# Patient Record
Sex: MALE | Race: WHITE | Employment: OTHER | URBAN - METROPOLITAN AREA
[De-identification: names, ages, dates, MRNs, and addresses within clinical notes are randomized per-mention and may not be internally consistent; named-entity substitution may affect disease eponyms.]

---

## 2017-08-06 RX ORDER — PANTOPRAZOLE SODIUM 20 MG/1
20 TABLET, DELAYED RELEASE ORAL DAILY PRN
Qty: 90 TABLET | Refills: 3 | Status: SHIPPED | OUTPATIENT
Start: 2017-08-06 | End: 2018-09-10 | Stop reason: SDUPTHER

## 2018-04-23 ENCOUNTER — OFFICE VISIT (OUTPATIENT)
Dept: FAMILY MEDICINE CLINIC | Age: 63
End: 2018-04-23
Payer: MEDICARE

## 2018-04-23 VITALS
BODY MASS INDEX: 31.81 KG/M2 | WEIGHT: 255.8 LBS | OXYGEN SATURATION: 95 % | HEART RATE: 88 BPM | TEMPERATURE: 98.5 F | HEIGHT: 75 IN | SYSTOLIC BLOOD PRESSURE: 140 MMHG | DIASTOLIC BLOOD PRESSURE: 92 MMHG

## 2018-04-23 DIAGNOSIS — Z00.00 ROUTINE GENERAL MEDICAL EXAMINATION AT A HEALTH CARE FACILITY: Primary | ICD-10-CM

## 2018-04-23 DIAGNOSIS — Z00.00 ROUTINE GENERAL MEDICAL EXAMINATION AT A HEALTH CARE FACILITY: ICD-10-CM

## 2018-04-23 DIAGNOSIS — Z12.5 SCREENING PSA (PROSTATE SPECIFIC ANTIGEN): ICD-10-CM

## 2018-04-23 DIAGNOSIS — N39.3 STRESS INCONTINENCE OF URINE: ICD-10-CM

## 2018-04-23 DIAGNOSIS — N40.1 BENIGN PROSTATIC HYPERPLASIA WITH LOWER URINARY TRACT SYMPTOMS, SYMPTOM DETAILS UNSPECIFIED: ICD-10-CM

## 2018-04-23 DIAGNOSIS — Z13.220 NEED FOR LIPID SCREENING: ICD-10-CM

## 2018-04-23 DIAGNOSIS — Z23 NEED FOR PROPHYLACTIC VACCINATION AGAINST STREPTOCOCCUS PNEUMONIAE (PNEUMOCOCCUS): ICD-10-CM

## 2018-04-23 LAB
ALBUMIN SERPL-MCNC: 4.2 G/DL (ref 3.9–4.9)
ALP BLD-CCNC: 66 U/L (ref 35–104)
ALT SERPL-CCNC: 15 U/L (ref 0–41)
ANION GAP SERPL CALCULATED.3IONS-SCNC: 13 MEQ/L (ref 7–13)
AST SERPL-CCNC: 15 U/L (ref 0–40)
BACTERIA: ABNORMAL /HPF
BASOPHILS ABSOLUTE: 0.1 K/UL (ref 0–0.2)
BASOPHILS RELATIVE PERCENT: 0.7 %
BILIRUB SERPL-MCNC: 0.7 MG/DL (ref 0–1.2)
BILIRUBIN URINE: NEGATIVE
BLOOD, URINE: NEGATIVE
BUN BLDV-MCNC: 21 MG/DL (ref 8–23)
CALCIUM SERPL-MCNC: 9 MG/DL (ref 8.6–10.2)
CHLORIDE BLD-SCNC: 107 MEQ/L (ref 98–107)
CHOLESTEROL, TOTAL: 206 MG/DL (ref 0–199)
CLARITY: CLEAR
CO2: 27 MEQ/L (ref 22–29)
COLOR: YELLOW
CREAT SERPL-MCNC: 1.06 MG/DL (ref 0.7–1.2)
EOSINOPHILS ABSOLUTE: 0.1 K/UL (ref 0–0.7)
EOSINOPHILS RELATIVE PERCENT: 1.7 %
GFR AFRICAN AMERICAN: >60
GFR NON-AFRICAN AMERICAN: >60
GLOBULIN: 2.2 G/DL (ref 2.3–3.5)
GLUCOSE BLD-MCNC: 99 MG/DL (ref 74–109)
GLUCOSE URINE: NEGATIVE MG/DL
HCT VFR BLD CALC: 49.6 % (ref 42–52)
HDLC SERPL-MCNC: 45 MG/DL (ref 40–59)
HEMOGLOBIN: 17.2 G/DL (ref 14–18)
KETONES, URINE: NEGATIVE MG/DL
LDL CHOLESTEROL CALCULATED: 128 MG/DL (ref 0–129)
LEUKOCYTE ESTERASE, URINE: ABNORMAL
LYMPHOCYTES ABSOLUTE: 1.5 K/UL (ref 1–4.8)
LYMPHOCYTES RELATIVE PERCENT: 19.7 %
MCH RBC QN AUTO: 32.4 PG (ref 27–31.3)
MCHC RBC AUTO-ENTMCNC: 34.7 % (ref 33–37)
MCV RBC AUTO: 93.5 FL (ref 80–100)
MONOCYTES ABSOLUTE: 0.6 K/UL (ref 0.2–0.8)
MONOCYTES RELATIVE PERCENT: 7.4 %
MUCUS: PRESENT
NEUTROPHILS ABSOLUTE: 5.5 K/UL (ref 1.4–6.5)
NEUTROPHILS RELATIVE PERCENT: 70.5 %
NITRITE, URINE: NEGATIVE
PDW BLD-RTO: 12.9 % (ref 11.5–14.5)
PH UA: 5 (ref 5–9)
PLATELET # BLD: 188 K/UL (ref 130–400)
POTASSIUM SERPL-SCNC: 5.3 MEQ/L (ref 3.5–5.1)
PROSTATE SPECIFIC ANTIGEN: 4.13 NG/ML (ref 0–5.4)
PROTEIN UA: NEGATIVE MG/DL
RBC # BLD: 5.3 M/UL (ref 4.7–6.1)
RBC UA: ABNORMAL /HPF (ref 0–2)
SODIUM BLD-SCNC: 147 MEQ/L (ref 132–144)
SPECIFIC GRAVITY UA: 1.03 (ref 1–1.03)
TOTAL PROTEIN: 6.4 G/DL (ref 6.4–8.1)
TRIGL SERPL-MCNC: 166 MG/DL (ref 0–200)
UROBILINOGEN, URINE: 0.2 E.U./DL
WBC # BLD: 7.8 K/UL (ref 4.8–10.8)
WBC UA: ABNORMAL /HPF (ref 0–5)

## 2018-04-23 PROCEDURE — 90670 PCV13 VACCINE IM: CPT | Performed by: FAMILY MEDICINE

## 2018-04-23 PROCEDURE — G0438 PPPS, INITIAL VISIT: HCPCS | Performed by: FAMILY MEDICINE

## 2018-04-23 PROCEDURE — G0009 ADMIN PNEUMOCOCCAL VACCINE: HCPCS | Performed by: FAMILY MEDICINE

## 2018-04-23 PROCEDURE — 4040F PNEUMOC VAC/ADMIN/RCVD: CPT | Performed by: FAMILY MEDICINE

## 2018-04-23 ASSESSMENT — LIFESTYLE VARIABLES
HOW OFTEN DURING THE LAST YEAR HAVE YOU FOUND THAT YOU WERE NOT ABLE TO STOP DRINKING ONCE YOU HAD STARTED: 0
HOW OFTEN DURING THE LAST YEAR HAVE YOU NEEDED AN ALCOHOLIC DRINK FIRST THING IN THE MORNING TO GET YOURSELF GOING AFTER A NIGHT OF HEAVY DRINKING: 0
AUDIT-C TOTAL SCORE: 1
HOW OFTEN DURING THE LAST YEAR HAVE YOU BEEN UNABLE TO REMEMBER WHAT HAPPENED THE NIGHT BEFORE BECAUSE YOU HAD BEEN DRINKING: 0
HOW MANY STANDARD DRINKS CONTAINING ALCOHOL DO YOU HAVE ON A TYPICAL DAY: 0
HAVE YOU OR SOMEONE ELSE BEEN INJURED AS A RESULT OF YOUR DRINKING: 0
HAS A RELATIVE, FRIEND, DOCTOR, OR ANOTHER HEALTH PROFESSIONAL EXPRESSED CONCERN ABOUT YOUR DRINKING OR SUGGESTED YOU CUT DOWN: 0
HOW OFTEN DURING THE LAST YEAR HAVE YOU FAILED TO DO WHAT WAS NORMALLY EXPECTED FROM YOU BECAUSE OF DRINKING: 0
HOW OFTEN DO YOU HAVE A DRINK CONTAINING ALCOHOL: 1
HOW OFTEN DURING THE LAST YEAR HAVE YOU HAD A FEELING OF GUILT OR REMORSE AFTER DRINKING: 0
HOW OFTEN DO YOU HAVE SIX OR MORE DRINKS ON ONE OCCASION: 0
AUDIT TOTAL SCORE: 1

## 2018-04-23 ASSESSMENT — ANXIETY QUESTIONNAIRES: GAD7 TOTAL SCORE: 0

## 2018-04-23 ASSESSMENT — PATIENT HEALTH QUESTIONNAIRE - PHQ9: SUM OF ALL RESPONSES TO PHQ QUESTIONS 1-9: 0

## 2018-04-25 LAB — URINE CULTURE, ROUTINE: NORMAL

## 2018-04-29 DIAGNOSIS — R82.81 PYURIA: ICD-10-CM

## 2018-04-29 DIAGNOSIS — E87.5 HYPERKALEMIA: Primary | ICD-10-CM

## 2018-04-29 RX ORDER — LEVOFLOXACIN 750 MG/1
750 TABLET ORAL DAILY
Qty: 10 TABLET | Refills: 0 | Status: SHIPPED | OUTPATIENT
Start: 2018-04-29 | End: 2018-05-09

## 2018-06-25 DIAGNOSIS — E87.5 HYPERKALEMIA: ICD-10-CM

## 2018-06-25 LAB — POTASSIUM SERPL-SCNC: 4.1 MEQ/L (ref 3.5–5.1)

## 2018-07-25 ENCOUNTER — OFFICE VISIT (OUTPATIENT)
Dept: FAMILY MEDICINE CLINIC | Age: 63
End: 2018-07-25
Payer: MEDICARE

## 2018-07-25 VITALS
BODY MASS INDEX: 31.95 KG/M2 | SYSTOLIC BLOOD PRESSURE: 150 MMHG | OXYGEN SATURATION: 99 % | HEIGHT: 75 IN | HEART RATE: 74 BPM | DIASTOLIC BLOOD PRESSURE: 90 MMHG | TEMPERATURE: 97.5 F | WEIGHT: 257 LBS

## 2018-07-25 DIAGNOSIS — L98.0 PYOGENIC GRANULOMA OF SKIN: Primary | ICD-10-CM

## 2018-07-25 PROCEDURE — 1123F ACP DISCUSS/DSCN MKR DOCD: CPT | Performed by: FAMILY MEDICINE

## 2018-07-25 PROCEDURE — G8417 CALC BMI ABV UP PARAM F/U: HCPCS | Performed by: FAMILY MEDICINE

## 2018-07-25 PROCEDURE — 4040F PNEUMOC VAC/ADMIN/RCVD: CPT | Performed by: FAMILY MEDICINE

## 2018-07-25 PROCEDURE — G8427 DOCREV CUR MEDS BY ELIG CLIN: HCPCS | Performed by: FAMILY MEDICINE

## 2018-07-25 PROCEDURE — 99213 OFFICE O/P EST LOW 20 MIN: CPT | Performed by: FAMILY MEDICINE

## 2018-07-25 PROCEDURE — 1101F PT FALLS ASSESS-DOCD LE1/YR: CPT | Performed by: FAMILY MEDICINE

## 2018-07-25 PROCEDURE — 3017F COLORECTAL CA SCREEN DOC REV: CPT | Performed by: FAMILY MEDICINE

## 2018-07-25 PROCEDURE — 1036F TOBACCO NON-USER: CPT | Performed by: FAMILY MEDICINE

## 2018-09-10 RX ORDER — PANTOPRAZOLE SODIUM 20 MG/1
20 TABLET, DELAYED RELEASE ORAL DAILY PRN
Qty: 90 TABLET | Refills: 3 | Status: SHIPPED | OUTPATIENT
Start: 2018-09-10

## 2019-02-14 ENCOUNTER — OFFICE VISIT (OUTPATIENT)
Dept: FAMILY MEDICINE CLINIC | Age: 64
End: 2019-02-14
Payer: MEDICARE

## 2019-02-14 VITALS
BODY MASS INDEX: 32.73 KG/M2 | HEART RATE: 92 BPM | SYSTOLIC BLOOD PRESSURE: 136 MMHG | RESPIRATION RATE: 16 BRPM | WEIGHT: 255 LBS | DIASTOLIC BLOOD PRESSURE: 86 MMHG | HEIGHT: 74 IN | OXYGEN SATURATION: 92 % | TEMPERATURE: 97.7 F

## 2019-02-14 DIAGNOSIS — J06.9 URTI (ACUTE UPPER RESPIRATORY INFECTION): Primary | ICD-10-CM

## 2019-02-14 PROCEDURE — G8427 DOCREV CUR MEDS BY ELIG CLIN: HCPCS | Performed by: INTERNAL MEDICINE

## 2019-02-14 PROCEDURE — 1101F PT FALLS ASSESS-DOCD LE1/YR: CPT | Performed by: INTERNAL MEDICINE

## 2019-02-14 PROCEDURE — 99213 OFFICE O/P EST LOW 20 MIN: CPT | Performed by: INTERNAL MEDICINE

## 2019-02-14 PROCEDURE — 3017F COLORECTAL CA SCREEN DOC REV: CPT | Performed by: INTERNAL MEDICINE

## 2019-02-14 PROCEDURE — 1123F ACP DISCUSS/DSCN MKR DOCD: CPT | Performed by: INTERNAL MEDICINE

## 2019-02-14 PROCEDURE — G8417 CALC BMI ABV UP PARAM F/U: HCPCS | Performed by: INTERNAL MEDICINE

## 2019-02-14 PROCEDURE — G8484 FLU IMMUNIZE NO ADMIN: HCPCS | Performed by: INTERNAL MEDICINE

## 2019-02-14 PROCEDURE — 4040F PNEUMOC VAC/ADMIN/RCVD: CPT | Performed by: INTERNAL MEDICINE

## 2019-02-14 PROCEDURE — G8510 SCR DEP NEG, NO PLAN REQD: HCPCS | Performed by: INTERNAL MEDICINE

## 2019-02-14 PROCEDURE — 1036F TOBACCO NON-USER: CPT | Performed by: INTERNAL MEDICINE

## 2019-02-14 RX ORDER — BENZONATATE 100 MG/1
100-200 CAPSULE ORAL 3 TIMES DAILY PRN
Qty: 20 CAPSULE | Refills: 0 | Status: SHIPPED | OUTPATIENT
Start: 2019-02-14 | End: 2019-02-21

## 2019-02-14 ASSESSMENT — PATIENT HEALTH QUESTIONNAIRE - PHQ9
SUM OF ALL RESPONSES TO PHQ QUESTIONS 1-9: 0
2. FEELING DOWN, DEPRESSED OR HOPELESS: 0
SUM OF ALL RESPONSES TO PHQ9 QUESTIONS 1 & 2: 0
SUM OF ALL RESPONSES TO PHQ QUESTIONS 1-9: 0
1. LITTLE INTEREST OR PLEASURE IN DOING THINGS: 0

## 2019-02-14 ASSESSMENT — ENCOUNTER SYMPTOMS
DIARRHEA: 0
COUGH: 1
SINUS PAIN: 0
WHEEZING: 0
ABDOMINAL PAIN: 0
SINUS PRESSURE: 0
VOMITING: 0
NAUSEA: 0
SORE THROAT: 0
RHINORRHEA: 0
SHORTNESS OF BREATH: 0

## 2019-02-21 ENCOUNTER — TELEPHONE (OUTPATIENT)
Dept: ADMINISTRATIVE | Age: 64
End: 2019-02-21

## 2019-02-21 ENCOUNTER — TELEPHONE (OUTPATIENT)
Dept: FAMILY MEDICINE CLINIC | Age: 64
End: 2019-02-21

## 2019-05-16 ENCOUNTER — TELEPHONE (OUTPATIENT)
Dept: FAMILY MEDICINE CLINIC | Age: 64
End: 2019-05-16

## 2023-04-12 PROBLEM — L84 PLANTAR CALLUS: Status: ACTIVE | Noted: 2023-04-12

## 2023-04-12 PROBLEM — K21.9 GASTROESOPHAGEAL REFLUX DISEASE WITHOUT ESOPHAGITIS: Status: ACTIVE | Noted: 2023-04-12

## 2023-04-12 PROBLEM — L82.1 SK (SEBORRHEIC KERATOSIS): Status: ACTIVE | Noted: 2023-04-12

## 2023-04-12 PROBLEM — I44.7 LEFT BUNDLE BRANCH BLOCK (LBBB): Status: ACTIVE | Noted: 2023-04-12

## 2023-04-12 PROBLEM — K63.5 POLYP OF COLON: Status: ACTIVE | Noted: 2023-04-12

## 2023-04-12 PROBLEM — N13.8 BPH WITH OBSTRUCTION/LOWER URINARY TRACT SYMPTOMS: Status: ACTIVE | Noted: 2023-04-12

## 2023-04-12 PROBLEM — R97.20 ELEVATED PSA: Status: ACTIVE | Noted: 2023-04-12

## 2023-04-12 PROBLEM — R33.9 URINARY RETENTION WITH INCOMPLETE BLADDER EMPTYING: Status: ACTIVE | Noted: 2023-04-12

## 2023-04-12 PROBLEM — R03.0 PREHYPERTENSION: Status: ACTIVE | Noted: 2023-04-12

## 2023-04-12 PROBLEM — R31.9 HEMATURIA: Status: ACTIVE | Noted: 2023-04-12

## 2023-04-12 PROBLEM — M79.7 FIBROMYALGIA: Status: ACTIVE | Noted: 2023-04-12

## 2023-04-12 PROBLEM — R35.1 NOCTURIA: Status: ACTIVE | Noted: 2023-04-12

## 2023-04-12 PROBLEM — N40.1 BPH WITH OBSTRUCTION/LOWER URINARY TRACT SYMPTOMS: Status: ACTIVE | Noted: 2023-04-12

## 2023-04-12 NOTE — PROGRESS NOTES
"Subjective   Patient ID: Alex Alba is a 68 y.o. male who presents for Skin Tag and Leg Pain. I last saw the patient on 01/16/2023.     HPI   Patient states the skin tag has been there or months. He does not like how it looks and he can see it in his line of sight. He denies any growth or pain.     Left leg is not hurting anymore he states he thinks it was from working in garden.     He feels that Jardiance is helping him. He will noticed a drop in his BS at the same time every day, but he can eat an orange and it immediately goes back up.     He mentions that his BP is usually in the 110/60 range.     Review of Systems  Except positives as noted in the CC & HPI      Constitutional: Denies fevers, chills, night sweats, fatigue, weight changes, change in appetite    Eyes: Denies blurry vision, double vision    ENT: Denies otalgia, trouble hearing, tinnitus, vertigo, nasal congestion, rhinorrhea, sore throat    Neck: Denies swelling, masses    Cardiovascular: Denies chest pain, palpitations, edema, orthopnea, syncope    Respiratory: Denies dyspnea, cough, wheezing, postural nocturnal dyspnea    Gastrointestinal: Denies abdominal pain, nausea, vomiting, diarrhea, constipation, melena, hematochezia    Genitourinary: Denies dysuria, hematuria, frequency, urgency    Musculoskeletal: Denies back pain, neck pain, arthralgias, myalgias    Integumentary: Denies skin rashes, masses    Neurological: Denies dizziness, headaches, confusion, limb weakness, paresthesias, syncope, convulsions    Psychiatric: Denies depression, anxiety, homicidal ideations, suicidal ideations, sleep disturbances    Endocrine: Denies polyphagia, polydipsia, polyuria, weakness, hair thinning, heat intolerance, cold intolerance, weight changes    Heme/Lymph: Denies easy bruising, easy bleeding, swollen glands     Objective   /69   Pulse 64   Temp 36.7 °C (98.1 °F) (Temporal)   Resp 16   Ht 1.905 m (6' 3\")   Wt 113 kg (248 lb 6.4 oz)  "  SpO2 96%   BMI 31.05 kg/m²     Physical Exam  General Appearance - well-developed, well-nourished, 68 y.o., White male in no acute distress.     Skin - warm, pink and dry without rash or concerning lesions. 5 mm raised, brown, somewhat scaly skin lesion of the right malar area. 3 mm raised lesion of the right nasal bridge with a warty appearance.     Mental Status - alert and oriented x 3. Normal mood and affect appropriate to mood.     Neck - supple without lymphadenopathy. Carotid pulses are normal without bruits. Thyroid is normal in midline without nodules.     Chest - lungs are clear to auscultation without rales, rhonchi or wheezes.     Heart - regular, rate and rhythm without murmurs, rubs or gallops.    Extremities - no cyanosis, clubbing or edema. Pedal pulses are 2+ normal at the dorsalis pedis and posterior pulses bilaterally.     Assessment/Plan   1. Verrucous keratosis        2. Prehypertension        3. Left bundle branch block (LBBB)        4. SK (seborrheic keratosis)        Patient to continue current medications (with any exceptions as noted) and diet. Follow-up on 6/1/2023, otherwise as needed.      Patient may schedule skin lesion cryo excisions at their convenience.       Scribe Attestation  By signing my name below, IMarianne Scribe   attest that this documentation has been prepared under the direction and in the presence of Rodney Flores MD.

## 2023-04-13 ENCOUNTER — OFFICE VISIT (OUTPATIENT)
Dept: PRIMARY CARE | Facility: CLINIC | Age: 68
End: 2023-04-13
Payer: MEDICARE

## 2023-04-13 VITALS
HEIGHT: 75 IN | DIASTOLIC BLOOD PRESSURE: 69 MMHG | WEIGHT: 248.4 LBS | BODY MASS INDEX: 30.88 KG/M2 | OXYGEN SATURATION: 96 % | HEART RATE: 64 BPM | TEMPERATURE: 98.1 F | SYSTOLIC BLOOD PRESSURE: 115 MMHG | RESPIRATION RATE: 16 BRPM

## 2023-04-13 DIAGNOSIS — I44.7 LEFT BUNDLE BRANCH BLOCK (LBBB): ICD-10-CM

## 2023-04-13 DIAGNOSIS — R03.0 PREHYPERTENSION: ICD-10-CM

## 2023-04-13 DIAGNOSIS — L82.1 VERRUCOUS KERATOSIS: Primary | ICD-10-CM

## 2023-04-13 DIAGNOSIS — L82.1 SK (SEBORRHEIC KERATOSIS): ICD-10-CM

## 2023-04-13 PROCEDURE — 99213 OFFICE O/P EST LOW 20 MIN: CPT | Performed by: FAMILY MEDICINE

## 2023-04-13 PROCEDURE — 1159F MED LIST DOCD IN RCRD: CPT | Performed by: FAMILY MEDICINE

## 2023-04-13 PROCEDURE — 1036F TOBACCO NON-USER: CPT | Performed by: FAMILY MEDICINE

## 2023-04-13 RX ORDER — PANTOPRAZOLE SODIUM 20 MG/1
TABLET, DELAYED RELEASE ORAL
COMMUNITY
End: 2023-10-04 | Stop reason: ALTCHOICE

## 2023-04-13 RX ORDER — METOPROLOL SUCCINATE 25 MG/1
TABLET, EXTENDED RELEASE ORAL
COMMUNITY
Start: 2023-01-31 | End: 2024-01-19 | Stop reason: SDUPTHER

## 2023-04-13 RX ORDER — EMPAGLIFLOZIN 10 MG/1
10 TABLET, FILM COATED ORAL DAILY
COMMUNITY
Start: 2023-03-26 | End: 2024-01-19 | Stop reason: SDUPTHER

## 2023-04-13 RX ORDER — TAMSULOSIN HYDROCHLORIDE 0.4 MG/1
CAPSULE ORAL
COMMUNITY

## 2023-04-13 RX ORDER — SACUBITRIL AND VALSARTAN 24; 26 MG/1; MG/1
TABLET, FILM COATED ORAL
COMMUNITY
Start: 2023-02-02 | End: 2024-01-19 | Stop reason: SDUPTHER

## 2023-04-13 ASSESSMENT — ENCOUNTER SYMPTOMS
DEPRESSION: 0
LOSS OF SENSATION IN FEET: 0
OCCASIONAL FEELINGS OF UNSTEADINESS: 0

## 2023-04-13 ASSESSMENT — PATIENT HEALTH QUESTIONNAIRE - PHQ9
SUM OF ALL RESPONSES TO PHQ9 QUESTIONS 1 & 2: 0
1. LITTLE INTEREST OR PLEASURE IN DOING THINGS: NOT AT ALL
2. FEELING DOWN, DEPRESSED OR HOPELESS: NOT AT ALL

## 2023-04-13 ASSESSMENT — LIFESTYLE VARIABLES
HOW MANY STANDARD DRINKS CONTAINING ALCOHOL DO YOU HAVE ON A TYPICAL DAY: PATIENT DOES NOT DRINK
HOW OFTEN DO YOU HAVE A DRINK CONTAINING ALCOHOL: NEVER
SKIP TO QUESTIONS 9-10: 1
HOW OFTEN DO YOU HAVE SIX OR MORE DRINKS ON ONE OCCASION: NEVER
AUDIT-C TOTAL SCORE: 0

## 2023-04-13 ASSESSMENT — PAIN SCALES - GENERAL: PAINLEVEL: 0-NO PAIN

## 2023-04-13 NOTE — PATIENT INSTRUCTIONS
Patient to continue current medications (with any exceptions as noted) and diet. Follow-up on 6/1/2023, otherwise as needed.      Patient may schedule skin lesion cryo excisions at their convenience.

## 2023-05-09 RX ORDER — COVID-19 MOLECULAR TEST ASSAY
KIT MISCELLANEOUS
COMMUNITY
Start: 2023-01-10 | End: 2023-10-04 | Stop reason: ALTCHOICE

## 2023-05-09 RX ORDER — OXYBUTYNIN CHLORIDE 5 MG/1
TABLET ORAL 2 TIMES DAILY
COMMUNITY
Start: 2022-06-06 | End: 2023-10-04 | Stop reason: ALTCHOICE

## 2023-05-09 NOTE — PROGRESS NOTES
"Subjective   Patient ID: Alex Alba is a 68 y.o. male who presents for Procedure. I last saw the patient on 4/13/23.     HPI     Review of Systems  Except positives as noted in the CC & HPI      Constitutional: Denies fevers, chills, night sweats, fatigue, weight changes, change in appetite    Eyes: Denies blurry vision, double vision    ENT: Denies otalgia, trouble hearing, tinnitus, vertigo, nasal congestion, rhinorrhea, sore throat    Neck: Denies swelling, masses    Cardiovascular: Denies chest pain, palpitations, edema, orthopnea, syncope    Respiratory: Denies dyspnea, cough, wheezing, postural nocturnal dyspnea    Gastrointestinal: Denies abdominal pain, nausea, vomiting, diarrhea, constipation, melena, hematochezia    Genitourinary: Denies dysuria, hematuria, frequency, urgency    Musculoskeletal: Denies back pain, neck pain, arthralgias, myalgias    Integumentary: Denies skin lesions, rashes  Neurological: Denies dizziness, headaches, confusion, limb weakness, paresthesias, syncope, convulsions    Psychiatric: Denies depression, anxiety, homicidal ideations, suicidal ideations, sleep disturbances    Endocrine: Denies polyphagia, polydipsia, polyuria, weakness, hair thinning, heat intolerance, cold intolerance, weight changes    Heme/Lymph: Denies easy bruising, easy bleeding, swollen glands     Objective   /58 (BP Location: Right arm, Patient Position: Sitting)   Pulse 60   Temp 36.4 °C (97.5 °F) (Temporal)   Resp 16   Ht 1.905 m (6' 3\")   Wt 113 kg (249 lb)   SpO2 95%   BMI 31.12 kg/m²     Physical Exam  118/58 on recheck of BP in the right arm.     General Appearance - well-developed, well-nourished, 68 y.o., White male in no acute distress.     Skin - warm, pink and dry without rash or concerning lesions. 5 mm raised, brown, somewhat scaly skin lesion of the right malar area. 3 mm raised lesion of the right nasal bridge with a warty appearance.     Mental Status - alert and oriented " x 3. Normal mood and affect appropriate to mood.     Procedure:  Discussed risks and complications of the procedure with the patient and the consent form was signed.   Skin was prepped with alcohol swab. 0.25 cc  2% lidocaine with epinephrine injected intradermally at site of both lesions. #15 blade scalpel was used to shave 3 mm nasal bridge lesion tangentially. Hemostasis achieved with silver nitrate stick. A small skin tag was removed from the malar lesion with straight scissors. The biopsy specimens were sent to the laboratory for pathological evaluation. Patient tolerated procedure well and was instructed to cleanse the wound twice daily with soap and water, apply Bacitracin ointment for 3-7 days. Patient was instructed to notify the office if the wound site drains purulent material, becomes painful, red, hard, or swollen.    Location 1: Right nasal bridge    Location 2: Right malar area    Procedure:   Discussed risks and complications of the procedure with the patient and the consent form was signed. The skin was prepped with K-Y jelly and then cryosurgery was used to treat the lesion located on the right malar area, using the cryo-gun with the medium tip. The lesion was treated with 3 cycles of freezing and thawing. Patient tolerated the procedure well without complications.     Assessment/Plan   1. Verrucous keratosis  Surgical Pathology Exam      2. SK (seborrheic keratosis)  Surgical Pathology Exam      Patient to continue current medications (with any exceptions as noted) and diet. Follow-up on 6/1/2023 for AWV and to recheck wounds, otherwise as needed.      Patient was instructed to wash the affected area with antibacterial soap and water twice daily and apply bacitracin ointment afterwards.     Discussed wound care instructions with patient as well as signs and symptoms of infection. Patient is to call with any problems.     Patient was advised that the lesion will turn black and scab over and then  fall off in 10-14 days. Discussed wound care with patient as well as signs and symptoms of infection. The patient will call with any problems. Follow-up as needed.         Scribe Attestation  By signing my name below, IMarianne Scribe   attest that this documentation has been prepared under the direction and in the presence of Rodney Flores MD.

## 2023-05-10 ENCOUNTER — PROCEDURE VISIT (OUTPATIENT)
Dept: PRIMARY CARE | Facility: CLINIC | Age: 68
End: 2023-05-10
Payer: MEDICARE

## 2023-05-10 VITALS
DIASTOLIC BLOOD PRESSURE: 58 MMHG | SYSTOLIC BLOOD PRESSURE: 118 MMHG | WEIGHT: 249 LBS | RESPIRATION RATE: 16 BRPM | HEIGHT: 75 IN | OXYGEN SATURATION: 95 % | TEMPERATURE: 97.5 F | BODY MASS INDEX: 30.96 KG/M2 | HEART RATE: 60 BPM

## 2023-05-10 DIAGNOSIS — L82.1 VERRUCOUS KERATOSIS: Primary | ICD-10-CM

## 2023-05-10 DIAGNOSIS — L82.1 SK (SEBORRHEIC KERATOSIS): ICD-10-CM

## 2023-05-10 PROCEDURE — 11310 SHAVE SKIN LESION 0.5 CM/<: CPT | Performed by: FAMILY MEDICINE

## 2023-05-10 PROCEDURE — 17000 DESTRUCT PREMALG LESION: CPT | Performed by: FAMILY MEDICINE

## 2023-05-10 ASSESSMENT — LIFESTYLE VARIABLES
HOW MANY STANDARD DRINKS CONTAINING ALCOHOL DO YOU HAVE ON A TYPICAL DAY: PATIENT DOES NOT DRINK
AUDIT-C TOTAL SCORE: 0
HOW OFTEN DO YOU HAVE A DRINK CONTAINING ALCOHOL: NEVER
HOW OFTEN DO YOU HAVE SIX OR MORE DRINKS ON ONE OCCASION: NEVER
SKIP TO QUESTIONS 9-10: 1

## 2023-05-10 ASSESSMENT — PATIENT HEALTH QUESTIONNAIRE - PHQ9
1. LITTLE INTEREST OR PLEASURE IN DOING THINGS: NOT AT ALL
2. FEELING DOWN, DEPRESSED OR HOPELESS: NOT AT ALL
SUM OF ALL RESPONSES TO PHQ9 QUESTIONS 1 & 2: 0

## 2023-05-10 ASSESSMENT — ENCOUNTER SYMPTOMS
LOSS OF SENSATION IN FEET: 0
OCCASIONAL FEELINGS OF UNSTEADINESS: 0
DEPRESSION: 0

## 2023-05-10 ASSESSMENT — PAIN SCALES - GENERAL: PAINLEVEL: 0-NO PAIN

## 2023-05-10 NOTE — PATIENT INSTRUCTIONS
Patient was instructed to wash the affected area with antibacterial soap and water twice daily and apply bacitracin ointment afterwards.     Discussed wound care instructions with patient as well as signs and symptoms of infection. Patient is to call with any problems.     Patient was advised that the lesion will turn black and scab over and then fall off in 10-14 days. Discussed wound care with patient as well as signs and symptoms of infection. The patient will call with any problems. Follow-up as needed.

## 2023-05-11 PROCEDURE — 0753T DGTZ GLS MCRSCP SLD LEVEL IV: CPT

## 2023-05-11 PROCEDURE — 88305 TISSUE EXAM BY PATHOLOGIST: CPT | Performed by: PATHOLOGY

## 2023-05-11 PROCEDURE — 88305 TISSUE EXAM BY PATHOLOGIST: CPT

## 2023-05-24 LAB
COMPLETE PATHOLOGY REPORT: NORMAL
CONVERTED CLINICAL DIAGNOSIS-HISTORY: NORMAL
CONVERTED FINAL DIAGNOSIS: NORMAL
CONVERTED FINAL REPORT PDF LINK TO COPY AND PASTE: NORMAL
CONVERTED GROSS DESCRIPTION: NORMAL

## 2023-05-31 NOTE — PROGRESS NOTES
"Subjective   Patient ID: Alex Alba is a 68 y.o. male who presents for Benign Prostatic Hypertrophy, Fibromyalgia, and Follow-up. I last saw the patient on 5/10/2023.     HPI   Patient has no medical complaints today.     He notes that he will walk on the treadmill to get some exercise into his routine.     He mentions that his BP at home was 116/68. He adds that it does seem to slowly be moving back up when he takes it at home.     Review of Systems  Except positives as noted in the CC & HPI      Constitutional: Denies fevers, chills, night sweats, fatigue, weight changes, change in appetite    Eyes: Denies blurry vision, double vision    ENT: Denies otalgia, trouble hearing, tinnitus, vertigo, nasal congestion, rhinorrhea, sore throat    Neck: Denies swelling, masses    Cardiovascular: Denies chest pain, palpitations, edema, orthopnea, syncope    Respiratory: Denies dyspnea, cough, wheezing, postural nocturnal dyspnea    Gastrointestinal: Denies abdominal pain, nausea, vomiting, diarrhea, constipation, melena, hematochezia    Genitourinary: Denies dysuria, hematuria, frequency, urgency    Musculoskeletal: Denies back pain, neck pain, arthralgias, myalgias    Integumentary: Denies skin lesions, rashes, masses    Neurological: Denies dizziness, headaches, confusion, limb weakness, paresthesias, syncope, convulsions    Psychiatric: Denies depression, anxiety, homicidal ideations, suicidal ideations, sleep disturbances    Endocrine: Denies polyphagia, polydipsia, polyuria, weakness, hair thinning, heat intolerance, cold intolerance, weight changes    Heme/Lymph: Denies easy bruising, easy bleeding, swollen glands     Objective   /76 (BP Location: Right arm, Patient Position: Sitting)   Pulse 78   Temp 36.1 °C (97 °F)   Resp 16   Ht 1.905 m (6' 3\")   Wt 111 kg (244 lb)   SpO2 96%   BMI 30.50 kg/m²     Physical Exam  130/76 on recheck of BP in the right arm.     General Appearance - well-developed, " well-nourished, 68 y.o., White male in no acute distress.     Skin - warm, pink and dry without rash or concerning lesions. Resolution of the skin lesions involving the right nasal bridge and malar area.     Mental Status - alert and oriented x 3. Normal mood and affect appropriate to mood.     Neck - supple without lymphadenopathy. Carotid pulses are normal without bruits. Thyroid is normal in midline without nodules.     Chest - lungs are clear to auscultation without rales, rhonchi or wheezes.     Heart - regular, rate and rhythm without murmurs, rubs or gallops.    Abdomen - soft, obese, protuberant, nontender, nondistended. No masses, hepatomegaly or splenomegaly is noted. No rebound, rigidity or guarding is noted. Bowel sounds are normoactive.     Extremities - no cyanosis, clubbing or edema. Pedal pulses are 2+ normal at the dorsalis pedis and posterior pulses bilaterally.    Neurological - cranial nerves II through XII are grossly intact. Motor strength 5/5 at all fours.     Assessment/Plan   1. Prehypertension  CBC and Auto Differential    Comprehensive Metabolic Panel    Follow Up In [facility] - PCP      2. Gastroesophageal reflux disease without esophagitis  Follow Up [facility] - PCP      3. Cardiomyopathy, unspecified type (CMS/HCC)        4. Left bundle branch block (LBBB)        5. BPH with obstruction/lower urinary tract symptoms  Follow Up In [facility] - PCP      6. Fibromyalgia  Follow Up In [facility] - PCP      7. Class 1 obesity due to excess calories without serious comorbidity with body mass index (BMI) of 30.0 to 30.9 in adult        8. Lipid screening  Lipid Panel      9. Family history of diabetes mellitus (DM)  Hemoglobin A1C      Patient to continue current medications (with any exceptions as noted) and diet. Follow-up in 6 month(s) otherwise as needed.      Patient is to complete fasting CBC, CMP, lipid panel, A1c labs today. Will call  patient with results when available.     Patient is to follow up with Dr. Gonzalez (cardio), Dr. Hagan (urology) as scheduled.         Scribe Attestation  By signing my name below, I, López Ward   attest that this documentation has been prepared under the direction and in the presence of Rodney Flores MD.

## 2023-06-01 ENCOUNTER — LAB (OUTPATIENT)
Dept: LAB | Facility: LAB | Age: 68
End: 2023-06-01
Payer: MEDICARE

## 2023-06-01 ENCOUNTER — OFFICE VISIT (OUTPATIENT)
Dept: PRIMARY CARE | Facility: CLINIC | Age: 68
End: 2023-06-01
Payer: MEDICARE

## 2023-06-01 VITALS
HEIGHT: 75 IN | RESPIRATION RATE: 16 BRPM | TEMPERATURE: 97 F | OXYGEN SATURATION: 96 % | DIASTOLIC BLOOD PRESSURE: 76 MMHG | SYSTOLIC BLOOD PRESSURE: 130 MMHG | HEART RATE: 78 BPM | WEIGHT: 244 LBS | BODY MASS INDEX: 30.34 KG/M2

## 2023-06-01 DIAGNOSIS — Z13.220 LIPID SCREENING: ICD-10-CM

## 2023-06-01 DIAGNOSIS — Z83.3 FAMILY HISTORY OF DIABETES MELLITUS (DM): ICD-10-CM

## 2023-06-01 DIAGNOSIS — R03.0 PREHYPERTENSION: Primary | ICD-10-CM

## 2023-06-01 DIAGNOSIS — I44.7 LEFT BUNDLE BRANCH BLOCK (LBBB): ICD-10-CM

## 2023-06-01 DIAGNOSIS — I42.9 CARDIOMYOPATHY, UNSPECIFIED TYPE (MULTI): ICD-10-CM

## 2023-06-01 DIAGNOSIS — N40.1 BPH WITH OBSTRUCTION/LOWER URINARY TRACT SYMPTOMS: ICD-10-CM

## 2023-06-01 DIAGNOSIS — N13.8 BPH WITH OBSTRUCTION/LOWER URINARY TRACT SYMPTOMS: ICD-10-CM

## 2023-06-01 DIAGNOSIS — E66.09 CLASS 1 OBESITY DUE TO EXCESS CALORIES WITHOUT SERIOUS COMORBIDITY WITH BODY MASS INDEX (BMI) OF 30.0 TO 30.9 IN ADULT: ICD-10-CM

## 2023-06-01 DIAGNOSIS — R03.0 PREHYPERTENSION: ICD-10-CM

## 2023-06-01 DIAGNOSIS — M79.7 FIBROMYALGIA: ICD-10-CM

## 2023-06-01 DIAGNOSIS — K21.9 GASTROESOPHAGEAL REFLUX DISEASE WITHOUT ESOPHAGITIS: ICD-10-CM

## 2023-06-01 PROBLEM — E66.811 CLASS 1 OBESITY DUE TO EXCESS CALORIES WITHOUT SERIOUS COMORBIDITY WITH BODY MASS INDEX (BMI) OF 30.0 TO 30.9 IN ADULT: Status: ACTIVE | Noted: 2023-06-01

## 2023-06-01 LAB
ALANINE AMINOTRANSFERASE (SGPT) (U/L) IN SER/PLAS: 10 U/L (ref 10–52)
ALBUMIN (G/DL) IN SER/PLAS: 4 G/DL (ref 3.4–5)
ALKALINE PHOSPHATASE (U/L) IN SER/PLAS: 63 U/L (ref 33–136)
ANION GAP IN SER/PLAS: 13 MMOL/L (ref 10–20)
ASPARTATE AMINOTRANSFERASE (SGOT) (U/L) IN SER/PLAS: 12 U/L (ref 9–39)
BASOPHILS (10*3/UL) IN BLOOD BY AUTOMATED COUNT: 0.03 X10E9/L (ref 0–0.1)
BASOPHILS/100 LEUKOCYTES IN BLOOD BY AUTOMATED COUNT: 0.4 % (ref 0–2)
BILIRUBIN TOTAL (MG/DL) IN SER/PLAS: 0.9 MG/DL (ref 0–1.2)
CALCIUM (MG/DL) IN SER/PLAS: 9.2 MG/DL (ref 8.6–10.3)
CARBON DIOXIDE, TOTAL (MMOL/L) IN SER/PLAS: 29 MMOL/L (ref 21–32)
CHLORIDE (MMOL/L) IN SER/PLAS: 109 MMOL/L (ref 98–107)
CHOLESTEROL (MG/DL) IN SER/PLAS: 175 MG/DL (ref 0–199)
CHOLESTEROL IN HDL (MG/DL) IN SER/PLAS: 45.3 MG/DL
CHOLESTEROL/HDL RATIO: 3.9
CREATININE (MG/DL) IN SER/PLAS: 1.17 MG/DL (ref 0.5–1.3)
EOSINOPHILS (10*3/UL) IN BLOOD BY AUTOMATED COUNT: 0.07 X10E9/L (ref 0–0.7)
EOSINOPHILS/100 LEUKOCYTES IN BLOOD BY AUTOMATED COUNT: 1 % (ref 0–6)
ERYTHROCYTE DISTRIBUTION WIDTH (RATIO) BY AUTOMATED COUNT: 13.1 % (ref 11.5–14.5)
ERYTHROCYTE MEAN CORPUSCULAR HEMOGLOBIN CONCENTRATION (G/DL) BY AUTOMATED: 32.1 G/DL (ref 32–36)
ERYTHROCYTE MEAN CORPUSCULAR VOLUME (FL) BY AUTOMATED COUNT: 98 FL (ref 80–100)
ERYTHROCYTES (10*6/UL) IN BLOOD BY AUTOMATED COUNT: 5.33 X10E12/L (ref 4.5–5.9)
ESTIMATED AVERAGE GLUCOSE FOR HBA1C: 114 MG/DL
GFR MALE: 67 ML/MIN/1.73M2
GLUCOSE (MG/DL) IN SER/PLAS: 91 MG/DL (ref 74–99)
HEMATOCRIT (%) IN BLOOD BY AUTOMATED COUNT: 52 % (ref 41–52)
HEMOGLOBIN (G/DL) IN BLOOD: 16.7 G/DL (ref 13.5–17.5)
HEMOGLOBIN A1C/HEMOGLOBIN TOTAL IN BLOOD: 5.6 %
IMMATURE GRANULOCYTES/100 LEUKOCYTES IN BLOOD BY AUTOMATED COUNT: 0.1 % (ref 0–0.9)
LDL: 108 MG/DL (ref 0–99)
LEUKOCYTES (10*3/UL) IN BLOOD BY AUTOMATED COUNT: 7.2 X10E9/L (ref 4.4–11.3)
LYMPHOCYTES (10*3/UL) IN BLOOD BY AUTOMATED COUNT: 1.2 X10E9/L (ref 1.2–4.8)
LYMPHOCYTES/100 LEUKOCYTES IN BLOOD BY AUTOMATED COUNT: 16.6 % (ref 13–44)
MONOCYTES (10*3/UL) IN BLOOD BY AUTOMATED COUNT: 0.57 X10E9/L (ref 0.1–1)
MONOCYTES/100 LEUKOCYTES IN BLOOD BY AUTOMATED COUNT: 7.9 % (ref 2–10)
NEUTROPHILS (10*3/UL) IN BLOOD BY AUTOMATED COUNT: 5.35 X10E9/L (ref 1.2–7.7)
NEUTROPHILS/100 LEUKOCYTES IN BLOOD BY AUTOMATED COUNT: 74 % (ref 40–80)
PLATELETS (10*3/UL) IN BLOOD AUTOMATED COUNT: 182 X10E9/L (ref 150–450)
POTASSIUM (MMOL/L) IN SER/PLAS: 5.1 MMOL/L (ref 3.5–5.3)
PROTEIN TOTAL: 6.5 G/DL (ref 6.4–8.2)
SODIUM (MMOL/L) IN SER/PLAS: 146 MMOL/L (ref 136–145)
TRIGLYCERIDE (MG/DL) IN SER/PLAS: 107 MG/DL (ref 0–149)
UREA NITROGEN (MG/DL) IN SER/PLAS: 21 MG/DL (ref 6–23)
VLDL: 21 MG/DL (ref 0–40)

## 2023-06-01 PROCEDURE — 83036 HEMOGLOBIN GLYCOSYLATED A1C: CPT

## 2023-06-01 PROCEDURE — 36415 COLL VENOUS BLD VENIPUNCTURE: CPT

## 2023-06-01 PROCEDURE — 85025 COMPLETE CBC W/AUTO DIFF WBC: CPT

## 2023-06-01 PROCEDURE — 80061 LIPID PANEL: CPT

## 2023-06-01 PROCEDURE — 1159F MED LIST DOCD IN RCRD: CPT | Performed by: FAMILY MEDICINE

## 2023-06-01 PROCEDURE — 3008F BODY MASS INDEX DOCD: CPT | Performed by: FAMILY MEDICINE

## 2023-06-01 PROCEDURE — 99214 OFFICE O/P EST MOD 30 MIN: CPT | Performed by: FAMILY MEDICINE

## 2023-06-01 PROCEDURE — 80053 COMPREHEN METABOLIC PANEL: CPT

## 2023-06-01 PROCEDURE — 1036F TOBACCO NON-USER: CPT | Performed by: FAMILY MEDICINE

## 2023-06-01 ASSESSMENT — LIFESTYLE VARIABLES
HOW MANY STANDARD DRINKS CONTAINING ALCOHOL DO YOU HAVE ON A TYPICAL DAY: PATIENT DOES NOT DRINK
HOW OFTEN DO YOU HAVE A DRINK CONTAINING ALCOHOL: NEVER
AUDIT-C TOTAL SCORE: 0
HOW OFTEN DO YOU HAVE SIX OR MORE DRINKS ON ONE OCCASION: NEVER
SKIP TO QUESTIONS 9-10: 1

## 2023-06-01 ASSESSMENT — ENCOUNTER SYMPTOMS
OCCASIONAL FEELINGS OF UNSTEADINESS: 0
DEPRESSION: 0
LOSS OF SENSATION IN FEET: 0

## 2023-06-01 NOTE — PATIENT INSTRUCTIONS
Patient to continue current medications (with any exceptions as noted) and diet. Follow-up in 6 month(s) otherwise as needed.      Patient is to complete fasting CBC, CMP, lipid panel, A1c labs today. Will call patient with results when available.     Patient is to follow up with Dr. Gonzalez (cardio), Dr. Hagan (urology) as scheduled.

## 2023-06-29 LAB
ANION GAP IN SER/PLAS: 10 MMOL/L (ref 10–20)
CALCIUM (MG/DL) IN SER/PLAS: 9.2 MG/DL (ref 8.6–10.3)
CARBON DIOXIDE, TOTAL (MMOL/L) IN SER/PLAS: 29 MMOL/L (ref 21–32)
CHLORIDE (MMOL/L) IN SER/PLAS: 112 MMOL/L (ref 98–107)
CREATININE (MG/DL) IN SER/PLAS: 1.29 MG/DL (ref 0.5–1.3)
GFR MALE: 60 ML/MIN/1.73M2
GLUCOSE (MG/DL) IN SER/PLAS: 77 MG/DL (ref 74–99)
NATRIURETIC PEPTIDE B (PG/ML) IN SER/PLAS: 130 PG/ML (ref 0–99)
POTASSIUM (MMOL/L) IN SER/PLAS: 5.5 MMOL/L (ref 3.5–5.3)
SODIUM (MMOL/L) IN SER/PLAS: 145 MMOL/L (ref 136–145)
THYROTROPIN (MIU/L) IN SER/PLAS BY DETECTION LIMIT <= 0.05 MIU/L: 2.95 MIU/L (ref 0.44–3.98)
UREA NITROGEN (MG/DL) IN SER/PLAS: 21 MG/DL (ref 6–23)

## 2023-07-31 LAB
ALBUMIN (G/DL) IN SER/PLAS: 3.7 G/DL (ref 3.4–5)
ANION GAP IN SER/PLAS: 10 MMOL/L (ref 10–20)
CALCIUM (MG/DL) IN SER/PLAS: 8.9 MG/DL (ref 8.6–10.3)
CARBON DIOXIDE, TOTAL (MMOL/L) IN SER/PLAS: 28 MMOL/L (ref 21–32)
CHLORIDE (MMOL/L) IN SER/PLAS: 110 MMOL/L (ref 98–107)
CREATININE (MG/DL) IN SER/PLAS: 1.23 MG/DL (ref 0.5–1.3)
GFR MALE: 63 ML/MIN/1.73M2
GLUCOSE (MG/DL) IN SER/PLAS: 95 MG/DL (ref 74–99)
PHOSPHATE (MG/DL) IN SER/PLAS: 2.6 MG/DL (ref 2.5–4.9)
POTASSIUM (MMOL/L) IN SER/PLAS: 4.5 MMOL/L (ref 3.5–5.3)
SODIUM (MMOL/L) IN SER/PLAS: 143 MMOL/L (ref 136–145)
UREA NITROGEN (MG/DL) IN SER/PLAS: 22 MG/DL (ref 6–23)

## 2023-08-16 PROBLEM — I42.8: Status: ACTIVE | Noted: 2023-08-16

## 2023-08-16 PROBLEM — R94.39 ABNORMAL NUCLEAR STRESS TEST: Status: ACTIVE | Noted: 2023-08-16

## 2023-08-16 PROBLEM — I51.89 MASS OF LEFT CARDIAC VENTRICLE: Status: ACTIVE | Noted: 2023-08-16

## 2023-08-16 PROBLEM — I51.89 CARDIAC MASS: Status: ACTIVE | Noted: 2023-08-16

## 2023-08-16 PROBLEM — R94.31 ABNORMAL EKG: Status: ACTIVE | Noted: 2023-08-16

## 2023-08-16 PROBLEM — R06.02 EXERTIONAL SHORTNESS OF BREATH: Status: ACTIVE | Noted: 2023-08-16

## 2023-08-16 PROBLEM — I10 HYPERTENSION: Status: ACTIVE | Noted: 2023-08-16

## 2023-08-16 PROBLEM — Z71.89 ENCOUNTER FOR MEDICATION COUNSELING: Status: ACTIVE | Noted: 2023-08-16

## 2023-08-16 PROBLEM — I50.9 CHF (CONGESTIVE HEART FAILURE) (MULTI): Status: ACTIVE | Noted: 2023-08-16

## 2023-08-16 PROBLEM — I50.9 CHF (NYHA CLASS III, ACC/AHA STAGE C) (MULTI): Status: ACTIVE | Noted: 2023-08-16

## 2023-08-16 PROBLEM — L72.0 EPIDERMAL INCLUSION CYST: Status: ACTIVE | Noted: 2023-08-16

## 2023-08-16 RX ORDER — SPIRONOLACTONE 25 MG/1
TABLET ORAL
COMMUNITY
Start: 2023-06-07 | End: 2024-04-23 | Stop reason: SDUPTHER

## 2023-08-17 LAB — PROSTATE SPECIFIC AG (NG/ML) IN SER/PLAS: 3.77 NG/ML (ref 0–4)

## 2023-09-14 LAB
ANION GAP IN SER/PLAS: 13 MMOL/L (ref 10–20)
CALCIUM (MG/DL) IN SER/PLAS: 8.8 MG/DL (ref 8.6–10.3)
CARBON DIOXIDE, TOTAL (MMOL/L) IN SER/PLAS: 25 MMOL/L (ref 21–32)
CHLORIDE (MMOL/L) IN SER/PLAS: 111 MMOL/L (ref 98–107)
CREATININE (MG/DL) IN SER/PLAS: 1.14 MG/DL (ref 0.5–1.3)
ERYTHROCYTE DISTRIBUTION WIDTH (RATIO) BY AUTOMATED COUNT: 13.2 % (ref 11.5–14.5)
ERYTHROCYTE MEAN CORPUSCULAR HEMOGLOBIN CONCENTRATION (G/DL) BY AUTOMATED: 32.4 G/DL (ref 32–36)
ERYTHROCYTE MEAN CORPUSCULAR VOLUME (FL) BY AUTOMATED COUNT: 97 FL (ref 80–100)
ERYTHROCYTES (10*6/UL) IN BLOOD BY AUTOMATED COUNT: 4.89 X10E12/L (ref 4.5–5.9)
GFR MALE: 69 ML/MIN/1.73M2
GLUCOSE (MG/DL) IN SER/PLAS: 105 MG/DL (ref 74–99)
HEMATOCRIT (%) IN BLOOD BY AUTOMATED COUNT: 47.5 % (ref 41–52)
HEMOGLOBIN (G/DL) IN BLOOD: 15.4 G/DL (ref 13.5–17.5)
INR IN PPP BY COAGULATION ASSAY: 1 (ref 0.9–1.1)
LEUKOCYTES (10*3/UL) IN BLOOD BY AUTOMATED COUNT: 6.1 X10E9/L (ref 4.4–11.3)
PLATELETS (10*3/UL) IN BLOOD AUTOMATED COUNT: 182 X10E9/L (ref 150–450)
POTASSIUM (MMOL/L) IN SER/PLAS: 4.5 MMOL/L (ref 3.5–5.3)
PROTHROMBIN TIME (PT) IN PPP BY COAGULATION ASSAY: 11.5 SEC (ref 9.8–12.8)
SODIUM (MMOL/L) IN SER/PLAS: 144 MMOL/L (ref 136–145)
UREA NITROGEN (MG/DL) IN SER/PLAS: 22 MG/DL (ref 6–23)

## 2023-09-20 ENCOUNTER — HOSPITAL ENCOUNTER (OUTPATIENT)
Dept: DATA CONVERSION | Facility: HOSPITAL | Age: 68
End: 2023-09-20
Attending: INTERNAL MEDICINE | Admitting: INTERNAL MEDICINE
Payer: MEDICARE

## 2023-09-20 DIAGNOSIS — I42.8 OTHER CARDIOMYOPATHIES (MULTI): ICD-10-CM

## 2023-09-20 DIAGNOSIS — I50.9 HEART FAILURE, UNSPECIFIED (MULTI): ICD-10-CM

## 2023-09-20 LAB
ACTIVATED PARTIAL THROMBOPLASTIN TIME IN PPP BY COAGULATION ASSAY: NORMAL
ANION GAP IN SER/PLAS: NORMAL
ATRIAL RATE: 119 BPM
ATRIAL RATE: 80 BPM
ATRIAL RATE: 90 BPM
CALCIUM (MG/DL) IN SER/PLAS: NORMAL
CARBON DIOXIDE, TOTAL (MMOL/L) IN SER/PLAS: NORMAL
CHLORIDE (MMOL/L) IN SER/PLAS: NORMAL
CREATININE (MG/DL) IN SER/PLAS: NORMAL
ERYTHROCYTE DISTRIBUTION WIDTH (RATIO) BY AUTOMATED COUNT: NORMAL
ERYTHROCYTE MEAN CORPUSCULAR HEMOGLOBIN CONCENTRATION (G/DL) BY AUTOMATED: NORMAL
ERYTHROCYTE MEAN CORPUSCULAR VOLUME (FL) BY AUTOMATED COUNT: NORMAL
ERYTHROCYTES (10*6/UL) IN BLOOD BY AUTOMATED COUNT: NORMAL
GFR FEMALE: NORMAL
GFR MALE: NORMAL
GLUCOSE (MG/DL) IN SER/PLAS: NORMAL
HEMATOCRIT (%) IN BLOOD BY AUTOMATED COUNT: NORMAL
HEMOGLOBIN (G/DL) IN BLOOD: NORMAL
INR IN PPP BY COAGULATION ASSAY: NORMAL
LEUKOCYTES (10*3/UL) IN BLOOD BY AUTOMATED COUNT: NORMAL
NRBC (PER 100 WBCS) BY AUTOMATED COUNT: NORMAL
P AXIS: 20 DEGREES
P AXIS: 39 DEGREES
P OFFSET: 173 MS
P OFFSET: 175 MS
P ONSET: 116 MS
P ONSET: 122 MS
PLATELETS (10*3/UL) IN BLOOD AUTOMATED COUNT: NORMAL
POTASSIUM (MMOL/L) IN SER/PLAS: NORMAL
PR INTERVAL: 176 MS
PR INTERVAL: 184 MS
PROTHROMBIN TIME (PT) IN PPP BY COAGULATION ASSAY: NORMAL
Q ONSET: 206 MS
Q ONSET: 208 MS
Q ONSET: 210 MS
QRS COUNT: 13 BEATS
QRS COUNT: 15 BEATS
QRS COUNT: 19 BEATS
QRS DURATION: 152 MS
QRS DURATION: 158 MS
QRS DURATION: 164 MS
QT INTERVAL: 354 MS
QT INTERVAL: 430 MS
QT INTERVAL: 430 MS
QTC CALCULATION(BAZETT): 495 MS
QTC CALCULATION(BAZETT): 497 MS
QTC CALCULATION(BAZETT): 526 MS
QTC FREDERICIA: 444 MS
QTC FREDERICIA: 473 MS
QTC FREDERICIA: 492 MS
R AXIS: -4 DEGREES
R AXIS: 24 DEGREES
R AXIS: 3 DEGREES
SODIUM (MMOL/L) IN SER/PLAS: NORMAL
T AXIS: -12 DEGREES
T AXIS: 158 DEGREES
T AXIS: 203 DEGREES
T OFFSET: 383 MS
T OFFSET: 423 MS
T OFFSET: 425 MS
UREA NITROGEN (MG/DL) IN SER/PLAS: NORMAL
VENTRICULAR RATE: 119 BPM
VENTRICULAR RATE: 80 BPM
VENTRICULAR RATE: 90 BPM

## 2023-09-29 VITALS — BODY MASS INDEX: 30.13 KG/M2 | WEIGHT: 242.29 LBS | HEIGHT: 75 IN

## 2023-09-30 NOTE — H&P
History of Present Illness:   History Present Illness:  Reason for surgery: Cardiomyopathy, LVEF below 35%,  LBBB   HPI:    He has HICKMAN with ADL's. Meds have been optimized. BP limits medications.  LVEF is below 35%.  ECG shows LBBB.    Shared decision making performed. [place] decision tool used.  Informed consent confirmd.    He is here for biventricular ICD implant.    Past Medical History:        Surg History:   Postoperative pain:     Allergies:        Allergies:  ·  No Known Allergies :     Home Medication Review:   Home Medications Reviewed: yes  see MAR     Impression/Procedure:   ·  Impression and Planned Procedure: Cardiomyopathy, LVEF below 35%, LBBB, for biv ICD implant       ERAS (Enhanced Recovery After Surgery):  ·  ERAS Patient: no     Review of Systems:   Review of Systems:  Constitutional: NEGATIVE: Fever, Chills     Respiratory: POSITIVE: Shortness of Breath; NEGATIVE:  Dry Cough, Productive Cough     Cardiac: POSITIVE: Dyspnea on Exertion; NEGATIVE:  Chest Pain, Orthopnea, Palpitations, Syncope     All Other Systems: All other systems reviewed and  are negative       Physical Exam Narrative:  ·  Physical Exam:    VS noted    Physical Exam by System:    Constitutional: Well developed, awake/alert/oriented  x3, no distress, alert and cooperative   Eyes: EOMI, clear sclera   ENMT: mucous membranes moist, no apparent injury,  no lesions seen   Head/Neck: Neck supple, no thyromegaly, No JVD, trachea  midline   Respiratory/Thorax: Patent airways, CTA bilaterally,  no wheezing, normal breath sounds with good chest expansion, thorax symmetric   Cardiovascular: Lateral PMI, regular, rate and rhythm,  , normal S 1and S 2, no murmurs, 2+ equal pulses radial, brachial, DP pulses   Gastrointestinal: Nondistended, BS +,  no bruits,  soft, non-tender, no guarding   Genitourinary: deferred   Musculoskeletal: ROM intact, no joint swelling, normal  strength   Extremities: normal extremities; no cyanosis,  edema,  or contusions, no clubbing   Neurological: alert and oriented x3, intact senses,  motor normal strength; normal gait   Breast: deferred   Lymphatic: No cervical lymphadenopathy   Psychological: Appropriate mood and behavior   Skin: Warm and dry, no lesions, no rashes     Airway/Sedation Assessment:  ·  Emotional Status calm   ·  Neurologic alert & oriented x 3   ·  Respiratory clear to auscultation   ·  Cardiovascular rhythm & rate regular   ·  GI/ soft, nontender     · Pulses present: Pedal Left, Pedal Right, Radial Left, Radial Right     ·  Mouth Opening OK yes   ·  Neck Flexibility OK yes   ·  Loose Teeth no   ·  Oropharyngeal Classification Class II   ·  ASA PS Classification ASA III   ·  Sedation Plan moderate sedation     Consent:   COVID-19 Consent:  ·  COVID-19 Risk Consent Surgeon has reviewed key risks related to the risk of rosemary COVID-19 and if they contract COVID-19 what the risks are.       Electronic Signatures:  Deborah Lawler)  (Signed 20-Sep-2023 12:47)   Authored: History of Present Illness, Past Medical History,  Allergies, Home Medication Review, Impression/Procedure, ERAS, Review of Systems, Physical Exam, Consent, Note Completion      Last Updated: 20-Sep-2023 12:47 by Deborah Lawler)

## 2023-10-04 ENCOUNTER — OFFICE VISIT (OUTPATIENT)
Dept: CARDIOLOGY | Facility: CLINIC | Age: 68
End: 2023-10-04
Payer: MEDICARE

## 2023-10-04 VITALS
DIASTOLIC BLOOD PRESSURE: 76 MMHG | WEIGHT: 239.6 LBS | HEIGHT: 75 IN | SYSTOLIC BLOOD PRESSURE: 118 MMHG | BODY MASS INDEX: 29.79 KG/M2 | HEART RATE: 80 BPM

## 2023-10-04 DIAGNOSIS — I50.9 CHF (NYHA CLASS III, ACC/AHA STAGE C) (MULTI): ICD-10-CM

## 2023-10-04 DIAGNOSIS — R03.0 PREHYPERTENSION: ICD-10-CM

## 2023-10-04 DIAGNOSIS — I42.8 LEFT VENTRICULAR NON-COMPACTION CARDIOMYOPATHY (MULTI): ICD-10-CM

## 2023-10-04 DIAGNOSIS — I10 HYPERTENSION, UNSPECIFIED TYPE: ICD-10-CM

## 2023-10-04 DIAGNOSIS — E66.09 CLASS 1 OBESITY DUE TO EXCESS CALORIES WITHOUT SERIOUS COMORBIDITY WITH BODY MASS INDEX (BMI) OF 30.0 TO 30.9 IN ADULT: ICD-10-CM

## 2023-10-04 PROCEDURE — 1036F TOBACCO NON-USER: CPT | Performed by: INTERNAL MEDICINE

## 2023-10-04 PROCEDURE — 3078F DIAST BP <80 MM HG: CPT | Performed by: INTERNAL MEDICINE

## 2023-10-04 PROCEDURE — 3008F BODY MASS INDEX DOCD: CPT | Performed by: INTERNAL MEDICINE

## 2023-10-04 PROCEDURE — 3074F SYST BP LT 130 MM HG: CPT | Performed by: INTERNAL MEDICINE

## 2023-10-04 PROCEDURE — 1126F AMNT PAIN NOTED NONE PRSNT: CPT | Performed by: INTERNAL MEDICINE

## 2023-10-04 PROCEDURE — 99214 OFFICE O/P EST MOD 30 MIN: CPT | Performed by: INTERNAL MEDICINE

## 2023-10-04 PROCEDURE — 1159F MED LIST DOCD IN RCRD: CPT | Performed by: INTERNAL MEDICINE

## 2023-10-04 ASSESSMENT — ENCOUNTER SYMPTOMS
CONSTITUTIONAL NEGATIVE: 1
NEUROLOGICAL NEGATIVE: 1
CARDIOVASCULAR NEGATIVE: 1
RESPIRATORY NEGATIVE: 1

## 2023-10-04 NOTE — PROGRESS NOTES
CARDIOLOGY OFFICE VISIT      CHIEF COMPLAINT  Chief Complaint   Patient presents with    Follow-up     3 month       HISTORY OF PRESENT ILLNESS  76-year-old gentleman with a history of nonischemic cardiomyopathy and cardiac catheterization that showed normal coronaries in February 2023.  Initial EF was about 15 to 20% with a dilated left ventricle measuring 6.42 cm and no significant valvular regurgitation.  Cardiac MRI shows normal left ventricular cavity size with EF of 22% normal delayed hyperenhancement.  There was question of possible noncompaction and he subsequently was seen by EP s/p BiV AICD implantation.  He said he has continued to do well with no chest pain or significant shortness of breath.  Initial BNP was 279 which came down to 130.  Potassium was mildly elevated at 5.5 his spironolactone was reduced, with a repeat potassium of 4.5.  He said he has been doing much better with stable creatinine of 1.23.  Labs from June 2023 shows cholesterol is 175, HDL is 45, LDL is 108 and triglycerides 107    ASSESSMENT AND PLAN:  1.  Dilated nonischemic cardiomyopathy: With severe LV dysfunction as noted above on optimal GDMT.  S/p BiV AICD as noted above.  We will continue with current heart failure medications.  2.  Hypertension: Blood pressures well controlled current medications.  3.  Dyslipidemia: With acceptable lipid profile as noted above.    Past Medical History  Past Medical History:   Diagnosis Date    Acute maxillary sinusitis, unspecified 07/27/2020    Subacute maxillary sinusitis    Cellulitis of right finger 06/03/2019    Paronychia of finger of right hand    Encounter for immunization     Encounter for immunization    Encounter for screening for lipoid disorders 12/03/2019    Screening, lipid    Encounter for screening for malignant neoplasm of colon 05/13/2021    Encounter for screening for colorectal malignant neoplasm    Encounter for screening for malignant neoplasm of prostate 12/03/2019     "Screening PSA (prostate specific antigen)    Impingement syndrome of left shoulder     Impingement syndrome of left shoulder    Personal history of other diseases of the musculoskeletal system and connective tissue     History of arthritis    Personal history of other drug therapy 05/28/2020    History of pneumococcal vaccination    Personal history of other specified conditions 08/29/2019    History of fatigue       Social History  Social History     Tobacco Use    Smoking status: Never     Passive exposure: Never    Smokeless tobacco: Never   Substance Use Topics    Alcohol use: Never    Drug use: Never       Family History     Family History   Problem Relation Name Age of Onset    Diabetes type II Mother      Hypertension Mother      Diabetes Mother      Other (CEREBRALVASCULAR ACCIDENT) Mother      Hypertension Father      Other (URINARY BLADDER CA) Father          Allergies:  No Known Allergies     Outpatient Medications:  Current Outpatient Medications   Medication Instructions    apixaban (Eliquis) 5 mg tablet TAKE 1 TABLET BY MOUTH TWO TIMES A DAY    Entresto 24-26 mg tablet take 1 tablet by mouth twice a day    Jardiance 10 mg, oral, Daily    metoprolol succinate XL (Toprol-XL) 25 mg 24 hr tablet take 1 tablet by mouth every day    spironolactone (Aldactone) 25 mg tablet TAKE 0.5 TABLET Daily    tamsulosin (Flomax) 0.4 mg 24 hr capsule take 1 capsule by mouth twice a day      Lab Results   Component Value Date    CHOL 175 06/01/2023    CHOL 173 05/16/2022    CHOL 172 05/10/2021     Lab Results   Component Value Date    HDL 45.3 06/01/2023    HDL 42.0 05/16/2022    HDL 41.0 05/10/2021     No results found for: \"LDLCALC\"  Lab Results   Component Value Date    TRIG 107 06/01/2023    TRIG 135 05/16/2022    TRIG 114 05/10/2021     No components found for: \"CHOLHDL\"        REVIEW OF SYSTEMS  Review of Systems   Constitutional: Negative.   Cardiovascular: Negative.    Respiratory: Negative.     Neurological: " Negative.          VITALS  Vitals:    10/04/23 0918   BP: 118/76   Pulse: 80       PHYSICAL EXAM  Constitutional:       Appearance: Healthy appearance. Not in distress.   Eyes:      Pupils: Pupils are equal, round, and reactive to light.   Neck:      Vascular: No JVR. JVD normal.   Pulmonary:      Effort: Pulmonary effort is normal.      Breath sounds: Normal breath sounds. No wheezing. No rhonchi. No rales.   Chest:      Chest wall: Not tender to palpatation.   Cardiovascular:      PMI at left midclavicular line. Normal rate. Regular rhythm.      Murmurs: There is no murmur.      No gallop.  No click. No rub.   Pulses:     Intact distal pulses.   Abdominal:      General: Bowel sounds are normal.      Palpations: Abdomen is soft.      Tenderness: There is no abdominal tenderness.   Musculoskeletal: Normal range of motion.         General: No tenderness.      Cervical back: Normal range of motion. Skin:     General: Skin is warm and dry.   Neurological:      General: No focal deficit present.      Mental Status: Alert and oriented to person, place and time.           ASSESSMENT AND PLAN  Problem List Items Addressed This Visit          Cardiac and Vasculature    Prehypertension    Hypertension    Left ventricular non-compaction cardiomyopathy (CMS/HCC)    CHF (NYHA class III, ACC/AHA stage C) (CMS/HCC)       Endocrine/Metabolic    Class 1 obesity due to excess calories without serious comorbidity with body mass index (BMI) of 30.0 to 30.9 in adult       [unfilled]

## 2023-10-10 ENCOUNTER — HOSPITAL ENCOUNTER (OUTPATIENT)
Dept: CARDIOLOGY | Facility: HOSPITAL | Age: 68
Discharge: HOME | End: 2023-10-10
Payer: MEDICARE

## 2023-10-10 DIAGNOSIS — Z95.810 ICD (IMPLANTABLE CARDIOVERTER-DEFIBRILLATOR) IN PLACE: ICD-10-CM

## 2023-10-10 DIAGNOSIS — Z95.810 ICD (IMPLANTABLE CARDIOVERTER-DEFIBRILLATOR) IN PLACE: Primary | ICD-10-CM

## 2023-10-10 DIAGNOSIS — I50.9 OTHER CONGESTIVE HEART FAILURE (MULTI): ICD-10-CM

## 2023-11-02 ENCOUNTER — HOSPITAL ENCOUNTER (OUTPATIENT)
Dept: CARDIOLOGY | Facility: HOSPITAL | Age: 68
Discharge: HOME | End: 2023-11-02
Payer: MEDICARE

## 2023-11-02 DIAGNOSIS — I50.9 OTHER CONGESTIVE HEART FAILURE (MULTI): ICD-10-CM

## 2023-11-02 DIAGNOSIS — Z95.810 ICD (IMPLANTABLE CARDIOVERTER-DEFIBRILLATOR) IN PLACE: ICD-10-CM

## 2023-11-22 ENCOUNTER — HOSPITAL ENCOUNTER (EMERGENCY)
Facility: HOSPITAL | Age: 68
Discharge: HOME | End: 2023-11-22
Payer: MEDICARE

## 2023-11-22 ENCOUNTER — APPOINTMENT (OUTPATIENT)
Dept: RADIOLOGY | Facility: HOSPITAL | Age: 68
End: 2023-11-22
Payer: MEDICARE

## 2023-11-22 VITALS
WEIGHT: 238 LBS | DIASTOLIC BLOOD PRESSURE: 67 MMHG | HEIGHT: 75 IN | SYSTOLIC BLOOD PRESSURE: 127 MMHG | OXYGEN SATURATION: 95 % | TEMPERATURE: 98.4 F | HEART RATE: 97 BPM | BODY MASS INDEX: 29.59 KG/M2 | RESPIRATION RATE: 18 BRPM

## 2023-11-22 DIAGNOSIS — M25.571 ACUTE RIGHT ANKLE PAIN: ICD-10-CM

## 2023-11-22 DIAGNOSIS — M25.571 RIGHT ANKLE PAIN, UNSPECIFIED CHRONICITY: Primary | ICD-10-CM

## 2023-11-22 PROCEDURE — 99283 EMERGENCY DEPT VISIT LOW MDM: CPT | Mod: 25 | Performed by: PHYSICIAN ASSISTANT

## 2023-11-22 PROCEDURE — 2500000004 HC RX 250 GENERAL PHARMACY W/ HCPCS (ALT 636 FOR OP/ED): Performed by: PHYSICIAN ASSISTANT

## 2023-11-22 PROCEDURE — 99285 EMERGENCY DEPT VISIT HI MDM: CPT | Mod: 25

## 2023-11-22 PROCEDURE — 73610 X-RAY EXAM OF ANKLE: CPT | Mod: RT

## 2023-11-22 PROCEDURE — 2500000001 HC RX 250 WO HCPCS SELF ADMINISTERED DRUGS (ALT 637 FOR MEDICARE OP): Performed by: PHYSICIAN ASSISTANT

## 2023-11-22 PROCEDURE — 73610 X-RAY EXAM OF ANKLE: CPT | Mod: RIGHT SIDE | Performed by: RADIOLOGY

## 2023-11-22 RX ORDER — PREDNISONE 20 MG/1
20 TABLET ORAL 2 TIMES DAILY
Qty: 10 TABLET | Refills: 0 | Status: SHIPPED | OUTPATIENT
Start: 2023-11-22 | End: 2023-12-04 | Stop reason: ALTCHOICE

## 2023-11-22 RX ORDER — COLCHICINE 0.6 MG/1
0.6 TABLET ORAL 2 TIMES DAILY
Qty: 6 TABLET | Refills: 0 | Status: SHIPPED | OUTPATIENT
Start: 2023-11-22 | End: 2023-11-25

## 2023-11-22 RX ORDER — PREDNISONE 20 MG/1
40 TABLET ORAL ONCE
Status: COMPLETED | OUTPATIENT
Start: 2023-11-22 | End: 2023-11-22

## 2023-11-22 RX ORDER — COLCHICINE 0.6 MG/1
1.2 TABLET ORAL ONCE
Status: COMPLETED | OUTPATIENT
Start: 2023-11-22 | End: 2023-11-22

## 2023-11-22 RX ADMIN — COLCHICINE 1.2 MG: 0.6 TABLET, FILM COATED ORAL at 17:29

## 2023-11-22 RX ADMIN — PREDNISONE 40 MG: 20 TABLET ORAL at 17:29

## 2023-11-22 ASSESSMENT — PAIN DESCRIPTION - LOCATION: LOCATION: ANKLE

## 2023-11-22 ASSESSMENT — LIFESTYLE VARIABLES
REASON UNABLE TO ASSESS: NO
EVER HAD A DRINK FIRST THING IN THE MORNING TO STEADY YOUR NERVES TO GET RID OF A HANGOVER: NO
HAVE YOU EVER FELT YOU SHOULD CUT DOWN ON YOUR DRINKING: NO
EVER FELT BAD OR GUILTY ABOUT YOUR DRINKING: NO
HAVE PEOPLE ANNOYED YOU BY CRITICIZING YOUR DRINKING: NO

## 2023-11-22 ASSESSMENT — PAIN SCALES - GENERAL: PAINLEVEL_OUTOF10: 2

## 2023-11-22 ASSESSMENT — PAIN DESCRIPTION - PAIN TYPE: TYPE: ACUTE PAIN

## 2023-11-22 ASSESSMENT — PAIN - FUNCTIONAL ASSESSMENT: PAIN_FUNCTIONAL_ASSESSMENT: 0-10

## 2023-11-22 ASSESSMENT — COLUMBIA-SUICIDE SEVERITY RATING SCALE - C-SSRS
6. HAVE YOU EVER DONE ANYTHING, STARTED TO DO ANYTHING, OR PREPARED TO DO ANYTHING TO END YOUR LIFE?: NO
2. HAVE YOU ACTUALLY HAD ANY THOUGHTS OF KILLING YOURSELF?: NO
1. IN THE PAST MONTH, HAVE YOU WISHED YOU WERE DEAD OR WISHED YOU COULD GO TO SLEEP AND NOT WAKE UP?: NO

## 2023-11-22 ASSESSMENT — PAIN DESCRIPTION - ORIENTATION: ORIENTATION: RIGHT

## 2023-11-22 NOTE — ED TRIAGE NOTES
Pt went to bathroom around 3am this morning, then woke up at 6am and unable to put weight on right ankle. Pt states 7/10 pain when standing on it. Pt denies injury to ankle.

## 2023-11-22 NOTE — ED PROVIDER NOTES
"HPI   Chief Complaint   Patient presents with    Ankle Pain       68-year-old male presents to the emergency department for complaints of right ankle pain.  States \"I think it is gout.  \"States he ate scallops last night and this morning he woke up with severe pain in the right ankle.  States it is extremely painful to move and bear weight.  He states that he has had pain like this in the past but he always took Advil and it would go away in a few hours.  He states that he was started on Eliquis a few months ago after a valve replacement so he has been unable to take any anti-inflammatories so he only took Tylenol today which did not really help.  He denies fevers, flulike symptoms, body aches and chills, vomiting, recent surgeries, inability to move the ankle.                           No data recorded                Patient History   Past Medical History:   Diagnosis Date    Acute maxillary sinusitis, unspecified 07/27/2020    Subacute maxillary sinusitis    Cellulitis of right finger 06/03/2019    Paronychia of finger of right hand    Encounter for immunization     Encounter for immunization    Encounter for screening for lipoid disorders 12/03/2019    Screening, lipid    Encounter for screening for malignant neoplasm of colon 05/13/2021    Encounter for screening for colorectal malignant neoplasm    Encounter for screening for malignant neoplasm of prostate 12/03/2019    Screening PSA (prostate specific antigen)    Impingement syndrome of left shoulder     Impingement syndrome of left shoulder    Personal history of other diseases of the musculoskeletal system and connective tissue     History of arthritis    Personal history of other drug therapy 05/28/2020    History of pneumococcal vaccination    Personal history of other specified conditions 08/29/2019    History of fatigue     Past Surgical History:   Procedure Laterality Date    CARDIAC DEFIBRILLATOR PLACEMENT      OTHER SURGICAL HISTORY  10/19/2021    " Prostate biopsy    OTHER SURGICAL HISTORY  06/15/2021    Hernia repair    OTHER SURGICAL HISTORY  06/15/2021    Colonoscopy     Family History   Problem Relation Name Age of Onset    Diabetes type II Mother      Hypertension Mother      Diabetes Mother      Other (CEREBRALVASCULAR ACCIDENT) Mother      Hypertension Father      Other (URINARY BLADDER CA) Father       Social History     Tobacco Use    Smoking status: Never     Passive exposure: Never    Smokeless tobacco: Never   Substance Use Topics    Alcohol use: Never    Drug use: Never       Physical Exam   ED Triage Vitals [11/22/23 1704]   Temp Heart Rate Resp BP   36.9 °C (98.4 °F) 97 18 127/67      SpO2 Temp Source Heart Rate Source Patient Position   95 % Temporal Monitor --      BP Location FiO2 (%)     -- --       Physical Exam  Vitals and nursing note reviewed.   Constitutional:       General: He is not in acute distress.  HENT:      Head: Atraumatic.      Mouth/Throat:      Mouth: Mucous membranes are moist.      Pharynx: Oropharynx is clear.   Eyes:      Extraocular Movements: Extraocular movements intact.      Conjunctiva/sclera: Conjunctivae normal.      Pupils: Pupils are equal, round, and reactive to light.   Cardiovascular:      Rate and Rhythm: Normal rate and regular rhythm.      Pulses: Normal pulses.   Pulmonary:      Effort: Pulmonary effort is normal. No respiratory distress.      Breath sounds: Normal breath sounds.   Abdominal:      General: There is no distension.      Palpations: Abdomen is soft.      Tenderness: There is no abdominal tenderness. There is no guarding or rebound.   Musculoskeletal:         General: No deformity.      Cervical back: Neck supple.      Comments: Full range of motion of the right ankle is intact, although patient complains of pain with movement.  2+ pedal pulses intact.  No skin changes, edema, evidence of trauma, deformity.  Tenderness to palpation over the right medial malleolus.  No calf tenderness to  palpation.  Negative Homans' sign.   Skin:     General: Skin is warm and dry.   Neurological:      Mental Status: He is alert and oriented to person, place, and time. Mental status is at baseline.      Cranial Nerves: No cranial nerve deficit.      Sensory: No sensory deficit.      Motor: No weakness.   Psychiatric:         Mood and Affect: Mood normal.         Behavior: Behavior normal.         ED Course & MDM   Diagnoses as of 11/22/23 1841   Right ankle pain, unspecified chronicity   Acute right ankle pain       Medical Decision Making  68-year-old male presents emergency department for complaints of pain with no injury of the right ankle.  On my exam, patient does not any skin changes, deformity, edema.  He demonstrates full range of motion of the ankle, though complains of pain with movement.  He is neurovascularly intact.  No clinical evidence of a DVT like calf tenderness to palpation, calf swelling or Homans' sign.  X-ray of the right ankle does not show any acute findings.  Patient treated here with colchicine and prednisone, as I suspect gout.  I prescribed the same for home.  I recommended RICE.  Recommended close follow-up with orthopedics.  Discussed results with patient and/or family/friend and recommended close follow up with primary care or specialist.  Reviewed return precautions at length.  I answered all questions.           Procedure  Procedures     Verna Jenkins PA-C  11/22/23 9399

## 2023-12-04 ENCOUNTER — OFFICE VISIT (OUTPATIENT)
Dept: PRIMARY CARE | Facility: CLINIC | Age: 68
End: 2023-12-04
Payer: MEDICARE

## 2023-12-04 VITALS
BODY MASS INDEX: 29.84 KG/M2 | HEIGHT: 75 IN | SYSTOLIC BLOOD PRESSURE: 105 MMHG | RESPIRATION RATE: 16 BRPM | TEMPERATURE: 97.6 F | WEIGHT: 240 LBS | OXYGEN SATURATION: 96 % | HEART RATE: 80 BPM | DIASTOLIC BLOOD PRESSURE: 71 MMHG

## 2023-12-04 DIAGNOSIS — I25.5 ISCHEMIC CARDIOMYOPATHY: ICD-10-CM

## 2023-12-04 DIAGNOSIS — K21.9 GASTROESOPHAGEAL REFLUX DISEASE WITHOUT ESOPHAGITIS: ICD-10-CM

## 2023-12-04 DIAGNOSIS — I50.9 CHF (NYHA CLASS III, ACC/AHA STAGE C) (MULTI): ICD-10-CM

## 2023-12-04 DIAGNOSIS — Z13.220 LIPID SCREENING: ICD-10-CM

## 2023-12-04 DIAGNOSIS — Z00.00 ROUTINE GENERAL MEDICAL EXAMINATION AT HEALTH CARE FACILITY: Primary | ICD-10-CM

## 2023-12-04 DIAGNOSIS — N40.1 BPH WITH OBSTRUCTION/LOWER URINARY TRACT SYMPTOMS: ICD-10-CM

## 2023-12-04 DIAGNOSIS — E66.09 CLASS 1 OBESITY DUE TO EXCESS CALORIES WITHOUT SERIOUS COMORBIDITY WITH BODY MASS INDEX (BMI) OF 30.0 TO 30.9 IN ADULT: ICD-10-CM

## 2023-12-04 DIAGNOSIS — Z11.59 NEED FOR HEPATITIS C SCREENING TEST: ICD-10-CM

## 2023-12-04 DIAGNOSIS — N13.8 BPH WITH OBSTRUCTION/LOWER URINARY TRACT SYMPTOMS: ICD-10-CM

## 2023-12-04 DIAGNOSIS — M79.7 FIBROMYALGIA: ICD-10-CM

## 2023-12-04 DIAGNOSIS — R73.9 HYPERGLYCEMIA: ICD-10-CM

## 2023-12-04 DIAGNOSIS — J06.9 ACUTE URI: ICD-10-CM

## 2023-12-04 DIAGNOSIS — R03.0 PREHYPERTENSION: ICD-10-CM

## 2023-12-04 PROCEDURE — 1170F FXNL STATUS ASSESSED: CPT | Performed by: FAMILY MEDICINE

## 2023-12-04 PROCEDURE — 3008F BODY MASS INDEX DOCD: CPT | Performed by: FAMILY MEDICINE

## 2023-12-04 PROCEDURE — 1160F RVW MEDS BY RX/DR IN RCRD: CPT | Performed by: FAMILY MEDICINE

## 2023-12-04 PROCEDURE — 87636 SARSCOV2 & INF A&B AMP PRB: CPT

## 2023-12-04 PROCEDURE — 1126F AMNT PAIN NOTED NONE PRSNT: CPT | Performed by: FAMILY MEDICINE

## 2023-12-04 PROCEDURE — G0439 PPPS, SUBSEQ VISIT: HCPCS | Performed by: FAMILY MEDICINE

## 2023-12-04 PROCEDURE — 1036F TOBACCO NON-USER: CPT | Performed by: FAMILY MEDICINE

## 2023-12-04 PROCEDURE — 1159F MED LIST DOCD IN RCRD: CPT | Performed by: FAMILY MEDICINE

## 2023-12-04 PROCEDURE — 99214 OFFICE O/P EST MOD 30 MIN: CPT | Performed by: FAMILY MEDICINE

## 2023-12-04 ASSESSMENT — ENCOUNTER SYMPTOMS
OCCASIONAL FEELINGS OF UNSTEADINESS: 0
LOSS OF SENSATION IN FEET: 0
DEPRESSION: 0

## 2023-12-04 ASSESSMENT — ACTIVITIES OF DAILY LIVING (ADL)
DOING_HOUSEWORK: INDEPENDENT
GROCERY_SHOPPING: INDEPENDENT
BATHING: INDEPENDENT
MANAGING_FINANCES: INDEPENDENT
TAKING_MEDICATION: INDEPENDENT
DRESSING: INDEPENDENT

## 2023-12-04 NOTE — PROGRESS NOTES
"Subjective   Patient ID: Alex Alba is a 68 y.o. male who presents for Medicare Annual Wellness Visit Subsequent and URI. I last saw the patient on 6/1/2023.     HPI   Patient states that he has been dealing with a cold for the last week. He states that it is just now getting better.     Onset a week ago. Patient admits to cough, congestion w/ phlegm, sinus pressure, post-nasal drip. Patient denies fever, chills, nausea or vomiting. No OTC, did not know what to take with his current medications.     Patient had a negative covid test.    ICD by [facility] 9/20/23    He was in the ER on 11/22 for gout.     Review of Systems  Except positives as noted in the CC & HPI      Constitutional: Denies fevers, chills, night sweats, fatigue, weight changes, change in appetite    Eyes: Denies blurry vision, double vision    ENT: Denies otalgia, trouble hearing, tinnitus, vertigo, rhinorrhea, sore throat    Neck: Denies swelling, masses    Cardiovascular: Denies chest pain, palpitations, edema, orthopnea, syncope    Respiratory: Denies dyspnea, wheezing, postural nocturnal dyspnea    Gastrointestinal: Denies abdominal pain, nausea, vomiting, diarrhea, constipation, melena, hematochezia    Genitourinary: Denies dysuria, hematuria, frequency, urgency    Musculoskeletal: Denies back pain, neck pain, arthralgias, myalgias    Integumentary: Denies skin lesions, rashes, masses    Neurological: Denies dizziness, headaches, confusion, limb weakness, paresthesias, syncope, convulsions    Psychiatric: Denies depression, anxiety, homicidal ideations, suicidal ideations, sleep disturbances    Endocrine: Denies polyphagia, polydipsia, polyuria, weakness, hair thinning, heat intolerance, cold intolerance, weight changes    Heme/Lymph: Denies easy bruising, easy bleeding, swollen glands    Objective   /71   Pulse 80   Temp 36.4 °C (97.6 °F)   Resp 16   Ht 1.905 m (6' 3\")   Wt 109 kg (240 lb)   SpO2 96%   BMI 30.00 kg/m² "     Physical Exam  General Appearance - well-developed, well-nourished, 68 y.o., White male in no acute distress. Patient has a cough in the office.     Skin - warm, pink and dry without rash or concerning lesions.     Mental Status - alert and oriented x 3. Normal mood and affect appropriate to mood.     Ears - TMs shiny and move well with insufflation. Ear canals are clear bilaterally.     Nose - nasal passages are clear bilaterally without bleeding or nasal discharge. Crusty mucus in the left nasal passage.     Mouth - pharynx is pink without exudates. Dentition is normal appearing. Tongue and uvula move in the midline.     Neck - supple without lymphadenopathy. Carotid pulses are normal without bruits. Thyroid is normal in midline without nodules.     Chest - lungs are clear to auscultation without rales, rhonchi or wheezes. An ICD in the left upper chest with a well healed scar.     Heart - regular, rate and rhythm without murmurs, rubs or gallops.    Abdomen - soft, obese, protuberant, nontender, nondistended. No masses, hepatomegaly or splenomegaly is noted. No rebound, rigidity or guarding is noted. Bowel sounds are normoactive.     Extremities - no cyanosis, clubbing or edema. Pedal pulses are 2+ normal at the dorsalis pedis and posterior pulses bilaterally.    Neurological - cranial nerves II through XII are grossly intact. Motor strength 5/5 at all fours.     Assessment/Plan   1. Routine general medical examination at health care facility  1 Year Follow Up [facility] - PCP - Wellness Exam      2. Acute URI  Influenza A, and B PCR    Sars-CoV-2 PCR, Symptomatic    Influenza A, and B PCR    Sars-CoV-2 PCR, Symptomatic      3. Prehypertension  Follow Up [facility] - PCP    CBC and Auto Differential    Comprehensive Metabolic Panel      4. Gastroesophageal reflux disease without esophagitis  Follow [facility] - PCP      5. BPH with obstruction/lower urinary tract  symptoms  Follow [facility] - PCP      6. Fibromyalgia  Follow Up In [facility] - PCP      7. Ischemic cardiomyopathy        8. Hyperglycemia  Hemoglobin A1C      9. CHF (NYHA class III, ACC/AHA stage C) (CMS/HCC)        10. Class 1 obesity due to excess calories without serious comorbidity with body mass index (BMI) of 30.0 to 30.9 in adult        11. Need for hepatitis C screening test  Hepatitis C antibody      12. Lipid screening  Lipid Panel      Patient to continue current medications (with any exceptions as noted) and diet. Follow-up in 6 month(s) otherwise as needed.      Will obtain COVID/flu nasal swab today. Will call patient with results when available.     Patient is to return for fasting CBC, CMP, lipid panel, A1c, Hep C labs at their convenience prior to his next appointment. Fasting is nothing to eat or drink except water or black coffee for 8-12 hours. Will call patient with results when available.     Advised patient that he may try OTC Robitussin DM, Mucinex DM, or Coricidin HBP for cold symptoms.     Patient is to follow up with Dr. Ellington, Dr. Hagan as scheduled.       Scribe Attestation  By signing my name below, IMarianne, López   attest that this documentation has been prepared under the direction and in the presence of Rodney Flores MD.

## 2023-12-04 NOTE — PATIENT INSTRUCTIONS
Patient to continue current medications (with any exceptions as noted) and diet. Follow-up in 6 month(s) otherwise as needed.      Will obtain COVID/flu nasal swab today. Will call patient with results when available.     Patient is to return for fasting CBC, CMP, lipid panel, A1c, Hep C labs at their convenience prior to his next appointment. Fasting is nothing to eat or drink except water or black coffee for 8-12 hours. Will call patient with results when available.     Advised patient that he may try OTC Robitussin DM, Mucinex DM, or Coricidin HBP for cold symptoms.     Patient is to follow up with Dr. Ellington, Dr. Hagan as scheduled.

## 2023-12-04 NOTE — PROGRESS NOTES
"Subjective   Reason for Visit: Alex Alba is an 68 y.o. male here for a Medicare Wellness visit.     Past Medical, Surgical, and Family History reviewed and updated in chart.    Reviewed all medications by prescribing practitioner or clinical pharmacist (such as prescriptions, OTCs, herbal therapies and supplements) and documented in the medical record.    HPI    Patient Care Team:  Rodney Flores MD as PCP - General  Rodney Flores MD as PCP - [facility]     Review of Systems    Objective   Vitals:  /71   Pulse 80   Temp 36.4 °C (97.6 °F)   Resp 16   Ht 1.905 m (6' 3\")   Wt 109 kg (240 lb)   SpO2 96%   BMI 30.00 kg/m²       Physical Exam    Assessment/Plan   Problem List Items Addressed This Visit     BPH with obstruction/lower urinary tract symptoms    Fibromyalgia    Gastroesophageal reflux disease without esophagitis    Prehypertension    Class 1 obesity due to excess calories without serious comorbidity with body mass index (BMI) of 30.0 to 30.9 in adult    Cardiomyopathy (CMS/[facility])    Current Assessment & Plan     Condition is stable. Patient is to continue current medications and regimen. Patient is to follow up with cardiology to monitor his/her condition at least once annually.           CHF (NYHA class III, ACC/AHA stage C) (CMS/[facility])    Current Assessment & Plan     Condition is stable. Patient is to continue current medications and regimen. Patient is to follow up with cardiology to monitor his/her condition at least once annually.          Other Visit Diagnoses     Cough, unspecified type    -  Primary    Need for hepatitis C screening test        Relevant Orders    Hepatitis C antibody    Routine general medical examination at health care facility        Relevant Orders    1 Year Follow Up In [facility] - PCP - Wellness Exam             "

## 2023-12-05 LAB
FLUAV RNA RESP QL NAA+PROBE: NOT DETECTED
FLUBV RNA RESP QL NAA+PROBE: NOT DETECTED
SARS-COV-2 RNA RESP QL NAA+PROBE: NOT DETECTED

## 2023-12-12 ENCOUNTER — HOSPITAL ENCOUNTER (OUTPATIENT)
Dept: CARDIOLOGY | Facility: HOSPITAL | Age: 68
Discharge: HOME | End: 2023-12-12
Payer: MEDICARE

## 2023-12-12 DIAGNOSIS — I50.9 OTHER CONGESTIVE HEART FAILURE (MULTI): ICD-10-CM

## 2023-12-12 DIAGNOSIS — Z95.810 ICD (IMPLANTABLE CARDIOVERTER-DEFIBRILLATOR) IN PLACE: ICD-10-CM

## 2024-01-02 ENCOUNTER — APPOINTMENT (OUTPATIENT)
Dept: CARDIOLOGY | Facility: CLINIC | Age: 69
End: 2024-01-02

## 2024-01-11 ENCOUNTER — OFFICE VISIT (OUTPATIENT)
Dept: CARDIOLOGY | Facility: CLINIC | Age: 69
End: 2024-01-11
Payer: COMMERCIAL

## 2024-01-11 ENCOUNTER — HOSPITAL ENCOUNTER (OUTPATIENT)
Dept: CARDIOLOGY | Facility: HOSPITAL | Age: 69
Discharge: HOME | End: 2024-01-11
Payer: COMMERCIAL

## 2024-01-11 ENCOUNTER — HOSPITAL ENCOUNTER (OUTPATIENT)
Dept: RADIOLOGY | Facility: HOSPITAL | Age: 69
Discharge: HOME | End: 2024-01-11
Payer: COMMERCIAL

## 2024-01-11 VITALS
SYSTOLIC BLOOD PRESSURE: 124 MMHG | WEIGHT: 245 LBS | BODY MASS INDEX: 30.46 KG/M2 | HEIGHT: 75 IN | HEART RATE: 78 BPM | DIASTOLIC BLOOD PRESSURE: 62 MMHG

## 2024-01-11 DIAGNOSIS — Z95.810 PRESENCE OF AUTOMATIC (IMPLANTABLE) CARDIAC DEFIBRILLATOR: ICD-10-CM

## 2024-01-11 DIAGNOSIS — I42.8 LEFT VENTRICULAR NON-COMPACTION CARDIOMYOPATHY (MULTI): ICD-10-CM

## 2024-01-11 DIAGNOSIS — I42.9 CARDIOMYOPATHY, UNSPECIFIED TYPE (MULTI): ICD-10-CM

## 2024-01-11 DIAGNOSIS — I51.89 CARDIAC MASS: ICD-10-CM

## 2024-01-11 DIAGNOSIS — Z95.810 PRESENCE OF AUTOMATIC CARDIOVERTER/DEFIBRILLATOR (AICD): ICD-10-CM

## 2024-01-11 DIAGNOSIS — I44.7 LEFT BUNDLE BRANCH BLOCK (LBBB): ICD-10-CM

## 2024-01-11 DIAGNOSIS — I50.9 CHF (NYHA CLASS III, ACC/AHA STAGE C) (MULTI): ICD-10-CM

## 2024-01-11 DIAGNOSIS — E66.09 CLASS 1 OBESITY DUE TO EXCESS CALORIES WITHOUT SERIOUS COMORBIDITY WITH BODY MASS INDEX (BMI) OF 30.0 TO 30.9 IN ADULT: ICD-10-CM

## 2024-01-11 DIAGNOSIS — I50.9 HEART FAILURE, UNSPECIFIED HF CHRONICITY, UNSPECIFIED HEART FAILURE TYPE (MULTI): ICD-10-CM

## 2024-01-11 DIAGNOSIS — R06.02 EXERTIONAL SHORTNESS OF BREATH: ICD-10-CM

## 2024-01-11 DIAGNOSIS — I42.8 OTHER CARDIOMYOPATHIES (MULTI): ICD-10-CM

## 2024-01-11 DIAGNOSIS — Z95.810 BIVENTRICULAR ICD (IMPLANTABLE CARDIOVERTER-DEFIBRILLATOR) IN PLACE: Primary | ICD-10-CM

## 2024-01-11 PROCEDURE — 1126F AMNT PAIN NOTED NONE PRSNT: CPT | Performed by: NURSE PRACTITIONER

## 2024-01-11 PROCEDURE — 93284 PRGRMG EVAL IMPLANTABLE DFB: CPT | Performed by: INTERNAL MEDICINE

## 2024-01-11 PROCEDURE — 71046 X-RAY EXAM CHEST 2 VIEWS: CPT | Performed by: RADIOLOGY

## 2024-01-11 PROCEDURE — 1159F MED LIST DOCD IN RCRD: CPT | Performed by: NURSE PRACTITIONER

## 2024-01-11 PROCEDURE — 93284 PRGRMG EVAL IMPLANTABLE DFB: CPT

## 2024-01-11 PROCEDURE — 93290 INTERROG DEV EVAL ICPMS IP: CPT | Performed by: INTERNAL MEDICINE

## 2024-01-11 PROCEDURE — 99214 OFFICE O/P EST MOD 30 MIN: CPT | Performed by: NURSE PRACTITIONER

## 2024-01-11 PROCEDURE — 1160F RVW MEDS BY RX/DR IN RCRD: CPT | Performed by: NURSE PRACTITIONER

## 2024-01-11 PROCEDURE — 3008F BODY MASS INDEX DOCD: CPT | Performed by: NURSE PRACTITIONER

## 2024-01-11 PROCEDURE — 1036F TOBACCO NON-USER: CPT | Performed by: NURSE PRACTITIONER

## 2024-01-11 PROCEDURE — 71046 X-RAY EXAM CHEST 2 VIEWS: CPT

## 2024-01-11 NOTE — PROGRESS NOTES
"CARDIOLOGY OFFICE VISIT      CHIEF COMPLAINT  Chief Complaint   Patient presents with    Device Check     Complaint: \"I am doing fine.\"  HISTORY OF PRESENT ILLNESS  HPI  History: The patient is a 68-year-old  male who is followed for nonischemic cardiomyopathy with a left ventricular ejection fraction of 18% per Lexiscan stress test dated February 6, 2023 and 15 to 20% per 2D echocardiogram dated May 30, 2023, [place] Heart Association class II-III, stage B heart failure.  Left heart catheterization dated February 6, 2023 revealing less than 10% left main with luminal irregularities of the LAD, circumflex, and RCA with recommendation for medical management.  He underwent placement of an AV biventricular ICD on September 20, 2023 and presents to the office today for follow-up evaluation.  He states that he has been doing well since the implant.  He denies chest pain, palpitations, dizziness, lightheadedness, shortness of breath, abdominal distention, or lower extremity edema.  Past Medical History  Past Medical History:   Diagnosis Date    Acute maxillary sinusitis, unspecified 07/27/2020    Subacute maxillary sinusitis    Cellulitis of right finger 06/03/2019    Paronychia of finger of right hand    Encounter for immunization     Encounter for immunization    Encounter for screening for lipoid disorders 12/03/2019    Screening, lipid    Encounter for screening for malignant neoplasm of colon 05/13/2021    Encounter for screening for colorectal malignant neoplasm    Encounter for screening for malignant neoplasm of prostate 12/03/2019    Screening PSA (prostate specific antigen)    Impingement syndrome of left shoulder     Impingement syndrome of left shoulder    Personal history of other diseases of the musculoskeletal system and connective tissue     History of arthritis    Personal history of other drug therapy 05/28/2020    History of pneumococcal vaccination    Personal history of other specified " conditions 08/29/2019    History of fatigue       Social History  Social History     Tobacco Use    Smoking status: Never     Passive exposure: Never    Smokeless tobacco: Never   Substance Use Topics    Alcohol use: Never    Drug use: Never       Family History     Family History   Problem Relation Name Age of Onset    Diabetes type II Mother      Hypertension Mother      Diabetes Mother      Other (CEREBRALVASCULAR ACCIDENT) Mother      Hypertension Father      Other (URINARY BLADDER CA) Father          Allergies:  No Known Allergies     Outpatient Medications:  Current Outpatient Medications   Medication Instructions    apixaban (Eliquis) 5 mg tablet TAKE 1 TABLET BY MOUTH TWO TIMES A DAY    Entresto 24-26 mg tablet take 1 tablet by mouth twice a day    Jardiance 10 mg, oral, Daily    metoprolol succinate XL (Toprol-XL) 25 mg 24 hr tablet take 1 tablet by mouth every day    spironolactone (Aldactone) 25 mg tablet TAKE 0.5 TABLET Daily    tamsulosin (Flomax) 0.4 mg 24 hr capsule take 1 capsule by mouth twice a day          REVIEW OF SYSTEMS  Review of Systems   All other systems reviewed and are negative.        VITALS  Vitals:    01/11/24 0937   BP: 124/62   Pulse: 78       PHYSICAL EXAM  Vitals and nursing note reviewed.   Constitutional:       Appearance: Normal appearance.   HENT:      Head: Normocephalic.   Neck:      Vascular: No JVD.   Cardiovascular:      Rate and Rhythm: Normal rate and regular rhythm.      Pulses: Normal pulses.      Heart sounds: Normal heart sounds.   Pulmonary:      Effort: Pulmonary effort is normal.      Breath sounds: Normal breath sounds.   Abdominal:      General: Bowel sounds are normal.      Palpations: Abdomen is soft.   Musculoskeletal:         General: Normal range of motion.      Cervical back: Normal range of motion.   Skin:     General: Skin is warm and dry.  Left subclavian ICD pocket is well-healed without redness swelling or drainage.  Neurological:      General: No  focal deficit present.      Mental Status: She is alert and oriented to person, place, and time.      Motor: Motor function is intact.   Psychiatric:         Attention and Perception: Attention and perception normal.         Mood and Affect: Mood and affect normal.         Speech: Speech normal.         Behavior: Behavior normal. Behavior is cooperative.         Thought Content: Thought content normal.         Cognition and Memory: Cognition and memory normal.     Labs and testing: Twelve-lead EKG reveals sinus rhythm with ventricular pacing at 78 bpm.  QRS durations 134 ms,  ms, QTc 433 ms.  ICD interrogation dated January 11, 2024 reveals atrial pacing 21% and ventricular pacing 96%.  2 nonsustained VT detections were noted with ventricular rate up to 179 bpm.  The most recent event was on December 11, 2023 with a heart rate of 167 bpm lasting 8 seconds in duration.  The previous event was not on October 9, 2023 at 12:45 PM lasting 6 seconds in duration at a rate of 179 bpm.  No additional arrhythmic events were noted.  Good sensing and capture thresholds are noted.  Estimated battery longevity is 7 years.  PA and lateral chest x-ray reveals good right atrial, right ventricular, and coronary sinus lead placement.  No active lung disease is noted.  The left hemidiaphragm is elevated.      ASSESSMENT AND PLAN    Clinical impressions:  1.  Nonischemic cardiomyopathy with a left ventricular ejection fraction of 15 to 20% per 2D echocardiogram dated May 30, 2023, New [place] Heart Association class II, stage C heart failure.  2.  AV biventricular ICD implant ([employer] [facility] HF CRT-D SD) on September 20, 2023 for the treatment of refractory heart failure and primary prevention of sudden cardiac death.  3.  History of LV thrombus anticoagulated with Eliquis.  4.  Valvular heart disease consisting of mild MR and TR per 2D echocardiogram dated May 30, 2023.  5.  Mild dilation of the ascending aorta per 2D  echocardiogram dated May 30, 2023.  6.  Pulmonary hypertension with right ventricular systolic pressure 31.5 mmHg per 2D echo.  7.  Left heart catheterization dated February 6, 2023 revealing less than 10% left main and luminal irregularities of the LAD, circumflex, and right coronary arteries.  8.  Benign prostatic hypertrophy.  9.  Hypertension, controlled with a blood pressure today 124/62.  10.  Class I obesity with a BMI 30.62.    Recommendations:  1.  Continue current medications as prescribed.  2.  Discussed routine device follow up is scheduled every 3-4 months.  Inclinic checks alternate with remote (home) checks.  Inclinic checks will be scheduled prior to the office visit with Electrophysiology.  The patient will undergo a remote device interrogation on April 15, 2024 as scheduled.  He will undergo an in clinic check in 6 months prior to the office visit Dr. Lawler.  3.  Follow-up in office with Dr. Lawler in 6 months or sooner if needed.  4.  Instructed patient to always carry the device card in their wallet.  [place]  Do not carry cell phone in the shirt pocket on the side of the implant.  Utilize the ear on the opposite side of the device.  Refrain from environments with electromagnetic interference (LARS).  5.  Anatomy and physiology related to heart failure and device function was reviewed in detail with the patient.  All questions were answered.  6.  Continue lifestyle modifications.  7.  Obtain 2D echocardiogram on April 3, 2024 at 10:15 AM as scheduled.  8.  Follow-up in office with Dr. Ellington on April 10, 2024 at 9:30 AM as scheduled or sooner if needed.    Evaluation and note by Lisset Hernandez CNP  **Please excuse any errors in grammar or translation related to this dictation.  Voice recognition software was utilized to prepare this document.**

## 2024-01-19 DIAGNOSIS — I51.3 LV (LEFT VENTRICULAR) MURAL THROMBUS: ICD-10-CM

## 2024-01-19 DIAGNOSIS — I50.9 CHF (NYHA CLASS III, ACC/AHA STAGE C) (MULTI): ICD-10-CM

## 2024-01-21 RX ORDER — METOPROLOL SUCCINATE 25 MG/1
25 TABLET, EXTENDED RELEASE ORAL DAILY
Qty: 90 TABLET | Refills: 3 | Status: SHIPPED | OUTPATIENT
Start: 2024-01-21

## 2024-01-21 RX ORDER — SACUBITRIL AND VALSARTAN 24; 26 MG/1; MG/1
1 TABLET, FILM COATED ORAL 2 TIMES DAILY
Qty: 90 TABLET | Refills: 3 | Status: SHIPPED | OUTPATIENT
Start: 2024-01-21 | End: 2024-05-24

## 2024-02-07 ENCOUNTER — HOSPITAL ENCOUNTER (OUTPATIENT)
Dept: CARDIOLOGY | Facility: HOSPITAL | Age: 69
Discharge: HOME | End: 2024-02-07
Payer: COMMERCIAL

## 2024-02-07 DIAGNOSIS — Z95.810 ICD (IMPLANTABLE CARDIOVERTER-DEFIBRILLATOR) IN PLACE: ICD-10-CM

## 2024-02-07 DIAGNOSIS — I50.9 OTHER CONGESTIVE HEART FAILURE (MULTI): ICD-10-CM

## 2024-03-25 ENCOUNTER — LAB (OUTPATIENT)
Dept: LAB | Facility: LAB | Age: 69
End: 2024-03-25
Payer: COMMERCIAL

## 2024-04-03 ENCOUNTER — HOSPITAL ENCOUNTER (OUTPATIENT)
Dept: CARDIOLOGY | Facility: CLINIC | Age: 69
Discharge: HOME | End: 2024-04-03
Payer: COMMERCIAL

## 2024-04-03 VITALS — HEIGHT: 75 IN | BODY MASS INDEX: 30.46 KG/M2 | WEIGHT: 245 LBS

## 2024-04-03 DIAGNOSIS — I42.8 LEFT VENTRICULAR NON-COMPACTION CARDIOMYOPATHY (MULTI): ICD-10-CM

## 2024-04-03 DIAGNOSIS — I50.9 CHF (NYHA CLASS III, ACC/AHA STAGE C) (MULTI): ICD-10-CM

## 2024-04-03 PROCEDURE — 93306 TTE W/DOPPLER COMPLETE: CPT

## 2024-04-03 PROCEDURE — 93306 TTE W/DOPPLER COMPLETE: CPT | Performed by: INTERNAL MEDICINE

## 2024-04-03 PROCEDURE — 2500000004 HC RX 250 GENERAL PHARMACY W/ HCPCS (ALT 636 FOR OP/ED): Performed by: INTERNAL MEDICINE

## 2024-04-03 RX ADMIN — HUMAN ALBUMIN MICROSPHERES AND PERFLUTREN 0.5 ML: 10; .22 INJECTION, SOLUTION INTRAVENOUS at 11:11

## 2024-04-04 LAB
AORTIC VALVE MEAN GRADIENT: 4 MMHG
AORTIC VALVE PEAK VELOCITY: 1.31 M/S
AV PEAK GRADIENT: 6.9 MMHG
AVA (PEAK VEL): 3.52 CM2
AVA (VTI): 3.56 CM2
EJECTION FRACTION APICAL 4 CHAMBER: 40.6
LEFT VENTRICLE INTERNAL DIMENSION DIASTOLE: 3.7 CM (ref 3.5–6)
LEFT VENTRICULAR OUTFLOW TRACT DIAMETER: 2.4 CM
LV EJECTION FRACTION BIPLANE: 42 %
MITRAL VALVE E/A RATIO: 1.09
MITRAL VALVE E/E' RATIO: 7
RIGHT VENTRICLE PEAK SYSTOLIC PRESSURE: 22.9 MMHG

## 2024-04-10 ENCOUNTER — APPOINTMENT (OUTPATIENT)
Dept: CARDIOLOGY | Facility: CLINIC | Age: 69
End: 2024-04-10

## 2024-04-11 ENCOUNTER — TELEPHONE (OUTPATIENT)
Dept: CARDIOLOGY | Facility: CLINIC | Age: 69
End: 2024-04-11

## 2024-04-11 ENCOUNTER — TELEPHONE (OUTPATIENT)
Dept: CARDIOLOGY | Facility: CLINIC | Age: 69
End: 2024-04-11
Payer: COMMERCIAL

## 2024-04-11 NOTE — TELEPHONE ENCOUNTER
----- Message from Dale Ellington MD sent at 4/7/2024 11:11 PM EDT -----  Echo shows improved LV function c/t 5/23

## 2024-04-15 ENCOUNTER — HOSPITAL ENCOUNTER (OUTPATIENT)
Dept: CARDIOLOGY | Facility: HOSPITAL | Age: 69
Discharge: HOME | End: 2024-04-15
Payer: COMMERCIAL

## 2024-04-15 DIAGNOSIS — I50.9 OTHER CONGESTIVE HEART FAILURE (MULTI): ICD-10-CM

## 2024-04-15 DIAGNOSIS — Z95.810 ICD (IMPLANTABLE CARDIOVERTER-DEFIBRILLATOR) IN PLACE: ICD-10-CM

## 2024-04-15 PROCEDURE — 93295 DEV INTERROG REMOTE 1/2/MLT: CPT | Performed by: INTERNAL MEDICINE

## 2024-04-15 PROCEDURE — 93296 REM INTERROG EVL PM/IDS: CPT

## 2024-04-23 ENCOUNTER — OFFICE VISIT (OUTPATIENT)
Dept: CARDIOLOGY | Facility: CLINIC | Age: 69
End: 2024-04-23
Payer: COMMERCIAL

## 2024-04-23 VITALS
BODY MASS INDEX: 30.82 KG/M2 | SYSTOLIC BLOOD PRESSURE: 128 MMHG | HEIGHT: 75 IN | DIASTOLIC BLOOD PRESSURE: 66 MMHG | HEART RATE: 74 BPM | WEIGHT: 247.9 LBS

## 2024-04-23 DIAGNOSIS — Z78.9 NEVER SMOKED TOBACCO: ICD-10-CM

## 2024-04-23 DIAGNOSIS — E66.09 CLASS 1 OBESITY DUE TO EXCESS CALORIES WITHOUT SERIOUS COMORBIDITY WITH BODY MASS INDEX (BMI) OF 30.0 TO 30.9 IN ADULT: ICD-10-CM

## 2024-04-23 DIAGNOSIS — I50.9 CHF (NYHA CLASS III, ACC/AHA STAGE C) (MULTI): ICD-10-CM

## 2024-04-23 DIAGNOSIS — I44.7 LEFT BUNDLE BRANCH BLOCK (LBBB): ICD-10-CM

## 2024-04-23 DIAGNOSIS — I42.8 LEFT VENTRICULAR NON-COMPACTION CARDIOMYOPATHY (MULTI): ICD-10-CM

## 2024-04-23 PROCEDURE — 3008F BODY MASS INDEX DOCD: CPT | Performed by: INTERNAL MEDICINE

## 2024-04-23 PROCEDURE — 1159F MED LIST DOCD IN RCRD: CPT | Performed by: INTERNAL MEDICINE

## 2024-04-23 PROCEDURE — 99214 OFFICE O/P EST MOD 30 MIN: CPT | Performed by: INTERNAL MEDICINE

## 2024-04-23 PROCEDURE — 1160F RVW MEDS BY RX/DR IN RCRD: CPT | Performed by: INTERNAL MEDICINE

## 2024-04-23 PROCEDURE — 1036F TOBACCO NON-USER: CPT | Performed by: INTERNAL MEDICINE

## 2024-04-23 RX ORDER — SPIRONOLACTONE 25 MG/1
25 TABLET ORAL
Qty: 1 TABLET | Refills: 0 | OUTPATIENT
Start: 2024-04-23 | End: 2024-04-23 | Stop reason: SDUPTHER

## 2024-04-23 ASSESSMENT — ENCOUNTER SYMPTOMS
NEUROLOGICAL NEGATIVE: 1
RESPIRATORY NEGATIVE: 1
CARDIOVASCULAR NEGATIVE: 1
CONSTITUTIONAL NEGATIVE: 1

## 2024-04-23 NOTE — PROGRESS NOTES
CARDIOLOGY OFFICE VISIT      CHIEF COMPLAINT  No chief complaint on file.       HISTORY OF PRESENT ILLNESS  Alex Alba is a 69 y.o. year old male patient with nonischemic cardiomyopathy and cardiac catheterization that showed normal coronaries in February 2023.  Initial EF was 15 to 20% with a dilated left ventricle dimensions 6.4 cm, but cardiac MRI showed normal LV cavity size with EF of 22%.  He is s/p BiV AICD implantation and there was a question of possible noncompaction.  He has continued to do well and BNP was 130 which came down from 279.  He had a repeat echocardiogram in April 2024 that shows normal LV size with EF of 45 to 50%.  There were no gross valvular abnormalities.  He complains of excessive urination with his current dose of spironolactone but otherwise is doing well denies any chest pain or significant shortness of breath with his day-to-day activities.    ASSESSMENT AND PLAN  1.  Dilated nonischemic cardiomyopathy: With initial severe LV dysfunction which has markedly improved on repeat echocardiogram as noted above.  We will continue with the current optimal GDMT will reduce spironolactone to 12.5 mg every other day.  2.  Hypertension: Blood pressure is well-controlled on current medications which we will continue.  3 dyslipidemia: We will get lipids and LFTs prior to his next follow-up.  Problem List Items Addressed This Visit       Left bundle branch block (LBBB)    Class 1 obesity due to excess calories without serious comorbidity with body mass index (BMI) of 30.0 to 30.9 in adult    Left ventricular non-compaction cardiomyopathy (Multi)    CHF (NYHA class III, ACC/AHA stage C) (Multi)    BMI 30.0-30.9,adult    Never smoked tobacco       Recent Cardiovascular Testing:    Echo-  Stress-  Cath-  Carotid Ultrasound-    Past Medical History  Past Medical History:   Diagnosis Date    Acute maxillary sinusitis, unspecified 07/27/2020    Subacute maxillary sinusitis    Cellulitis of  right finger 06/03/2019    Paronychia of finger of right hand    Encounter for immunization     Encounter for immunization    Encounter for screening for lipoid disorders 12/03/2019    Screening, lipid    Encounter for screening for malignant neoplasm of colon 05/13/2021    Encounter for screening for colorectal malignant neoplasm    Encounter for screening for malignant neoplasm of prostate 12/03/2019    Screening PSA (prostate specific antigen)    Impingement syndrome of left shoulder     Impingement syndrome of left shoulder    Personal history of other diseases of the musculoskeletal system and connective tissue     History of arthritis    Personal history of other drug therapy 05/28/2020    History of pneumococcal vaccination    Personal history of other specified conditions 08/29/2019    History of fatigue       Social History  Social History     Tobacco Use    Smoking status: Never     Passive exposure: Never    Smokeless tobacco: Never   Substance Use Topics    Alcohol use: Never    Drug use: Never       Family History     Family History   Problem Relation Name Age of Onset    Diabetes type II Mother      Hypertension Mother      Diabetes Mother      Other (CEREBRALVASCULAR ACCIDENT) Mother      Hypertension Father      Other (URINARY BLADDER CA) Father          Allergies:  No Known Allergies     Outpatient Medications:  Current Outpatient Medications   Medication Instructions    apixaban (Eliquis) 5 mg tablet TAKE 1 TABLET BY MOUTH TWO TIMES A DAY    empagliflozin (JARDIANCE) 10 mg, oral, Daily    metoprolol succinate XL (TOPROL-XL) 25 mg, oral, Daily, Do not crush or chew.    sacubitriL-valsartan (Entresto) 24-26 mg tablet 1 tablet, oral, 2 times daily    spironolactone (Aldactone) 25 mg tablet TAKE 0.5 TABLET Daily    tamsulosin (Flomax) 0.4 mg 24 hr capsule take 1 capsule by mouth twice a day        Recent Lab Results:    CBC:   Lab Results   Component Value Date    WBC CANCELED 09/20/2023    RBC  "CANCELED 09/20/2023    HGB CANCELED 09/20/2023    HCT CANCELED 09/20/2023    PLT CANCELED 09/20/2023        CMP:    Lab Results   Component Value Date    NA CANCELED 09/20/2023    K CANCELED 09/20/2023    CL CANCELED 09/20/2023    CO2 CANCELED 09/20/2023    BUN CANCELED 09/20/2023    CREATININE CANCELED 09/20/2023    GLUCOSE CANCELED 09/20/2023    CALCIUM CANCELED 09/20/2023       Magnesium:    No results found for: \"MG\"    Lipid Profile:    Lab Results   Component Value Date    TRIG 107 06/01/2023    HDL 45.3 06/01/2023       TSH:    Lab Results   Component Value Date    TSH 2.95 06/29/2023       BNP:   Lab Results   Component Value Date     (H) 06/29/2023        PT/INR:    Lab Results   Component Value Date    PROTIME CANCELED 09/20/2023    INR CANCELED 09/20/2023       HgBA1c:    Lab Results   Component Value Date    HGBA1C 5.6 06/01/2023       BMP:  Lab Results   Component Value Date    NA CANCELED 09/20/2023     09/14/2023     07/31/2023    K CANCELED 09/20/2023    K 4.5 09/14/2023    K 4.5 07/31/2023    CL CANCELED 09/20/2023     (H) 09/14/2023     (H) 07/31/2023    CO2 CANCELED 09/20/2023    CO2 25 09/14/2023    CO2 28 07/31/2023    BUN CANCELED 09/20/2023    BUN 22 09/14/2023    BUN 22 07/31/2023    CREATININE CANCELED 09/20/2023    CREATININE 1.14 09/14/2023    CREATININE 1.23 07/31/2023       CBC:  Lab Results   Component Value Date    WBC CANCELED 09/20/2023    WBC 6.1 09/14/2023    WBC 7.2 06/01/2023    RBC CANCELED 09/20/2023    RBC 4.89 09/14/2023    RBC 5.33 06/01/2023    HGB CANCELED 09/20/2023    HGB 15.4 09/14/2023    HGB 16.7 06/01/2023    HCT CANCELED 09/20/2023    HCT 47.5 09/14/2023    HCT 52.0 06/01/2023    MCV CANCELED 09/20/2023    MCV 97 09/14/2023    MCV 98 06/01/2023    MCHC CANCELED 09/20/2023    MCHC 32.4 09/14/2023    MCHC 32.1 06/01/2023    RDW CANCELED 09/20/2023    RDW 13.2 09/14/2023    RDW 13.1 06/01/2023    PLT CANCELED 09/20/2023     " "09/14/2023     06/01/2023       Cardiac Enzymes:    No results found for: \"TROPHS\"    Hepatic Function Panel:    Lab Results   Component Value Date    ALKPHOS 63 06/01/2023    ALT 10 06/01/2023    AST 12 06/01/2023    PROT 6.5 06/01/2023    BILITOT 0.9 06/01/2023         REVIEW OF SYSTEMS  Review of Systems   Constitutional: Negative.   Cardiovascular: Negative.    Respiratory: Negative.     Neurological: Negative.    All other systems reviewed and are negative.      VITALS  There were no vitals filed for this visit.  Wt Readings from Last 4 Encounters:   04/03/24 111 kg (245 lb)   01/11/24 111 kg (245 lb)   12/04/23 109 kg (240 lb)   11/22/23 108 kg (238 lb)       PHYSICAL EXAM  Constitutional:       Appearance: Healthy appearance.   Eyes:      Pupils: Pupils are equal, round, and reactive to light.   Pulmonary:      Effort: Pulmonary effort is normal.      Breath sounds: Normal breath sounds.   Cardiovascular:      PMI at left midclavicular line. Normal rate. Regular rhythm.      Murmurs: There is no murmur.      No gallop.  No click. No rub.   Pulses:     Intact distal pulses.   Musculoskeletal: Normal range of motion.      Cervical back: Normal range of motion. Skin:     General: Skin is warm and dry.   Neurological:      General: No focal deficit present.      Mental Status: Alert and oriented to person, place and time.             "

## 2024-04-24 RX ORDER — SPIRONOLACTONE 25 MG/1
12.5 TABLET ORAL
Qty: 1 TABLET | Refills: 0 | OUTPATIENT
Start: 2024-04-24

## 2024-05-09 DIAGNOSIS — R97.20 ELEVATED PSA: ICD-10-CM

## 2024-05-20 ENCOUNTER — LAB (OUTPATIENT)
Dept: LAB | Facility: LAB | Age: 69
End: 2024-05-20
Payer: COMMERCIAL

## 2024-05-20 DIAGNOSIS — R03.0 PREHYPERTENSION: ICD-10-CM

## 2024-05-20 DIAGNOSIS — Z13.220 LIPID SCREENING: ICD-10-CM

## 2024-05-20 DIAGNOSIS — R73.9 HYPERGLYCEMIA: ICD-10-CM

## 2024-05-20 DIAGNOSIS — Z11.59 NEED FOR HEPATITIS C SCREENING TEST: ICD-10-CM

## 2024-05-20 DIAGNOSIS — I50.9 CHF (NYHA CLASS III, ACC/AHA STAGE C) (MULTI): ICD-10-CM

## 2024-05-20 LAB
ALBUMIN SERPL BCP-MCNC: 3.9 G/DL (ref 3.4–5)
ALP SERPL-CCNC: 60 U/L (ref 33–136)
ALT SERPL W P-5'-P-CCNC: 10 U/L (ref 10–52)
ANION GAP SERPL CALC-SCNC: 13 MMOL/L (ref 10–20)
AST SERPL W P-5'-P-CCNC: 11 U/L (ref 9–39)
BASOPHILS # BLD AUTO: 0.04 X10*3/UL (ref 0–0.1)
BASOPHILS NFR BLD AUTO: 0.6 %
BILIRUB SERPL-MCNC: 0.9 MG/DL (ref 0–1.2)
BNP SERPL-MCNC: 64 PG/ML (ref 0–99)
BUN SERPL-MCNC: 22 MG/DL (ref 6–23)
CALCIUM SERPL-MCNC: 9 MG/DL (ref 8.6–10.3)
CHLORIDE SERPL-SCNC: 108 MMOL/L (ref 98–107)
CHOLEST SERPL-MCNC: 192 MG/DL (ref 0–199)
CHOLESTEROL/HDL RATIO: 4.3
CO2 SERPL-SCNC: 27 MMOL/L (ref 21–32)
CREAT SERPL-MCNC: 1.2 MG/DL (ref 0.5–1.3)
EGFRCR SERPLBLD CKD-EPI 2021: 65 ML/MIN/1.73M*2
EOSINOPHIL # BLD AUTO: 0.11 X10*3/UL (ref 0–0.4)
EOSINOPHIL NFR BLD AUTO: 1.6 %
ERYTHROCYTE [DISTWIDTH] IN BLOOD BY AUTOMATED COUNT: 13.2 % (ref 11.5–14.5)
EST. AVERAGE GLUCOSE BLD GHB EST-MCNC: 120 MG/DL
GLUCOSE SERPL-MCNC: 99 MG/DL (ref 74–99)
HBA1C MFR BLD: 5.8 %
HCT VFR BLD AUTO: 48.4 % (ref 41–52)
HCV AB SER QL: NONREACTIVE
HDLC SERPL-MCNC: 44.5 MG/DL
HGB BLD-MCNC: 15.9 G/DL (ref 13.5–17.5)
IMM GRANULOCYTES # BLD AUTO: 0.03 X10*3/UL (ref 0–0.5)
IMM GRANULOCYTES NFR BLD AUTO: 0.4 % (ref 0–0.9)
LDLC SERPL CALC-MCNC: 121 MG/DL
LYMPHOCYTES # BLD AUTO: 1.32 X10*3/UL (ref 0.8–3)
LYMPHOCYTES NFR BLD AUTO: 19.4 %
MCH RBC QN AUTO: 31.8 PG (ref 26–34)
MCHC RBC AUTO-ENTMCNC: 32.9 G/DL (ref 32–36)
MCV RBC AUTO: 97 FL (ref 80–100)
MONOCYTES # BLD AUTO: 0.79 X10*3/UL (ref 0.05–0.8)
MONOCYTES NFR BLD AUTO: 11.6 %
NEUTROPHILS # BLD AUTO: 4.51 X10*3/UL (ref 1.6–5.5)
NEUTROPHILS NFR BLD AUTO: 66.4 %
NON HDL CHOLESTEROL: 148 MG/DL (ref 0–149)
NRBC BLD-RTO: 0 /100 WBCS (ref 0–0)
PLATELET # BLD AUTO: 198 X10*3/UL (ref 150–450)
POTASSIUM SERPL-SCNC: 4.2 MMOL/L (ref 3.5–5.3)
PROT SERPL-MCNC: 6.5 G/DL (ref 6.4–8.2)
RBC # BLD AUTO: 5 X10*6/UL (ref 4.5–5.9)
SODIUM SERPL-SCNC: 144 MMOL/L (ref 136–145)
TRIGL SERPL-MCNC: 133 MG/DL (ref 0–149)
VLDL: 27 MG/DL (ref 0–40)
WBC # BLD AUTO: 6.8 X10*3/UL (ref 4.4–11.3)

## 2024-05-20 PROCEDURE — 80061 LIPID PANEL: CPT

## 2024-05-20 PROCEDURE — 80053 COMPREHEN METABOLIC PANEL: CPT

## 2024-05-20 PROCEDURE — 85025 COMPLETE CBC W/AUTO DIFF WBC: CPT

## 2024-05-20 PROCEDURE — 83036 HEMOGLOBIN GLYCOSYLATED A1C: CPT

## 2024-05-20 PROCEDURE — 36415 COLL VENOUS BLD VENIPUNCTURE: CPT

## 2024-05-20 PROCEDURE — 83880 ASSAY OF NATRIURETIC PEPTIDE: CPT

## 2024-05-20 PROCEDURE — 86803 HEPATITIS C AB TEST: CPT

## 2024-05-24 DIAGNOSIS — I50.9 CHF (NYHA CLASS III, ACC/AHA STAGE C) (MULTI): ICD-10-CM

## 2024-05-24 RX ORDER — SACUBITRIL AND VALSARTAN 24; 26 MG/1; MG/1
1 TABLET, FILM COATED ORAL 2 TIMES DAILY
Qty: 180 TABLET | Refills: 3 | Status: SHIPPED | OUTPATIENT
Start: 2024-05-24

## 2024-05-24 NOTE — TELEPHONE ENCOUNTER
Received request for prescription refills for patient.   Patient follows with Dr. Chandana MD     Request is for Entresto  Is patient currently on medication, YES     Last OV 4/23/24  Next OV 10/21/24    Pended for signing and sent to provider

## 2024-06-03 ENCOUNTER — OFFICE VISIT (OUTPATIENT)
Dept: PRIMARY CARE | Facility: CLINIC | Age: 69
End: 2024-06-03
Payer: COMMERCIAL

## 2024-06-03 VITALS
TEMPERATURE: 98 F | SYSTOLIC BLOOD PRESSURE: 126 MMHG | HEIGHT: 75 IN | HEART RATE: 80 BPM | WEIGHT: 249.2 LBS | DIASTOLIC BLOOD PRESSURE: 64 MMHG | OXYGEN SATURATION: 94 % | BODY MASS INDEX: 30.99 KG/M2

## 2024-06-03 DIAGNOSIS — I42.0 DILATED CARDIOMYOPATHY (MULTI): Primary | ICD-10-CM

## 2024-06-03 DIAGNOSIS — R03.0 PREHYPERTENSION: ICD-10-CM

## 2024-06-03 DIAGNOSIS — I50.9 CHF (NYHA CLASS III, ACC/AHA STAGE C) (MULTI): ICD-10-CM

## 2024-06-03 DIAGNOSIS — R97.20 ELEVATED PSA: ICD-10-CM

## 2024-06-03 PROCEDURE — 1036F TOBACCO NON-USER: CPT | Performed by: FAMILY MEDICINE

## 2024-06-03 PROCEDURE — 3008F BODY MASS INDEX DOCD: CPT | Performed by: FAMILY MEDICINE

## 2024-06-03 PROCEDURE — 99214 OFFICE O/P EST MOD 30 MIN: CPT | Performed by: FAMILY MEDICINE

## 2024-06-03 PROCEDURE — 1159F MED LIST DOCD IN RCRD: CPT | Performed by: FAMILY MEDICINE

## 2024-06-03 NOTE — PROGRESS NOTES
Chief Complaint   Patient presents with    Follow-up     Prehypertension; Gastroesophageal reflux disease without esophagitis; BPH with obstruction/lower urinary tract symptoms; Fibromyalgia       HPI: Alex Alba is a 69 y.o. male presenting for follow-up of Follow-up (Prehypertension; Gastroesophageal reflux disease without esophagitis; BPH with obstruction/lower urinary tract symptoms; Fibromyalgia)   I last saw the patient 12/4/2023.     Pt is wondering if he pulled a muscle in his right knee. Onset 3 weeks ago, pt states he woke up and the  pain was there. Pain is slightly there, pt states it has improved greatly since onset. He has done a lot of walking and standing recently on uneven ground. No previous injury known.     ICD implant on 10/10/23 by Dr. Lawler. They are very happy with the results from a cardiology standpoint. BNP has returned to normal.     ROS    Except positives as noted in the CC & HPI   Constitutional: Denies fevers, chills, night sweats, fatigue, weight changes, change in appetite   Eyes: Denies blurry vision, double vision   ENT: Denies otalgia, trouble hearing, tinnitus, vertigo, nasal congestion, rhinorrhea, sore throat   Neck: Denies swelling, masses   Cardiovascular: Denies chest pain, palpitations, edema, orthopnea, syncope   Respiratory: Denies dyspnea, cough, wheezing, postural nocturnal dyspnea   Gastrointestinal: Denies abdominal pain, nausea, vomiting, diarrhea, constipation, melena, hematochezia   Genitourinary: Denies dysuria, hematuria, frequency, urgency   Musculoskeletal: Denies back pain, neck pain, arthralgias, myalgias   Integumentary: Denies skin lesions, rashes, masses   Neurological: Denies dizziness, headaches, confusion, limb weakness, paresthesias, syncope, convulsions   Psychiatric: Denies depression, anxiety, homicidal ideations, suicidal ideations, sleep disturbances   Endocrine: Denies polyphagia, polydipsia, polyuria, weakness, hair thinning, heat  "intolerance, cold intolerance, weight changes   Heme/Lymph: Denies easy bruising, easy bleeding, swollen glands     Vitals:    06/03/24 0955 06/03/24 1028   BP: 110/56 126/64   BP Location: Right arm Right arm   Patient Position: Sitting Sitting   BP Cuff Size:  Adult   Pulse: 80    Temp: 36.7 °C (98 °F)    TempSrc: Temporal    SpO2: 94%    Weight: 113 kg (249 lb 3.2 oz)    Height: 1.905 m (6' 3\")        PHYSICAL EXAM    GENERAL APPEARANCE: well-developed, well-nourished, 69 y.o. male in no acute distress.  SKIN: warm, pink and dry without rash or concerning lesions.  MENTAL STATUS: alert and oriented × 3. Normal mood and affect appropriate to mood.  NECK: supple without lymphadenopathy. Carotid pulses are normal without bruits. Thyroid - is normal in midline without nodules.  CHEST: lungs are clear to auscultation without rales, rhonchi or wheezes.  Left upper chest reveals a well-healed AICD site.  Nontender to palpation.  HEART: regular, rate and rhythm without murmurs, rubs or gallops.  ABDOMEN: soft, flat, nondistended. No masses, hepatomegaly or splenomegaly is noted.  EXTREMITIES: no cyanosis, clubbing or edema. Pedal pulses are 2+ normal at the dorsalis pedis and posterior tibial pulses bilaterally.  KNEE - Normal visual inspection, no erythema, warmth, or effusion. Normal knee alignment. Range of motion to flexion to 160 ° and extension to 0° of right knee. Range of motion to flexion to 160 ° and extension to 0° of left knee. Negative Lachman, anterior drawer test, valgus and varus stress testing. Obdulia's testing, patellar apprehension. No tenderness to palpation of MCL, LCL, medial joint line, lateral joint line, patellar tendon, quadriceps tendon, or pes bursa. Sensation intact. Muscle strength +5/5. Gait normal. Normal range of motion of hips.  NEUROLOGICAL: cranial nerves II through XII are grossly intact. Motor strength 5/5 at all fours.       Below is the patient's most recent value for Albumin, " ALT, AST, BUN, Calcium, Chloride, Cholesterol, CO2, Creatinine, GFR, Glucose, HDL, Hematocrit, Hemoglobin, Hemoglobin A1C, LDL, Magnesium, Phosphorus, Platelets, Potassium, PSA, Sodium, Triglycerides, and WBC.   Lab Results   Component Value Date    ALBUMIN 3.9 05/20/2024    ALT 10 05/20/2024    AST 11 05/20/2024    BUN 22 05/20/2024    CALCIUM 9.0 05/20/2024     (H) 05/20/2024    CHOL 192 05/20/2024    CO2 27 05/20/2024    CREATININE 1.20 05/20/2024    HDL 44.5 05/20/2024    HCT 48.4 05/20/2024    HGB 15.9 05/20/2024    HGBA1C 5.8 (H) 05/20/2024    PHOS 2.6 07/31/2023     05/20/2024    K 4.2 05/20/2024    PSA 3.77 08/17/2023     05/20/2024    TRIG 133 05/20/2024    WBC 6.8 05/20/2024         ASSESSMENT:  1. Dilated cardiomyopathy (Multi)        2. CHF (NYHA class III, ACC/AHA stage C) (Multi)        3. Prehypertension        4. Elevated PSA            PLAN:  Patient will continue current diet, exercise plan, and medications.   Ice was recommended when knee flares up again.     Patient was advised to get RSV vaccine in the fall.     I have instructed the patient to follow-up with me in 6 months or sooner if needed.       '

## 2024-07-12 ENCOUNTER — APPOINTMENT (OUTPATIENT)
Dept: CARDIOLOGY | Facility: CLINIC | Age: 69
End: 2024-07-12
Payer: COMMERCIAL

## 2024-07-12 ENCOUNTER — HOSPITAL ENCOUNTER (OUTPATIENT)
Dept: CARDIOLOGY | Facility: HOSPITAL | Age: 69
Discharge: HOME | End: 2024-07-12
Payer: COMMERCIAL

## 2024-07-12 VITALS
HEIGHT: 75 IN | BODY MASS INDEX: 31.08 KG/M2 | SYSTOLIC BLOOD PRESSURE: 118 MMHG | WEIGHT: 250 LBS | DIASTOLIC BLOOD PRESSURE: 62 MMHG | HEART RATE: 72 BPM

## 2024-07-12 DIAGNOSIS — I44.7 LEFT BUNDLE BRANCH BLOCK (LBBB): ICD-10-CM

## 2024-07-12 DIAGNOSIS — Z71.89 ENCOUNTER TO DISCUSS TREATMENT OPTIONS: ICD-10-CM

## 2024-07-12 DIAGNOSIS — I42.8 LEFT VENTRICULAR NON-COMPACTION CARDIOMYOPATHY (MULTI): ICD-10-CM

## 2024-07-12 DIAGNOSIS — Z95.810 BIVENTRICULAR ICD (IMPLANTABLE CARDIOVERTER-DEFIBRILLATOR) IN PLACE: ICD-10-CM

## 2024-07-12 DIAGNOSIS — I50.9 CHF (NYHA CLASS III, ACC/AHA STAGE C) (MULTI): ICD-10-CM

## 2024-07-12 DIAGNOSIS — Z78.9 NEVER SMOKED TOBACCO: ICD-10-CM

## 2024-07-12 DIAGNOSIS — I42.9 CARDIOMYOPATHY, UNSPECIFIED TYPE (MULTI): ICD-10-CM

## 2024-07-12 DIAGNOSIS — Z71.89 ENCOUNTER FOR MEDICATION REVIEW AND COUNSELING: ICD-10-CM

## 2024-07-12 DIAGNOSIS — Z95.810 ICD (IMPLANTABLE CARDIOVERTER-DEFIBRILLATOR) IN PLACE: Primary | ICD-10-CM

## 2024-07-12 PROCEDURE — 1036F TOBACCO NON-USER: CPT | Performed by: INTERNAL MEDICINE

## 2024-07-12 PROCEDURE — 3008F BODY MASS INDEX DOCD: CPT | Performed by: INTERNAL MEDICINE

## 2024-07-12 PROCEDURE — 1159F MED LIST DOCD IN RCRD: CPT | Performed by: INTERNAL MEDICINE

## 2024-07-12 PROCEDURE — 93000 ELECTROCARDIOGRAM COMPLETE: CPT | Mod: DISTINCT PROCEDURAL SERVICE | Performed by: INTERNAL MEDICINE

## 2024-07-12 PROCEDURE — 93284 PRGRMG EVAL IMPLANTABLE DFB: CPT

## 2024-07-12 PROCEDURE — 99214 OFFICE O/P EST MOD 30 MIN: CPT | Performed by: INTERNAL MEDICINE

## 2024-07-12 ASSESSMENT — ENCOUNTER SYMPTOMS
PALPITATIONS: 0
DYSPNEA ON EXERTION: 0

## 2024-07-12 NOTE — PATIENT INSTRUCTIONS
Continue same medications/treatment.  Patient educated on proper medication use.  Patient educated on risk factor modification.  Please bring any lab results from other providers/physicians to your next appointment.    Please bring all medicines, vitamins, and herbal supplements with you when you come to the office.    Prescriptions will not be filled unless you are compliant with your follow up appointments or have a follow up appointment scheduled as per instruction of your physician. Refills should be requested at the time of your visit.    Follow up with [facility] in 6 months with device check  Continue with remote checks at 3 and 9 months    RADHA HYMAN RN, AM SCRIBING FOR AND IN THE PRESENCE OF DR. RADHA FRY MD, FACC, FACP, RS

## 2024-07-12 NOTE — PROGRESS NOTES
"Chief Complaint:   Follow-up (6 mos)     History Of Present Illness:    Alex Alba is a 69 y.o. male presenting with follow-up.  He feels well.  He denies any arrhythmia symptoms.  He denies any lightheadedness, dizziness, near-syncope, or syncope.    His device site healed well.    He is active.  He plays 18 holes of golf without any dyspnea.  He does ride the golf cart.    Last Recorded Vitals:  Vitals:    07/12/24 1021   BP: 118/62   Pulse: 72   Weight: 113 kg (250 lb)   Height: 1.905 m (6' 3\")       Past Medical History:  See List     Past Surgical History:  See LIst      Social History:  He reports that he has never smoked. He has never been exposed to tobacco smoke. He has never used smokeless tobacco. He reports that he does not drink alcohol and does not use drugs.    Family History:  Family History   Problem Relation Name Age of Onset    Diabetes type II Mother      Hypertension Mother      Diabetes Mother      Other (CEREBRALVASCULAR ACCIDENT) Mother      Hypertension Father      Other (URINARY BLADDER CA) Father          Allergies:  Patient has no known allergies.    Outpatient Medications:  Current Outpatient Medications   Medication Instructions    apixaban (Eliquis) 5 mg tablet TAKE 1 TABLET BY MOUTH TWO TIMES A DAY    empagliflozin (JARDIANCE) 10 mg, oral, Daily    Entresto 24-26 mg tablet 1 tablet, oral, 2 times daily    metoprolol succinate XL (TOPROL-XL) 25 mg, oral, Daily, Do not crush or chew.    spironolactone (ALDACTONE) 12.5 mg, oral, Every 48 hours    tamsulosin (Flomax) 0.4 mg 24 hr capsule take 1 capsule by mouth twice a day   Review of Systems   Cardiovascular:  Negative for chest pain, dyspnea on exertion and palpitations.   All other systems reviewed and are negative.        Physical Exam:  Constitutional:       General: Awake.      Appearance: Normal and healthy appearance. Well-developed and not in distress.   Neck:      Vascular: No JVR. JVD normal.   Pulmonary:      Effort: " Pulmonary effort is normal.      Breath sounds: Normal breath sounds. No wheezing. No rhonchi. No rales.   Chest:      Chest wall: Not tender to palpatation.      Comments: Left sided device pocket- healed and well approximated. No swelling or hematoma        Cardiovascular:      PMI at left midclavicular line. Normal rate. Regular rhythm. Normal S1. Normal S2.       Murmurs: There is no murmur.      No gallop.  No click. No rub.   Pulses:     Intact distal pulses.   Edema:     Peripheral edema absent.   Abdominal:      Tenderness: There is no abdominal tenderness.   Musculoskeletal: Normal range of motion.         General: No tenderness. Skin:     General: Skin is warm and dry.   Neurological:      General: No focal deficit present.      Mental Status: Alert and oriented to person, place and time.            Last Labs:  CBC -  Lab Results   Component Value Date    WBC 6.8 05/20/2024    HGB 15.9 05/20/2024    HCT 48.4 05/20/2024    MCV 97 05/20/2024     05/20/2024       CMP -  Lab Results   Component Value Date    CALCIUM 9.0 05/20/2024    PHOS 2.6 07/31/2023    PROT 6.5 05/20/2024    ALBUMIN 3.9 05/20/2024    AST 11 05/20/2024    ALT 10 05/20/2024    ALKPHOS 60 05/20/2024    BILITOT 0.9 05/20/2024       LIPID PANEL -   Lab Results   Component Value Date    CHOL 192 05/20/2024    TRIG 133 05/20/2024    HDL 44.5 05/20/2024    CHHDL 4.3 05/20/2024    LDLF 108 (H) 06/01/2023    VLDL 27 05/20/2024    NHDL 148 05/20/2024       RENAL FUNCTION PANEL -   Lab Results   Component Value Date    GLUCOSE 99 05/20/2024     05/20/2024    K 4.2 05/20/2024     (H) 05/20/2024    CO2 27 05/20/2024    ANIONGAP 13 05/20/2024    BUN 22 05/20/2024    CREATININE 1.20 05/20/2024    GFRMALE CANCELED 09/20/2023    CALCIUM 9.0 05/20/2024    PHOS 2.6 07/31/2023    ALBUMIN 3.9 05/20/2024        Lab Results   Component Value Date    BNP 64 05/20/2024    HGBA1C 5.8 (H) 05/20/2024       Last Cardiology Tests:  ECG:  .  Normal  sinus rhythm.  Appropriate sensing and ventricular pacing.  Normal axis.  Corrected QT interval 470 ms    Device check today.  [facility] CD  Q ICD on field alert.  Estimated longevity device over 5 years.  0 A-fib.  1 NSVT.  22% atrial pacing.  97% biventricular pacing.    Echo:  Transthoracic Echo (TTE) Complete 04/03/2024      Stress Test:  Nuclear Stress Test 01/16/2023      Cardiac Imaging:  MR CARDIAC MORPHOLOGY AND FUNCTION W AND WO IV CONTRAST         Lab review: I have personally reviewed the laboratory result(s) see above    Assessment/Plan   Diagnoses and all orders for this visit:  ICD (implantable cardioverter-defibrillator) in place  Cardiomyopathy, unspecified type (Multi)  Left ventricular non-compaction cardiomyopathy (Multi)  Left bundle branch block (LBBB)  CHF (NYHA class III, ACC/AHA stage C) (Multi)  BMI 31.0-31.9,adult  Never smoked tobacco  Encounter for medication review and counseling  Encounter to discuss treatment options        Maria G Mejias RN    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 LVEF 15 to 20%. Dilated left ventricle at 6.4 cm. NYHA class III C chronic systolic heart failure. Left bundle branch block.  Status post biventricular defibrillator.  [facility] CD  Q biventricular ICD.  Reviewed device check with patient.  Reviewed last echocardiogram with improvement of ejection fraction of 45 to 50% after biventricular pacing and optimal heart failure therapy.  Ordered device checks.  Continue to alternate remote check with device clinic paired with EP office visit.  Device on field alert.  Discussed field alert with patient.  Monitor device as per protocol.  Hypertension and hyperlipidemia as per primary cardiology  Tall stature at 6 foot 3 inches.   AHA recommendations for exercise, diet, and behavioral modification reviewed with pt.     Counseling over 50% visit performed. The patient and I discussed the mechanism of arrhythmia, bradycardia,  tachycardia, biventricular defibrillator, remodeling of the heart, refills, echocardiogram, ejection fraction, indications for and types of medications, discussion if and what medication refills needed, treatment options, risks, benefits, and imponderables. American Heart Association lifestyle changes and behavioral modification discussed. All questions answered in detail. Patient appreciative of care.

## 2024-08-05 ENCOUNTER — LAB (OUTPATIENT)
Dept: LAB | Facility: LAB | Age: 69
End: 2024-08-05
Payer: COMMERCIAL

## 2024-08-05 DIAGNOSIS — R97.20 ELEVATED PSA: ICD-10-CM

## 2024-08-05 LAB — PSA SERPL-MCNC: 4.97 NG/ML

## 2024-08-05 PROCEDURE — 36415 COLL VENOUS BLD VENIPUNCTURE: CPT

## 2024-08-05 PROCEDURE — 84153 ASSAY OF PSA TOTAL: CPT

## 2024-08-13 ENCOUNTER — APPOINTMENT (OUTPATIENT)
Dept: UROLOGY | Facility: CLINIC | Age: 69
End: 2024-08-13
Payer: COMMERCIAL

## 2024-08-13 VITALS
TEMPERATURE: 98.4 F | WEIGHT: 248.6 LBS | DIASTOLIC BLOOD PRESSURE: 69 MMHG | SYSTOLIC BLOOD PRESSURE: 111 MMHG | HEART RATE: 76 BPM | BODY MASS INDEX: 31.07 KG/M2

## 2024-08-13 DIAGNOSIS — N13.8 BPH WITH OBSTRUCTION/LOWER URINARY TRACT SYMPTOMS: ICD-10-CM

## 2024-08-13 DIAGNOSIS — R97.20 ELEVATED PSA: ICD-10-CM

## 2024-08-13 DIAGNOSIS — N40.1 BPH WITH OBSTRUCTION/LOWER URINARY TRACT SYMPTOMS: ICD-10-CM

## 2024-08-13 PROCEDURE — G2211 COMPLEX E/M VISIT ADD ON: HCPCS | Performed by: UROLOGY

## 2024-08-13 PROCEDURE — 1159F MED LIST DOCD IN RCRD: CPT | Performed by: UROLOGY

## 2024-08-13 PROCEDURE — 99213 OFFICE O/P EST LOW 20 MIN: CPT | Performed by: UROLOGY

## 2024-08-13 RX ORDER — TAMSULOSIN HYDROCHLORIDE 0.4 MG/1
0.8 CAPSULE ORAL 2 TIMES DAILY
Qty: 180 CAPSULE | Refills: 3 | Status: SHIPPED | OUTPATIENT
Start: 2024-08-13

## 2024-08-13 NOTE — PROGRESS NOTES
Subjective   Patient ID: Alex Alba is a 69 y.o. male who presents for psa results. Last seen 8/22/24 when    I suggested that if he takes the spironolactone in the morning instead of later in the day, that should improve his nocturia. We will refill his tamsulosin. We will follow up in 1 year with PSA.     HPI  PSA is relatively stable. Patient denies new urinary symptoms. His flow is fine. Patient notes that occasionally first thing in the morning he just has a trickle but after he moves around for a bit the flow problem resolves. Patient denies hematuria and dysuria. Patient notes he is not having trouble with urgency, control, or frequency. Nocturia every 4 hours. He is taking a diuretic and notes that has increased his frequency.     PMH includes management of his heart with an ICD implant.     Patient is caring for his aging parents who are in their 90s and living in nursing facilities.     PSA Results  Component  Ref Range & Units 8 d ago 12 mo ago 1 yr ago 2 yr ago 3 yr ago   Prostate Specific AG  <=4.00 ng/mL 4.97 High  3.77 R, CM 5.49 High  R, CM 3.80 R, CM 5.10 High  R, C     Review of Systems  A 12 system review was completed and is negative with the exception of those signs and symptoms noted in the history of present illness.    Objective   Physical Exam  General: in NAD, appears stated age  Head: normocephalic, atraumatic  Respiratory: normal effort, no use of accessory muscles  Cardiovascular: no edema noted  Skin: normal turgor, no rashes  Neurologic: grossly intact, oriented to person/place/time  Psychiatric: mode and affect appropriate     Assessment/Plan   Problem List Items Addressed This Visit             ICD-10-CM    Elevated PSA R97.20    Relevant Orders    Follow Up In Urology     Refill tamsulosin. Order PSA in 1 year and follow-up with results.       Scribe Attestation  By signing my name below, I, López Ling   attest that this documentation has been prepared under the  direction and in the presence of Darwin Hagan MD.

## 2024-08-15 DIAGNOSIS — I50.9 CHF (NYHA CLASS III, ACC/AHA STAGE C) (MULTI): ICD-10-CM

## 2024-08-15 RX ORDER — SPIRONOLACTONE 25 MG/1
12.5 TABLET ORAL EVERY OTHER DAY
Qty: 23 TABLET | Refills: 3 | Status: SHIPPED | OUTPATIENT
Start: 2024-08-15 | End: 2025-08-15

## 2024-08-15 NOTE — TELEPHONE ENCOUNTER
Received request for prescription refills for patient.   Patient follows with Dr. Ellington and Dr. Lawler     Request is for Spironolactone 25mg QD  Is patient currently on medication yes, but dose was reduced to 12.5mg every other day by Dr. Ellington at visit 4/23/24    Last OV 4/23/24  Next OV 10/21/24    Pended for signing and sent to provider

## 2024-08-19 ENCOUNTER — TELEPHONE (OUTPATIENT)
Dept: UROLOGY | Facility: CLINIC | Age: 69
End: 2024-08-19
Payer: MEDICARE

## 2024-08-19 DIAGNOSIS — N40.1 BPH WITH OBSTRUCTION/LOWER URINARY TRACT SYMPTOMS: ICD-10-CM

## 2024-08-19 DIAGNOSIS — N13.8 BPH WITH OBSTRUCTION/LOWER URINARY TRACT SYMPTOMS: ICD-10-CM

## 2024-08-19 NOTE — TELEPHONE ENCOUNTER
Received message via [facility], pt stated his recent refill of tamsulosin states to take 0.8mg twice daily, and he wanted to verify that his proper tamsulosin dosage should be 0.4mg twice daily for a total of 0.8mg daily. Verified correct dose of 0.4mg twice daily with pt and his pharmacy.

## 2024-09-05 ENCOUNTER — APPOINTMENT (OUTPATIENT)
Dept: RADIOLOGY | Facility: HOSPITAL | Age: 69
End: 2024-09-05
Payer: MEDICARE

## 2024-09-05 ENCOUNTER — HOSPITAL ENCOUNTER (INPATIENT)
Facility: HOSPITAL | Age: 69
LOS: 2 days | Discharge: HOME | End: 2024-09-07
Attending: EMERGENCY MEDICINE | Admitting: INTERNAL MEDICINE
Payer: MEDICARE

## 2024-09-05 ENCOUNTER — APPOINTMENT (OUTPATIENT)
Dept: CARDIOLOGY | Facility: HOSPITAL | Age: 69
End: 2024-09-05
Payer: MEDICARE

## 2024-09-05 DIAGNOSIS — I50.9 CHF (NYHA CLASS III, ACC/AHA STAGE C) (MULTI): ICD-10-CM

## 2024-09-05 DIAGNOSIS — E78.5 HLD (HYPERLIPIDEMIA): ICD-10-CM

## 2024-09-05 DIAGNOSIS — Z98.61 CAD S/P PERCUTANEOUS CORONARY ANGIOPLASTY: ICD-10-CM

## 2024-09-05 DIAGNOSIS — I21.4 NSTEMI (NON-ST ELEVATED MYOCARDIAL INFARCTION) (MULTI): Primary | ICD-10-CM

## 2024-09-05 DIAGNOSIS — I25.10 CAD S/P PERCUTANEOUS CORONARY ANGIOPLASTY: ICD-10-CM

## 2024-09-05 DIAGNOSIS — I42.9 CARDIOMYOPATHY, UNSPECIFIED TYPE (MULTI): ICD-10-CM

## 2024-09-05 LAB
ALBUMIN SERPL BCP-MCNC: 3.5 G/DL (ref 3.4–5)
ALP SERPL-CCNC: 58 U/L (ref 33–136)
ALT SERPL W P-5'-P-CCNC: 8 U/L (ref 10–52)
ANION GAP SERPL CALC-SCNC: 12 MMOL/L (ref 10–20)
APTT PPP: 89 SECONDS (ref 27–38)
AST SERPL W P-5'-P-CCNC: 10 U/L (ref 9–39)
ATRIAL RATE: 44 BPM
ATRIAL RATE: 60 BPM
BASOPHILS # BLD AUTO: 0.03 X10*3/UL (ref 0–0.1)
BASOPHILS NFR BLD AUTO: 0.3 %
BILIRUB SERPL-MCNC: 0.7 MG/DL (ref 0–1.2)
BNP SERPL-MCNC: 115 PG/ML (ref 0–99)
BUN SERPL-MCNC: 23 MG/DL (ref 6–23)
CALCIUM SERPL-MCNC: 9.2 MG/DL (ref 8.6–10.3)
CARDIAC TROPONIN I PNL SERPL HS: 12 NG/L (ref 0–20)
CARDIAC TROPONIN I PNL SERPL HS: 217 NG/L (ref 0–20)
CHLORIDE SERPL-SCNC: 111 MMOL/L (ref 98–107)
CO2 SERPL-SCNC: 24 MMOL/L (ref 21–32)
CREAT SERPL-MCNC: 1.15 MG/DL (ref 0.5–1.3)
EGFRCR SERPLBLD CKD-EPI 2021: 68 ML/MIN/1.73M*2
EOSINOPHIL # BLD AUTO: 0.02 X10*3/UL (ref 0–0.4)
EOSINOPHIL NFR BLD AUTO: 0.2 %
ERYTHROCYTE [DISTWIDTH] IN BLOOD BY AUTOMATED COUNT: 13 % (ref 11.5–14.5)
GLUCOSE SERPL-MCNC: 146 MG/DL (ref 74–99)
HCT VFR BLD AUTO: 46.3 % (ref 41–52)
HGB BLD-MCNC: 15.3 G/DL (ref 13.5–17.5)
IMM GRANULOCYTES # BLD AUTO: 0.06 X10*3/UL (ref 0–0.5)
IMM GRANULOCYTES NFR BLD AUTO: 0.6 % (ref 0–0.9)
INR PPP: 1.1 (ref 0.9–1.1)
INR PPP: 1.3 (ref 0.9–1.1)
LIPASE SERPL-CCNC: 134 U/L (ref 9–82)
LYMPHOCYTES # BLD AUTO: 1.13 X10*3/UL (ref 0.8–3)
LYMPHOCYTES NFR BLD AUTO: 10.8 %
MAGNESIUM SERPL-MCNC: 1.91 MG/DL (ref 1.6–2.4)
MCH RBC QN AUTO: 31.8 PG (ref 26–34)
MCHC RBC AUTO-ENTMCNC: 33 G/DL (ref 32–36)
MCV RBC AUTO: 96 FL (ref 80–100)
MONOCYTES # BLD AUTO: 0.7 X10*3/UL (ref 0.05–0.8)
MONOCYTES NFR BLD AUTO: 6.7 %
NEUTROPHILS # BLD AUTO: 8.49 X10*3/UL (ref 1.6–5.5)
NEUTROPHILS NFR BLD AUTO: 81.4 %
NRBC BLD-RTO: 0 /100 WBCS (ref 0–0)
P AXIS: -3 DEGREES
PLATELET # BLD AUTO: 156 X10*3/UL (ref 150–450)
POTASSIUM SERPL-SCNC: 4.3 MMOL/L (ref 3.5–5.3)
PR INTERVAL: 152 MS
PROT SERPL-MCNC: 6.3 G/DL (ref 6.4–8.2)
PROTHROMBIN TIME: 12.5 SECONDS (ref 9.8–12.8)
PROTHROMBIN TIME: 14.2 SECONDS (ref 9.8–12.8)
Q ONSET: 205 MS
Q ONSET: 215 MS
QRS COUNT: 10 BEATS
QRS COUNT: 10 BEATS
QRS DURATION: 166 MS
QRS DURATION: 178 MS
QT INTERVAL: 402 MS
QT INTERVAL: 474 MS
QTC CALCULATION(BAZETT): 402 MS
QTC CALCULATION(BAZETT): 474 MS
QTC FREDERICIA: 402 MS
QTC FREDERICIA: 474 MS
R AXIS: -15 DEGREES
R AXIS: 4 DEGREES
RBC # BLD AUTO: 4.81 X10*6/UL (ref 4.5–5.9)
SODIUM SERPL-SCNC: 143 MMOL/L (ref 136–145)
T AXIS: 108 DEGREES
T AXIS: 174 DEGREES
T OFFSET: 416 MS
T OFFSET: 442 MS
UFH PPP CHRO-ACNC: 1 IU/ML
VENTRICULAR RATE: 60 BPM
VENTRICULAR RATE: 60 BPM
WBC # BLD AUTO: 10.4 X10*3/UL (ref 4.4–11.3)

## 2024-09-05 PROCEDURE — 84075 ASSAY ALKALINE PHOSPHATASE: CPT | Performed by: EMERGENCY MEDICINE

## 2024-09-05 PROCEDURE — 2500000001 HC RX 250 WO HCPCS SELF ADMINISTERED DRUGS (ALT 637 FOR MEDICARE OP): Performed by: EMERGENCY MEDICINE

## 2024-09-05 PROCEDURE — 85025 COMPLETE CBC W/AUTO DIFF WBC: CPT | Performed by: EMERGENCY MEDICINE

## 2024-09-05 PROCEDURE — 1200000002 HC GENERAL ROOM WITH TELEMETRY DAILY

## 2024-09-05 PROCEDURE — 96374 THER/PROPH/DIAG INJ IV PUSH: CPT | Mod: 59

## 2024-09-05 PROCEDURE — 80048 BASIC METABOLIC PNL TOTAL CA: CPT | Performed by: EMERGENCY MEDICINE

## 2024-09-05 PROCEDURE — 36415 COLL VENOUS BLD VENIPUNCTURE: CPT | Performed by: EMERGENCY MEDICINE

## 2024-09-05 PROCEDURE — 83690 ASSAY OF LIPASE: CPT | Performed by: EMERGENCY MEDICINE

## 2024-09-05 PROCEDURE — 85610 PROTHROMBIN TIME: CPT | Performed by: EMERGENCY MEDICINE

## 2024-09-05 PROCEDURE — 83880 ASSAY OF NATRIURETIC PEPTIDE: CPT | Performed by: EMERGENCY MEDICINE

## 2024-09-05 PROCEDURE — 85520 HEPARIN ASSAY: CPT | Performed by: INTERNAL MEDICINE

## 2024-09-05 PROCEDURE — 93005 ELECTROCARDIOGRAM TRACING: CPT

## 2024-09-05 PROCEDURE — 99285 EMERGENCY DEPT VISIT HI MDM: CPT

## 2024-09-05 PROCEDURE — 83735 ASSAY OF MAGNESIUM: CPT | Performed by: EMERGENCY MEDICINE

## 2024-09-05 PROCEDURE — 2500000002 HC RX 250 W HCPCS SELF ADMINISTERED DRUGS (ALT 637 FOR MEDICARE OP, ALT 636 FOR OP/ED): Performed by: NURSE PRACTITIONER

## 2024-09-05 PROCEDURE — 85730 THROMBOPLASTIN TIME PARTIAL: CPT | Performed by: INTERNAL MEDICINE

## 2024-09-05 PROCEDURE — 99223 1ST HOSP IP/OBS HIGH 75: CPT | Performed by: INTERNAL MEDICINE

## 2024-09-05 PROCEDURE — 71045 X-RAY EXAM CHEST 1 VIEW: CPT | Performed by: RADIOLOGY

## 2024-09-05 PROCEDURE — 85610 PROTHROMBIN TIME: CPT | Performed by: INTERNAL MEDICINE

## 2024-09-05 PROCEDURE — 2500000002 HC RX 250 W HCPCS SELF ADMINISTERED DRUGS (ALT 637 FOR MEDICARE OP, ALT 636 FOR OP/ED): Performed by: EMERGENCY MEDICINE

## 2024-09-05 PROCEDURE — 84484 ASSAY OF TROPONIN QUANT: CPT | Performed by: EMERGENCY MEDICINE

## 2024-09-05 PROCEDURE — 99291 CRITICAL CARE FIRST HOUR: CPT | Performed by: EMERGENCY MEDICINE

## 2024-09-05 PROCEDURE — 71045 X-RAY EXAM CHEST 1 VIEW: CPT

## 2024-09-05 PROCEDURE — 2500000004 HC RX 250 GENERAL PHARMACY W/ HCPCS (ALT 636 FOR OP/ED): Performed by: EMERGENCY MEDICINE

## 2024-09-05 PROCEDURE — 2500000001 HC RX 250 WO HCPCS SELF ADMINISTERED DRUGS (ALT 637 FOR MEDICARE OP): Performed by: INTERNAL MEDICINE

## 2024-09-05 RX ORDER — ACETAMINOPHEN 325 MG/1
500 TABLET ORAL EVERY 6 HOURS PRN
COMMUNITY

## 2024-09-05 RX ORDER — HEPARIN SODIUM 10000 [USP'U]/100ML
0-4000 INJECTION, SOLUTION INTRAVENOUS CONTINUOUS
Status: DISCONTINUED | OUTPATIENT
Start: 2024-09-05 | End: 2024-09-06

## 2024-09-05 RX ORDER — POLYETHYLENE GLYCOL 3350 17 G/17G
17 POWDER, FOR SOLUTION ORAL DAILY
Status: DISCONTINUED | OUTPATIENT
Start: 2024-09-06 | End: 2024-09-06

## 2024-09-05 RX ORDER — TAMSULOSIN HYDROCHLORIDE 0.4 MG/1
0.4 CAPSULE ORAL DAILY
Status: DISCONTINUED | OUTPATIENT
Start: 2024-09-05 | End: 2024-09-06

## 2024-09-05 RX ORDER — METOPROLOL SUCCINATE 25 MG/1
25 TABLET, EXTENDED RELEASE ORAL DAILY
Status: DISCONTINUED | OUTPATIENT
Start: 2024-09-05 | End: 2024-09-07 | Stop reason: HOSPADM

## 2024-09-05 RX ORDER — SPIRONOLACTONE 25 MG/1
12.5 TABLET ORAL EVERY OTHER DAY
Status: DISCONTINUED | OUTPATIENT
Start: 2024-09-06 | End: 2024-09-07 | Stop reason: HOSPADM

## 2024-09-05 RX ORDER — POLYETHYLENE GLYCOL 3350 17 G/17G
17 POWDER, FOR SOLUTION ORAL ONCE
Status: DISCONTINUED | OUTPATIENT
Start: 2024-09-05 | End: 2024-09-06

## 2024-09-05 RX ORDER — ATORVASTATIN CALCIUM 80 MG/1
80 TABLET, FILM COATED ORAL NIGHTLY
Status: DISCONTINUED | OUTPATIENT
Start: 2024-09-05 | End: 2024-09-05

## 2024-09-05 RX ORDER — NAPROXEN SODIUM 220 MG/1
324 TABLET, FILM COATED ORAL ONCE
Status: COMPLETED | OUTPATIENT
Start: 2024-09-05 | End: 2024-09-05

## 2024-09-05 RX ORDER — METOPROLOL TARTRATE 25 MG/1
25 TABLET, FILM COATED ORAL ONCE
Status: COMPLETED | OUTPATIENT
Start: 2024-09-05 | End: 2024-09-05

## 2024-09-05 SDOH — SOCIAL STABILITY: SOCIAL INSECURITY: ABUSE: ADULT

## 2024-09-05 SDOH — SOCIAL STABILITY: SOCIAL INSECURITY: ARE THERE ANY APPARENT SIGNS OF INJURIES/BEHAVIORS THAT COULD BE RELATED TO ABUSE/NEGLECT?: NO

## 2024-09-05 SDOH — SOCIAL STABILITY: SOCIAL INSECURITY: WERE YOU ABLE TO COMPLETE ALL THE BEHAVIORAL HEALTH SCREENINGS?: YES

## 2024-09-05 SDOH — SOCIAL STABILITY: SOCIAL INSECURITY: DO YOU FEEL ANYONE HAS EXPLOITED OR TAKEN ADVANTAGE OF YOU FINANCIALLY OR OF YOUR PERSONAL PROPERTY?: NO

## 2024-09-05 SDOH — SOCIAL STABILITY: SOCIAL INSECURITY: HAVE YOU HAD ANY THOUGHTS OF HARMING ANYONE ELSE?: NO

## 2024-09-05 SDOH — SOCIAL STABILITY: SOCIAL INSECURITY: ARE YOU OR HAVE YOU BEEN THREATENED OR ABUSED PHYSICALLY, EMOTIONALLY, OR SEXUALLY BY ANYONE?: NO

## 2024-09-05 SDOH — SOCIAL STABILITY: SOCIAL INSECURITY: HAVE YOU HAD THOUGHTS OF HARMING ANYONE ELSE?: NO

## 2024-09-05 SDOH — SOCIAL STABILITY: SOCIAL INSECURITY: DOES ANYONE TRY TO KEEP YOU FROM HAVING/CONTACTING OTHER FRIENDS OR DOING THINGS OUTSIDE YOUR HOME?: NO

## 2024-09-05 SDOH — SOCIAL STABILITY: SOCIAL INSECURITY: DO YOU FEEL UNSAFE GOING BACK TO THE PLACE WHERE YOU ARE LIVING?: NO

## 2024-09-05 SDOH — SOCIAL STABILITY: SOCIAL INSECURITY: HAS ANYONE EVER THREATENED TO HURT YOUR FAMILY OR YOUR PETS?: NO

## 2024-09-05 ASSESSMENT — COGNITIVE AND FUNCTIONAL STATUS - GENERAL
MOBILITY SCORE: 24
DAILY ACTIVITIY SCORE: 24
PATIENT BASELINE BEDBOUND: NO
DAILY ACTIVITIY SCORE: 24
MOBILITY SCORE: 24

## 2024-09-05 ASSESSMENT — PAIN SCALES - GENERAL
PAINLEVEL_OUTOF10: 0 - NO PAIN
PAINLEVEL_OUTOF10: 5 - MODERATE PAIN

## 2024-09-05 ASSESSMENT — HEART SCORE
RISK FACTORS: 1-2 RISK FACTORS
TROPONIN: 1-3 TIMES NORMAL LIMIT
AGE: 65+
ECG: NON-SPECIFIC REPOLARIZATION DISTURBANCE
HISTORY: MODERATELY SUSPICIOUS
HEART SCORE: 6

## 2024-09-05 ASSESSMENT — LIFESTYLE VARIABLES
HAVE PEOPLE ANNOYED YOU BY CRITICIZING YOUR DRINKING: NO
HOW OFTEN DO YOU HAVE 6 OR MORE DRINKS ON ONE OCCASION: NEVER
HOW OFTEN DO YOU HAVE A DRINK CONTAINING ALCOHOL: NEVER
SKIP TO QUESTIONS 9-10: 1
EVER FELT BAD OR GUILTY ABOUT YOUR DRINKING: NO
AUDIT-C TOTAL SCORE: 0
HAVE YOU EVER FELT YOU SHOULD CUT DOWN ON YOUR DRINKING: NO
HOW MANY STANDARD DRINKS CONTAINING ALCOHOL DO YOU HAVE ON A TYPICAL DAY: PATIENT DOES NOT DRINK
EVER HAD A DRINK FIRST THING IN THE MORNING TO STEADY YOUR NERVES TO GET RID OF A HANGOVER: NO
AUDIT-C TOTAL SCORE: 0
TOTAL SCORE: 0

## 2024-09-05 ASSESSMENT — ACTIVITIES OF DAILY LIVING (ADL)
HEARING - LEFT EAR: FUNCTIONAL
JUDGMENT_ADEQUATE_SAFELY_COMPLETE_DAILY_ACTIVITIES: YES
TOILETING: INDEPENDENT
BATHING: INDEPENDENT
LACK_OF_TRANSPORTATION: NO
WALKS IN HOME: INDEPENDENT
FEEDING YOURSELF: INDEPENDENT
PATIENT'S MEMORY ADEQUATE TO SAFELY COMPLETE DAILY ACTIVITIES?: YES
ADEQUATE_TO_COMPLETE_ADL: YES
HEARING - RIGHT EAR: FUNCTIONAL
DRESSING YOURSELF: INDEPENDENT
GROOMING: INDEPENDENT

## 2024-09-05 ASSESSMENT — PAIN DESCRIPTION - FREQUENCY: FREQUENCY: CONSTANT/CONTINUOUS

## 2024-09-05 ASSESSMENT — PAIN DESCRIPTION - DESCRIPTORS: DESCRIPTORS: PRESSURE

## 2024-09-05 ASSESSMENT — COLUMBIA-SUICIDE SEVERITY RATING SCALE - C-SSRS
1. IN THE PAST MONTH, HAVE YOU WISHED YOU WERE DEAD OR WISHED YOU COULD GO TO SLEEP AND NOT WAKE UP?: NO
6. HAVE YOU EVER DONE ANYTHING, STARTED TO DO ANYTHING, OR PREPARED TO DO ANYTHING TO END YOUR LIFE?: NO
1. IN THE PAST MONTH, HAVE YOU WISHED YOU WERE DEAD OR WISHED YOU COULD GO TO SLEEP AND NOT WAKE UP?: NO
2. HAVE YOU ACTUALLY HAD ANY THOUGHTS OF KILLING YOURSELF?: NO
6. HAVE YOU EVER DONE ANYTHING, STARTED TO DO ANYTHING, OR PREPARED TO DO ANYTHING TO END YOUR LIFE?: NO
2. HAVE YOU ACTUALLY HAD ANY THOUGHTS OF KILLING YOURSELF?: NO

## 2024-09-05 ASSESSMENT — PATIENT HEALTH QUESTIONNAIRE - PHQ9
SUM OF ALL RESPONSES TO PHQ9 QUESTIONS 1 & 2: 0
2. FEELING DOWN, DEPRESSED OR HOPELESS: NOT AT ALL
1. LITTLE INTEREST OR PLEASURE IN DOING THINGS: NOT AT ALL

## 2024-09-05 ASSESSMENT — ENCOUNTER SYMPTOMS
ALLERGIC/IMMUNOLOGIC NEGATIVE: 1
RESPIRATORY NEGATIVE: 1
CONSTITUTIONAL NEGATIVE: 1
ENDOCRINE NEGATIVE: 1
MUSCULOSKELETAL NEGATIVE: 1
EYES NEGATIVE: 1
GASTROINTESTINAL NEGATIVE: 1
PSYCHIATRIC NEGATIVE: 1

## 2024-09-05 ASSESSMENT — PAIN - FUNCTIONAL ASSESSMENT
PAIN_FUNCTIONAL_ASSESSMENT: 0-10
PAIN_FUNCTIONAL_ASSESSMENT: 0-10

## 2024-09-05 ASSESSMENT — PAIN DESCRIPTION - LOCATION: LOCATION: CHEST

## 2024-09-05 ASSESSMENT — PAIN DESCRIPTION - PAIN TYPE: TYPE: ACUTE PAIN

## 2024-09-05 NOTE — Clinical Note
Angioplasty of the right coronary artery lesion. Inflation 1: Pressure = 16 geovanna; Duration = 19 sec. Inflation 2: Pressure = 16 geovanna; Duration = 6 sec.

## 2024-09-05 NOTE — ED PROCEDURE NOTE
Procedure  Critical Care    Performed by: Tiburcio FRAZIER MD  Authorized by: Tiburcio FRAZIER MD    Critical care provider statement:     Critical care time (minutes):  35    Critical care time was exclusive of:  Separately billable procedures and treating other patients    Critical care was necessary to treat or prevent imminent or life-threatening deterioration of the following conditions:  Circulatory failure (NSTEMI)    Critical care was time spent personally by me on the following activities:  Blood draw for specimens, discussions with primary provider, evaluation of patient's response to treatment, examination of patient, obtaining history from patient or surrogate, ordering and performing treatments and interventions, ordering and review of laboratory studies, ordering and review of radiographic studies, pulse oximetry and re-evaluation of patient's condition    Care discussed with: admitting provider                 Tiburcio FRAZIER MD  09/05/24 2921

## 2024-09-05 NOTE — H&P
History Of Present Illness  Alex Alba is a 69 y.o. male with past medical history of nonischemic cardiomyopathy, status post ICD placement, history of LVEF of 15 to 20% later recovered to 45 to 50% with biventricular pacing and optimal heart therapy, hypertension, hyperlipidemia comes into the hospital due to chest pain.  The patient states a couple of days ago he had some spaghetti and had acid reflux that improved slowly.  The patient states this morning he woke up with some left-sided chest discomfort that got worst and then he eventually became sweaty and decided to call 911.  The patient denies any shortness of breath or any associated palpitations.  He states he has been doing very well since AICD and pacemaker placement in his heart function has recovered.  The patient did receive aspirin and nitro en route without any significant relief.  The patient states after arrival to the hospital his chest discomfort had subsided.  He otherwise denies any nausea vomiting abdominal pain fever chills at this time.  In ED his initial troponins were 12 that were 217 on subsequent reading.  The patient was started on heparin drip was also started with Brilinta.  The patient is admitted with NSTEMI for further care and management.     Past Medical History  Past Medical History:   Diagnosis Date    Acute maxillary sinusitis, unspecified 07/27/2020    Subacute maxillary sinusitis    Cellulitis of right finger 06/03/2019    Paronychia of finger of right hand    CHF (congestive heart failure) (Multi)     Encounter for immunization     Encounter for immunization    Encounter for screening for lipoid disorders 12/03/2019    Screening, lipid    Encounter for screening for malignant neoplasm of colon 05/13/2021    Encounter for screening for colorectal malignant neoplasm    Encounter for screening for malignant neoplasm of prostate 12/03/2019    Screening PSA (prostate specific antigen)    Hypertension     Impingement  syndrome of left shoulder     Impingement syndrome of left shoulder    Personal history of other diseases of the musculoskeletal system and connective tissue     History of arthritis    Personal history of other drug therapy 05/28/2020    History of pneumococcal vaccination    Personal history of other specified conditions 08/29/2019    History of fatigue       Surgical History  Past Surgical History:   Procedure Laterality Date    CARDIAC DEFIBRILLATOR PLACEMENT      OTHER SURGICAL HISTORY  10/19/2021    Prostate biopsy    OTHER SURGICAL HISTORY  06/15/2021    Hernia repair    OTHER SURGICAL HISTORY  06/15/2021    Colonoscopy        Social History  He reports that he has never smoked. He has never been exposed to tobacco smoke. He has never used smokeless tobacco. He reports that he does not drink alcohol and does not use drugs.    Family History  Family History   Problem Relation Name Age of Onset    Diabetes type II Mother      Hypertension Mother      Diabetes Mother      Other (CEREBRALVASCULAR ACCIDENT) Mother      Hypertension Father      Other (URINARY BLADDER CA) Father          Allergies  Patient has no known allergies.    Review of Systems   Constitutional: Negative.    HENT: Negative.     Eyes: Negative.    Respiratory: Negative.     Cardiovascular:  Positive for chest pain.   Gastrointestinal: Negative.    Endocrine: Negative.    Musculoskeletal: Negative.    Skin: Negative.    Allergic/Immunologic: Negative.    Psychiatric/Behavioral: Negative.     All other systems reviewed and are negative.       Physical Exam  Constitutional:       Appearance: Normal appearance.   HENT:      Head: Normocephalic and atraumatic.      Nose: Nose normal.      Mouth/Throat:      Mouth: Mucous membranes are dry.   Eyes:      Extraocular Movements: Extraocular movements intact.      Conjunctiva/sclera: Conjunctivae normal.   Cardiovascular:      Rate and Rhythm: Normal rate and regular rhythm.      Pulses: Normal pulses.  "     Heart sounds: Normal heart sounds.   Pulmonary:      Effort: Pulmonary effort is normal.      Breath sounds: Normal breath sounds.   Abdominal:      General: Bowel sounds are normal.      Palpations: Abdomen is soft.   Musculoskeletal:         General: Normal range of motion.      Cervical back: Normal range of motion and neck supple.   Skin:     General: Skin is warm and dry.   Neurological:      General: No focal deficit present.      Mental Status: He is alert and oriented to person, place, and time. Mental status is at baseline.   Psychiatric:         Mood and Affect: Mood normal.         Behavior: Behavior normal.         Thought Content: Thought content normal.         Judgment: Judgment normal.          Last Recorded Vitals  Blood pressure 110/66, pulse 60, temperature 35.9 °C (96.6 °F), temperature source Temporal, resp. rate 18, height 1.905 m (6' 3\"), weight 111 kg (245 lb), SpO2 95%.    Relevant Results        Alex Alba is a 69 y.o. male with past medical history of nonischemic cardiomyopathy, status post ICD placement, history of LVEF of 15 to 20% later recovered to 45 to 50% with biventricular pacing and optimal heart therapy, hypertension, hyperlipidemia comes into the hospital due to chest pain.      Assessment/Plan   Assessment & Plan  NSTEMI (non-ST elevated myocardial infarction) (Multi)      NSTEMI  -EKG without any significant changes.  Continue with serial EKGs  -Troponins trending up initial troponin was 12 subsequent troponin 217.  -Continue with heparin drip.  Continue with aspirin statin beta-blocker and currently on Brilinta.  -Cardiology has been consulted    Nonischemic cardiomyopathy status post biventricular defibrillator  Chronic systolic congestive heart failure  -Continue with GDMT  -Currently holding Eliquis.  Per the patient patient has history of atrial fibrillation and has been compliant with his Eliquis therapy.    Hypertension  Hyperlipidemia  -Continue with home " medications once reconciled.    VTE prophylaxis-heparin drip       I spent 50 minutes in the professional and overall care of this patient.      Jeimy Manjarrez MD

## 2024-09-05 NOTE — ED PROVIDER NOTES
HPI   Chief Complaint   Patient presents with    Chest Pain     Left-sided chest pressure from home for 1 hour and 20min prior to arrival         History provided by:  Patient and EMS personnel  History of Present Illness:  69-year-old male presents with chest pain and pressure.  He states that a couple of nights ago he had spaghetti with red sauce and thought he was having reflux.  It got better on its own then he had similar symptoms last night which again got better.  This morning he felt better, but then he started with left-sided chest pressure at rest.  EMS gave him aspirin and 1 nitro which did drop his blood pressure.  Patient states he feels slightly better at this time.  No fever, chills or cough.  No shortness of breath.  No back or flank pain.  No abdominal pain.  No nausea or vomiting.      PMFSH:   As per HPI, otherwise nurses notes reviewed in EMR.    Past Medical History:   Active Ambulatory Problems     Diagnosis Date Noted    BPH with obstruction/lower urinary tract symptoms 04/12/2023    Elevated PSA 04/12/2023    Fibromyalgia 04/12/2023    Gastroesophageal reflux disease without esophagitis 04/12/2023    Hematuria 04/12/2023    Left bundle branch block (LBBB) 04/12/2023    Nocturia 04/12/2023    Plantar callus 04/12/2023    Polyp of colon 04/12/2023    Prehypertension 04/12/2023    SK (seborrheic keratosis) 04/12/2023    Urinary retention with incomplete bladder emptying 04/12/2023    Class 1 obesity due to excess calories without serious comorbidity with body mass index (BMI) of 30.0 to 30.9 in adult 06/01/2023    Cardiomyopathy (Multi) 06/01/2023    Abnormal EKG 08/16/2023    Abnormal nuclear stress test 08/16/2023    Cardiac mass 08/16/2023    Epidermal inclusion cyst 08/16/2023    Exertional shortness of breath 08/16/2023    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    Mass of left cardiac ventricle 08/16/2023    CHF (NYHA class III, ACC/AHA stage C) (Multi) 08/16/2023     BMI 30.0-30.9,adult 04/23/2024    Never smoked tobacco 04/23/2024    BMI 31.0-31.9,adult 07/12/2024    Encounter for medication review and counseling 07/12/2024    Encounter to discuss treatment options 07/12/2024     Resolved Ambulatory Problems     Diagnosis Date Noted    No Resolved Ambulatory Problems     Past Medical History:   Diagnosis Date    Acute maxillary sinusitis, unspecified 07/27/2020    Cellulitis of right finger 06/03/2019    CHF (congestive heart failure) (Multi)     Encounter for immunization     Encounter for screening for lipoid disorders 12/03/2019    Encounter for screening for malignant neoplasm of colon 05/13/2021    Encounter for screening for malignant neoplasm of prostate 12/03/2019    Hypertension     Impingement syndrome of left shoulder     Personal history of other diseases of the musculoskeletal system and connective tissue     Personal history of other drug therapy 05/28/2020    Personal history of other specified conditions 08/29/2019      Past Surgical History:   Past Surgical History:   Procedure Laterality Date    CARDIAC DEFIBRILLATOR PLACEMENT      OTHER SURGICAL HISTORY  10/19/2021    Prostate biopsy    OTHER SURGICAL HISTORY  06/15/2021    Hernia repair    OTHER SURGICAL HISTORY  06/15/2021    Colonoscopy      Family History:   Family History   Problem Relation Name Age of Onset    Diabetes type II Mother      Hypertension Mother      Diabetes Mother      Other (CEREBRALVASCULAR ACCIDENT) Mother      Hypertension Father      Other (URINARY BLADDER CA) Father        Social History:    Social History     Socioeconomic History    Marital status:      Spouse name: Not on file    Number of children: Not on file    Years of education: Not on file    Highest education level: Not on file   Occupational History    Not on file   Tobacco Use    Smoking status: Never     Passive exposure: Never    Smokeless tobacco: Never   Substance and Sexual Activity    Alcohol use: Never     Drug use: Never    Sexual activity: Defer   Other Topics Concern    Not on file   Social History Narrative    Not on file     Social Determinants of Health     Financial Resource Strain: Not on file   Food Insecurity: Not on file   Transportation Needs: Not on file   Physical Activity: Not on file   Stress: Not on file   Social Connections: Not on file   Intimate Partner Violence: Not on file   Housing Stability: Not on file              Patient History   Past Medical History:   Diagnosis Date    Acute maxillary sinusitis, unspecified 07/27/2020    Subacute maxillary sinusitis    Cellulitis of right finger 06/03/2019    Paronychia of finger of right hand    CHF (congestive heart failure) (Multi)     Encounter for immunization     Encounter for immunization    Encounter for screening for lipoid disorders 12/03/2019    Screening, lipid    Encounter for screening for malignant neoplasm of colon 05/13/2021    Encounter for screening for colorectal malignant neoplasm    Encounter for screening for malignant neoplasm of prostate 12/03/2019    Screening PSA (prostate specific antigen)    Hypertension     Impingement syndrome of left shoulder     Impingement syndrome of left shoulder    Personal history of other diseases of the musculoskeletal system and connective tissue     History of arthritis    Personal history of other drug therapy 05/28/2020    History of pneumococcal vaccination    Personal history of other specified conditions 08/29/2019    History of fatigue     Past Surgical History:   Procedure Laterality Date    CARDIAC DEFIBRILLATOR PLACEMENT      OTHER SURGICAL HISTORY  10/19/2021    Prostate biopsy    OTHER SURGICAL HISTORY  06/15/2021    Hernia repair    OTHER SURGICAL HISTORY  06/15/2021    Colonoscopy     Family History   Problem Relation Name Age of Onset    Diabetes type II Mother      Hypertension Mother      Diabetes Mother      Other (CEREBRALVASCULAR ACCIDENT) Mother      Hypertension Father       Other (URINARY BLADDER CA) Father       Social History     Tobacco Use    Smoking status: Never     Passive exposure: Never    Smokeless tobacco: Never   Substance Use Topics    Alcohol use: Never    Drug use: Never       Physical Exam   ED Triage Vitals   Temp Pulse Resp BP   -- -- -- --      SpO2 Temp src Heart Rate Source Patient Position   -- -- -- --      BP Location FiO2 (%)     -- --       Physical Exam  Physical Exam:    ED Triage Vitals   Temp Pulse Resp BP   -- -- -- --      SpO2 Temp src Heart Rate Source Patient Position   -- -- -- --      BP Location FiO2 (%)     -- --         Constitutional: Vital signs per nursing notes.  Well developed, well nourished.  No acute distress.    Psychiatric: alert and oriented to person, place, and time; no abnormalities of mood or affect; memory intact  Eyes: PERRL; conjunctivae and lids normal; EOMI  ENT: otoscopic exam of external canal and TM´s normal; nasal mucosa, turbinates, and septum normal; mouth, tongue, and pharynx normal; pharynx without edema, exudate, or injection  Respiratory: normal respiratory effort and excursion; no rales, rhonchi, or wheezes; equal air entry  Cardiovascular: regular rate and rhythm; no murmurs, rubs or gallops; symmetric pulses; no edema; normal capillary refill; distal pulses present  Neurological: normal speech; CN II-XII grossly intact; normal motor and sensory function; no nystagmus; no pronator drift; normal finger to nose and heel to shin tests  GI: no masses, tenderness, rebound or guarding; no palpable, pulsatile mass; no organomegaly; no hernia; normal bowel sounds; (-) Coleman´s sign; (-) McBurney´s sign; (-) CVA tenderness  Lymphatic: no adenopathy of neck  Musculoskeletal: normal gait and station; normal digits and nails; no gross tendon or ligament injury; normal to palpation; normal strength/tone; neurovascular status intact; (-) Vale´s sign; (-) straight leg raise  Skin: normal to inspection; normal to palpation; no  rash  GCS: 15      ED Course & MDM   Diagnoses as of 09/05/24 1657   NSTEMI (non-ST elevated myocardial infarction) (Multi)                 No data recorded     Monica Coma Scale Score: 15 (09/05/24 1301 : Meeta Shaffer RN) HEART Score: 6 (09/05/24 1611 : Tiburcio FRAZIER MD)                         Medical Decision Making  Medical Decision Making:    Differential Diagnoses Considered: ACS, arrhythmia, GERD     EKG: My interpretation of EKG is paced rhythm at 60 bpm with left bundle branch block and nonspecific ST-T changes              My interpretation of second EKG is paced rhythm at 60 bpm with left bundle branch block and nonspecific ST-T changes    Labs Reviewed   CBC WITH AUTO DIFFERENTIAL - Abnormal       Result Value    WBC 10.4      nRBC 0.0      RBC 4.81      Hemoglobin 15.3      Hematocrit 46.3      MCV 96      MCH 31.8      MCHC 33.0      RDW 13.0      Platelets 156      Neutrophils % 81.4      Immature Granulocytes %, Automated 0.6      Lymphocytes % 10.8      Monocytes % 6.7      Eosinophils % 0.2      Basophils % 0.3      Neutrophils Absolute 8.49 (*)     Immature Granulocytes Absolute, Automated 0.06      Lymphocytes Absolute 1.13      Monocytes Absolute 0.70      Eosinophils Absolute 0.02      Basophils Absolute 0.03     COMPREHENSIVE METABOLIC PANEL - Abnormal    Glucose 146 (*)     Sodium 143      Potassium 4.3      Chloride 111 (*)     Bicarbonate 24      Anion Gap 12      Urea Nitrogen 23      Creatinine 1.15      eGFR 68      Calcium 9.2      Albumin 3.5      Alkaline Phosphatase 58      Total Protein 6.3 (*)     AST 10      Bilirubin, Total 0.7      ALT 8 (*)    B-TYPE NATRIURETIC PEPTIDE - Abnormal     (*)     Narrative:        <100 pg/mL - Heart failure unlikely  100-299 pg/mL - Intermediate probability of acute heart                  failure exacerbation. Correlate with clinical                  context and patient history.    >=300 pg/mL - Heart Failure likely. Correlate with  clinical                  context and patient history.    BNP testing is performed using different testing methodology at [facility] than at other [facility]. Direct result comparisons should only be made within the same method.      LIPASE - Abnormal    Lipase 134 (*)     Narrative:     Venipuncture immediately after or during the administration of Metamizole may lead to falsely low results. Testing should be performed immediately prior to Metamizole dosing.   SERIAL TROPONIN, 1 HOUR - Abnormal    Troponin I, High Sensitivity 217 (*)     Narrative:     Less than 99th percentile of normal range cutoff-  Female and children under 18 years old <14 ng/L; Male <21 ng/L: Negative  Repeat testing should be performed if clinically indicated.     Female and children under 18 years old 14-50 ng/L; Male 21-50 ng/L:  Consistent with possible cardiac damage and possible increased clinical   risk. Serial measurements may help to assess extent of myocardial damage.     >50 ng/L: Consistent with cardiac damage, increased clinical risk and  myocardial infarction. Serial measurements may help assess extent of   myocardial damage.      NOTE: Children less than 1 year old may have higher baseline troponin   levels and results should be interpreted in conjunction with the overall   clinical context.     NOTE: Troponin I testing is performed using a different   testing methodology at [facility] than at other   [facility]. Direct result comparisons should only   be made within the same method.   MAGNESIUM - Normal    Magnesium 1.91     PROTIME-INR - Normal    Protime 12.5      INR 1.1     SERIAL TROPONIN-INITIAL - Normal    Troponin I, High Sensitivity 12      Narrative:     Less than 99th percentile of normal range cutoff-  Female and children under 18 years old <14 ng/L; Male <21 ng/L: Negative  Repeat testing should be performed if clinically indicated.     Female and children under 18 years old  14-50 ng/L; Male 21-50 ng/L:  Consistent with possible cardiac damage and possible increased clinical   risk. Serial measurements may help to assess extent of myocardial damage.     >50 ng/L: Consistent with cardiac damage, increased clinical risk and  myocardial infarction. Serial measurements may help assess extent of   myocardial damage.      NOTE: Children less than 1 year old may have higher baseline troponin   levels and results should be interpreted in conjunction with the overall   clinical context.     NOTE: Troponin I testing is performed using a different   testing methodology at [facility] than at other   [facility]. Direct result comparisons should only   be made within the same method.   TROPONIN SERIES- (INITIAL, 1 HR)    Narrative:     The following orders were created for panel order Troponin I Series, High Sensitivity (0, 1 HR).  Procedure                               Abnormality         Status                     ---------                               -----------         ------                     Troponin I, High Sensiti...[[id number]]  Normal              Final result               Troponin, High Sensitivi...[[id number]]  Abnormal            Final result                 Please view results for these tests on the individual orders.   APTT   PROTIME-INR       XR chest 1 view   Final Result   Elevated left hemidiaphragm with likely atelectasis and/or scarring   in the left lung base.        MACRO:   None        Signed by: Duke Scott 9/5/2024 1:30 PM   Dictation workstation:   [id number]          Diagnostic testing considered:         Review of recent and relevant records:    ED Medication Administration:   ED Medication Administration from 09/05/2024 1243 to 09/05/2024 1657         Date/Time Order Dose Route Action Action by     09/05/2024 1641 EDT aspirin chewable tablet 324 mg 324 mg oral Given DEYA Shaffer     09/05/2024 1641 EDT ticagrelor (Brilinta) tablet 180 mg 180 mg  oral Given DEYA Shaffer     09/05/2024 1642 EDT heparin 25,000 Units in dextrose 5% 250 mL (100 Units/mL) infusion (premix) 1,000 Units/hr intravenous New Bag DEYA Shaffer     09/05/2024 1642 EDT metoprolol tartrate (Lopressor) tablet 25 mg 25 mg oral Given DEYA Shaffer     09/05/2024 1645 EDT heparin bolus from bag 4,000 Units 4,000 Units intravenous Bolus from Bag DEYA Shaffer     09/05/2024 1653 EDT aspirin chewable tablet 324 mg 324 mg oral Not Given DEYA Shaffer            Prescription Medication Consideration/Given:     Social Determinants of Health Significantly Affecting Care:      Summary:    /66 (09/05/24 1605)    Temp      Pulse 60 (09/05/24 1605)   Resp      SpO2 95 % (09/05/24 1605)      Abnormal Labs Reviewed   CBC WITH AUTO DIFFERENTIAL - Abnormal; Notable for the following components:       Result Value    Neutrophils Absolute 8.49 (*)     All other components within normal limits   COMPREHENSIVE METABOLIC PANEL - Abnormal; Notable for the following components:    Glucose 146 (*)     Chloride 111 (*)     Total Protein 6.3 (*)     ALT 8 (*)     All other components within normal limits   B-TYPE NATRIURETIC PEPTIDE - Abnormal; Notable for the following components:     (*)     All other components within normal limits    Narrative:        <100 pg/mL - Heart failure unlikely  100-299 pg/mL - Intermediate probability of acute heart                  failure exacerbation. Correlate with clinical                  context and patient history.    >=300 pg/mL - Heart Failure likely. Correlate with clinical                  context and patient history.    BNP testing is performed using different testing methodology at [facility] than at other [facility] hospitals. Direct result comparisons should only be made within the same method.      LIPASE - Abnormal; Notable for the following components:    Lipase 134 (*)     All other components within normal limits    Narrative:     Venipuncture immediately after or  during the administration of Metamizole may lead to falsely low results. Testing should be performed immediately prior to Metamizole dosing.   SERIAL TROPONIN, 1 HOUR - Abnormal; Notable for the following components:    Troponin I, High Sensitivity 217 (*)     All other components within normal limits    Narrative:     Less than 99th percentile of normal range cutoff-  Female and children under 18 years old <14 ng/L; Male <21 ng/L: Negative  Repeat testing should be performed if clinically indicated.     Female and children under 18 years old 14-50 ng/L; Male 21-50 ng/L:  Consistent with possible cardiac damage and possible increased clinical   risk. Serial measurements may help to assess extent of myocardial damage.     >50 ng/L: Consistent with cardiac damage, increased clinical risk and  myocardial infarction. Serial measurements may help assess extent of   myocardial damage.      NOTE: Children less than 1 year old may have higher baseline troponin   levels and results should be interpreted in conjunction with the overall   clinical context.     NOTE: Troponin I testing is performed using a different   testing methodology at [facility] than at other   [facility]. Direct result comparisons should only   be made within the same method.     Diagnostic evaluation was completed.  Initial high-sensitivity troponin was in the normal range at 12.  The repeat was elevated at 217.  This is suspicious for a non-ST elevation MI.  BNP was elevated at 115.  Lipase was slightly elevated at 134.  Metabolic panel shows an elevated glucose of 146.  Sodium potassium levels are in the normal range.  Renal and liver function are in the normal range.  INR is 1.1.  CBC shows a normal white blood cell count and no evidence of anemia.  Chest x-ray shows elevated left hemidiaphragm with likely atelectasis and/or scarring in the left lung base.    The patient was treated with medications for suspected non-ST elevation  MI.  He will be hospitalized for further workup and evaluation.    Heart score is 6.    I discussed the results and plan for hospitalization with the patient and/or family/friend if present.  Questions were addressed.  Patient and/or family/friend expressed understanding.    The patient's condition requires ongoing treatment and evaluation necessitating hospital admission.  I have reviewed the patient's history, physical exam, and test information with the admitting physician,    Dr. Manjarrez               , who agrees to hospitalize the patient.  He will consult cardiology for likely catheterization tomorrow.      Diagnoses as of 09/05/24 1657   NSTEMI (non-ST elevated myocardial infarction) (Multi)         Procedure  Procedures     Tiburcio FRAZIER MD  09/05/24 9630

## 2024-09-06 ENCOUNTER — PHARMACY VISIT (OUTPATIENT)
Dept: PHARMACY | Facility: CLINIC | Age: 69
End: 2024-09-06
Payer: COMMERCIAL

## 2024-09-06 ENCOUNTER — APPOINTMENT (OUTPATIENT)
Dept: CARDIOLOGY | Facility: HOSPITAL | Age: 69
End: 2024-09-06
Payer: MEDICARE

## 2024-09-06 LAB
ACT BLD: 390 SEC (ref 83–199)
ANION GAP SERPL CALC-SCNC: 9 MMOL/L (ref 10–20)
ATRIAL RATE: 55 BPM
ATRIAL RATE: 60 BPM
BODY SURFACE AREA: 2.41 M2
BUN SERPL-MCNC: 24 MG/DL (ref 6–23)
CALCIUM SERPL-MCNC: 8.7 MG/DL (ref 8.6–10.3)
CARDIAC TROPONIN I PNL SERPL HS: ABNORMAL NG/L (ref 0–20)
CARDIAC TROPONIN I PNL SERPL HS: ABNORMAL NG/L (ref 0–20)
CHLORIDE SERPL-SCNC: 111 MMOL/L (ref 98–107)
CHOLEST SERPL-MCNC: 156 MG/DL (ref 0–199)
CHOLESTEROL/HDL RATIO: 4.2
CO2 SERPL-SCNC: 26 MMOL/L (ref 21–32)
CREAT SERPL-MCNC: 1.15 MG/DL (ref 0.5–1.3)
EGFRCR SERPLBLD CKD-EPI 2021: 68 ML/MIN/1.73M*2
ERYTHROCYTE [DISTWIDTH] IN BLOOD BY AUTOMATED COUNT: 13.2 % (ref 11.5–14.5)
GLUCOSE SERPL-MCNC: 119 MG/DL (ref 74–99)
HCT VFR BLD AUTO: 42.8 % (ref 41–52)
HDLC SERPL-MCNC: 36.9 MG/DL
HGB BLD-MCNC: 14.1 G/DL (ref 13.5–17.5)
HOLD SPECIMEN: NORMAL
HOLD SPECIMEN: NORMAL
LDLC SERPL CALC-MCNC: 100 MG/DL
MAGNESIUM SERPL-MCNC: 1.96 MG/DL (ref 1.6–2.4)
MCH RBC QN AUTO: 31.2 PG (ref 26–34)
MCHC RBC AUTO-ENTMCNC: 32.9 G/DL (ref 32–36)
MCV RBC AUTO: 95 FL (ref 80–100)
NON HDL CHOLESTEROL: 119 MG/DL (ref 0–149)
NRBC BLD-RTO: 0 /100 WBCS (ref 0–0)
P AXIS: -8 DEGREES
P AXIS: 29 DEGREES
P OFFSET: 180 MS
P ONSET: 132 MS
PLATELET # BLD AUTO: 154 X10*3/UL (ref 150–450)
POTASSIUM SERPL-SCNC: 4 MMOL/L (ref 3.5–5.3)
POTASSIUM SERPL-SCNC: 4.2 MMOL/L (ref 3.5–5.3)
PR INTERVAL: 152 MS
PR INTERVAL: 226 MS
Q ONSET: 208 MS
Q ONSET: 210 MS
QRS COUNT: 10 BEATS
QRS COUNT: 10 BEATS
QRS DURATION: 124 MS
QRS DURATION: 166 MS
QT INTERVAL: 454 MS
QT INTERVAL: 478 MS
QTC CALCULATION(BAZETT): 454 MS
QTC CALCULATION(BAZETT): 508 MS
QTC FREDERICIA: 454 MS
QTC FREDERICIA: 498 MS
R AXIS: 46 DEGREES
R AXIS: 9 DEGREES
RBC # BLD AUTO: 4.52 X10*6/UL (ref 4.5–5.9)
SODIUM SERPL-SCNC: 142 MMOL/L (ref 136–145)
T AXIS: -51 DEGREES
T AXIS: 203 DEGREES
T OFFSET: 435 MS
T OFFSET: 449 MS
TRIGL SERPL-MCNC: 97 MG/DL (ref 0–149)
UFH PPP CHRO-ACNC: 0.6 IU/ML
UFH PPP CHRO-ACNC: 0.6 IU/ML
VENTRICULAR RATE: 60 BPM
VENTRICULAR RATE: 68 BPM
VLDL: 19 MG/DL (ref 0–40)
WBC # BLD AUTO: 7.3 X10*3/UL (ref 4.4–11.3)

## 2024-09-06 PROCEDURE — 93458 L HRT ARTERY/VENTRICLE ANGIO: CPT | Performed by: INTERNAL MEDICINE

## 2024-09-06 PROCEDURE — 2060000001 HC INTERMEDIATE ICU ROOM DAILY

## 2024-09-06 PROCEDURE — C1725 CATH, TRANSLUMIN NON-LASER: HCPCS | Performed by: INTERNAL MEDICINE

## 2024-09-06 PROCEDURE — 93010 ELECTROCARDIOGRAM REPORT: CPT | Performed by: INTERNAL MEDICINE

## 2024-09-06 PROCEDURE — 93005 ELECTROCARDIOGRAM TRACING: CPT

## 2024-09-06 PROCEDURE — 83718 ASSAY OF LIPOPROTEIN: CPT | Performed by: INTERNAL MEDICINE

## 2024-09-06 PROCEDURE — 2720000007 HC OR 272 NO HCPCS: Performed by: INTERNAL MEDICINE

## 2024-09-06 PROCEDURE — 99152 MOD SED SAME PHYS/QHP 5/>YRS: CPT | Performed by: INTERNAL MEDICINE

## 2024-09-06 PROCEDURE — 85520 HEPARIN ASSAY: CPT | Performed by: INTERNAL MEDICINE

## 2024-09-06 PROCEDURE — 2500000004 HC RX 250 GENERAL PHARMACY W/ HCPCS (ALT 636 FOR OP/ED): Performed by: NURSE PRACTITIONER

## 2024-09-06 PROCEDURE — C1769 GUIDE WIRE: HCPCS | Performed by: INTERNAL MEDICINE

## 2024-09-06 PROCEDURE — 92928 PRQ TCAT PLMT NTRAC ST 1 LES: CPT | Performed by: INTERNAL MEDICINE

## 2024-09-06 PROCEDURE — 2500000005 HC RX 250 GENERAL PHARMACY W/O HCPCS: Performed by: INTERNAL MEDICINE

## 2024-09-06 PROCEDURE — 7100000001 HC RECOVERY ROOM TIME - INITIAL BASE CHARGE: Performed by: INTERNAL MEDICINE

## 2024-09-06 PROCEDURE — 99153 MOD SED SAME PHYS/QHP EA: CPT | Performed by: INTERNAL MEDICINE

## 2024-09-06 PROCEDURE — 85027 COMPLETE CBC AUTOMATED: CPT | Performed by: INTERNAL MEDICINE

## 2024-09-06 PROCEDURE — 027034Z DILATION OF CORONARY ARTERY, ONE ARTERY WITH DRUG-ELUTING INTRALUMINAL DEVICE, PERCUTANEOUS APPROACH: ICD-10-PCS | Performed by: INTERNAL MEDICINE

## 2024-09-06 PROCEDURE — 84484 ASSAY OF TROPONIN QUANT: CPT | Performed by: NURSE PRACTITIONER

## 2024-09-06 PROCEDURE — C1874 STENT, COATED/COV W/DEL SYS: HCPCS | Performed by: INTERNAL MEDICINE

## 2024-09-06 PROCEDURE — 85347 COAGULATION TIME ACTIVATED: CPT | Performed by: INTERNAL MEDICINE

## 2024-09-06 PROCEDURE — B2151ZZ FLUOROSCOPY OF LEFT HEART USING LOW OSMOLAR CONTRAST: ICD-10-PCS | Performed by: INTERNAL MEDICINE

## 2024-09-06 PROCEDURE — 84132 ASSAY OF SERUM POTASSIUM: CPT | Performed by: INTERNAL MEDICINE

## 2024-09-06 PROCEDURE — 99223 1ST HOSP IP/OBS HIGH 75: CPT | Performed by: INTERNAL MEDICINE

## 2024-09-06 PROCEDURE — 99232 SBSQ HOSP IP/OBS MODERATE 35: CPT | Performed by: INTERNAL MEDICINE

## 2024-09-06 PROCEDURE — 2500000002 HC RX 250 W HCPCS SELF ADMINISTERED DRUGS (ALT 637 FOR MEDICARE OP, ALT 636 FOR OP/ED): Performed by: INTERNAL MEDICINE

## 2024-09-06 PROCEDURE — B2111ZZ FLUOROSCOPY OF MULTIPLE CORONARY ARTERIES USING LOW OSMOLAR CONTRAST: ICD-10-PCS | Performed by: INTERNAL MEDICINE

## 2024-09-06 PROCEDURE — C1887 CATHETER, GUIDING: HCPCS | Performed by: INTERNAL MEDICINE

## 2024-09-06 PROCEDURE — 2500000001 HC RX 250 WO HCPCS SELF ADMINISTERED DRUGS (ALT 637 FOR MEDICARE OP): Performed by: NURSE PRACTITIONER

## 2024-09-06 PROCEDURE — 36415 COLL VENOUS BLD VENIPUNCTURE: CPT | Performed by: INTERNAL MEDICINE

## 2024-09-06 PROCEDURE — 4B02XTZ MEASUREMENT OF CARDIAC DEFIBRILLATOR, EXTERNAL APPROACH: ICD-10-PCS | Performed by: INTERNAL MEDICINE

## 2024-09-06 PROCEDURE — 2500000004 HC RX 250 GENERAL PHARMACY W/ HCPCS (ALT 636 FOR OP/ED): Performed by: INTERNAL MEDICINE

## 2024-09-06 PROCEDURE — 84132 ASSAY OF SERUM POTASSIUM: CPT | Performed by: NURSE PRACTITIONER

## 2024-09-06 PROCEDURE — 99024 POSTOP FOLLOW-UP VISIT: CPT | Performed by: NURSE PRACTITIONER

## 2024-09-06 PROCEDURE — C9600 PERC DRUG-EL COR STENT SING: HCPCS | Mod: RC | Performed by: INTERNAL MEDICINE

## 2024-09-06 PROCEDURE — 7100000002 HC RECOVERY ROOM TIME - EACH INCREMENTAL 1 MINUTE: Performed by: INTERNAL MEDICINE

## 2024-09-06 PROCEDURE — 93284 PRGRMG EVAL IMPLANTABLE DFB: CPT | Performed by: INTERNAL MEDICINE

## 2024-09-06 PROCEDURE — 36415 COLL VENOUS BLD VENIPUNCTURE: CPT | Performed by: NURSE PRACTITIONER

## 2024-09-06 PROCEDURE — 4A023N7 MEASUREMENT OF CARDIAC SAMPLING AND PRESSURE, LEFT HEART, PERCUTANEOUS APPROACH: ICD-10-PCS | Performed by: INTERNAL MEDICINE

## 2024-09-06 PROCEDURE — 2550000001 HC RX 255 CONTRASTS: Performed by: INTERNAL MEDICINE

## 2024-09-06 PROCEDURE — 93284 PRGRMG EVAL IMPLANTABLE DFB: CPT

## 2024-09-06 PROCEDURE — 2500000002 HC RX 250 W HCPCS SELF ADMINISTERED DRUGS (ALT 637 FOR MEDICARE OP, ALT 636 FOR OP/ED): Performed by: NURSE PRACTITIONER

## 2024-09-06 PROCEDURE — 84484 ASSAY OF TROPONIN QUANT: CPT | Performed by: INTERNAL MEDICINE

## 2024-09-06 PROCEDURE — 83735 ASSAY OF MAGNESIUM: CPT | Performed by: NURSE PRACTITIONER

## 2024-09-06 PROCEDURE — 7100000010 HC PHASE TWO TIME - EACH INCREMENTAL 1 MINUTE: Performed by: INTERNAL MEDICINE

## 2024-09-06 PROCEDURE — 2500000001 HC RX 250 WO HCPCS SELF ADMINISTERED DRUGS (ALT 637 FOR MEDICARE OP): Performed by: INTERNAL MEDICINE

## 2024-09-06 PROCEDURE — RXMED WILLOW AMBULATORY MEDICATION CHARGE

## 2024-09-06 PROCEDURE — 85347 COAGULATION TIME ACTIVATED: CPT

## 2024-09-06 PROCEDURE — 2780000003 HC OR 278 NO HCPCS: Performed by: INTERNAL MEDICINE

## 2024-09-06 PROCEDURE — C1894 INTRO/SHEATH, NON-LASER: HCPCS | Performed by: INTERNAL MEDICINE

## 2024-09-06 PROCEDURE — 93458 L HRT ARTERY/VENTRICLE ANGIO: CPT | Mod: 59 | Performed by: INTERNAL MEDICINE

## 2024-09-06 PROCEDURE — 7100000009 HC PHASE TWO TIME - INITIAL BASE CHARGE: Performed by: INTERNAL MEDICINE

## 2024-09-06 DEVICE — STENT ONYXNG30022UX ONYX 3.00X22RX
Type: IMPLANTABLE DEVICE | Site: CORONARY | Status: FUNCTIONAL
Brand: ONYX FRONTIER™

## 2024-09-06 RX ORDER — NITROGLYCERIN 0.4 MG/1
0.4 TABLET SUBLINGUAL EVERY 5 MIN PRN
Qty: 90 TABLET | Refills: 12 | Status: SHIPPED | OUTPATIENT
Start: 2024-09-06 | End: 2024-09-06

## 2024-09-06 RX ORDER — ATORVASTATIN CALCIUM 40 MG/1
40 TABLET, FILM COATED ORAL NIGHTLY
Qty: 30 TABLET | Refills: 5 | Status: SHIPPED | OUTPATIENT
Start: 2024-09-06

## 2024-09-06 RX ORDER — NITROGLYCERIN 0.4 MG/1
0.4 TABLET SUBLINGUAL EVERY 5 MIN PRN
Status: DISCONTINUED | OUTPATIENT
Start: 2024-09-06 | End: 2024-09-07 | Stop reason: HOSPADM

## 2024-09-06 RX ORDER — FENTANYL CITRATE 50 UG/ML
INJECTION, SOLUTION INTRAMUSCULAR; INTRAVENOUS AS NEEDED
Status: DISCONTINUED | OUTPATIENT
Start: 2024-09-06 | End: 2024-09-06 | Stop reason: HOSPADM

## 2024-09-06 RX ORDER — MORPHINE SULFATE 2 MG/ML
2 INJECTION, SOLUTION INTRAMUSCULAR; INTRAVENOUS
Status: DISCONTINUED | OUTPATIENT
Start: 2024-09-06 | End: 2024-09-07 | Stop reason: HOSPADM

## 2024-09-06 RX ORDER — TAMSULOSIN HYDROCHLORIDE 0.4 MG/1
0.4 CAPSULE ORAL 2 TIMES DAILY
Status: DISCONTINUED | OUTPATIENT
Start: 2024-09-06 | End: 2024-09-07 | Stop reason: HOSPADM

## 2024-09-06 RX ORDER — IPRATROPIUM BROMIDE AND ALBUTEROL SULFATE 2.5; .5 MG/3ML; MG/3ML
3 SOLUTION RESPIRATORY (INHALATION) EVERY 4 HOURS PRN
Status: DISCONTINUED | OUTPATIENT
Start: 2024-09-06 | End: 2024-09-07 | Stop reason: HOSPADM

## 2024-09-06 RX ORDER — NITROGLYCERIN 0.4 MG/1
0.4 TABLET SUBLINGUAL EVERY 5 MIN PRN
Status: CANCELLED | OUTPATIENT
Start: 2024-09-06

## 2024-09-06 RX ORDER — SODIUM CHLORIDE 9 MG/ML
50 INJECTION, SOLUTION INTRAVENOUS CONTINUOUS
Status: ACTIVE | OUTPATIENT
Start: 2024-09-06 | End: 2024-09-06

## 2024-09-06 RX ORDER — NAPROXEN SODIUM 220 MG/1
81 TABLET, FILM COATED ORAL DAILY
Qty: 7 TABLET | Refills: 0 | Status: SHIPPED | OUTPATIENT
Start: 2024-09-06 | End: 2024-09-13

## 2024-09-06 RX ORDER — LIDOCAINE HYDROCHLORIDE 20 MG/ML
INJECTION, SOLUTION INFILTRATION; PERINEURAL AS NEEDED
Status: DISCONTINUED | OUTPATIENT
Start: 2024-09-06 | End: 2024-09-06 | Stop reason: HOSPADM

## 2024-09-06 RX ORDER — NITROGLYCERIN 40 MG/100ML
INJECTION INTRAVENOUS AS NEEDED
Status: DISCONTINUED | OUTPATIENT
Start: 2024-09-06 | End: 2024-09-06 | Stop reason: HOSPADM

## 2024-09-06 RX ORDER — HEPARIN SODIUM 1000 [USP'U]/ML
INJECTION, SOLUTION INTRAVENOUS; SUBCUTANEOUS AS NEEDED
Status: DISCONTINUED | OUTPATIENT
Start: 2024-09-06 | End: 2024-09-06 | Stop reason: HOSPADM

## 2024-09-06 RX ORDER — NITROGLYCERIN 0.4 MG/1
0.4 TABLET SUBLINGUAL EVERY 5 MIN PRN
Qty: 25 TABLET | Refills: 3 | Status: SHIPPED | OUTPATIENT
Start: 2024-09-06

## 2024-09-06 RX ORDER — SODIUM CHLORIDE 9 MG/ML
50 INJECTION, SOLUTION INTRAVENOUS CONTINUOUS
Status: CANCELLED | OUTPATIENT
Start: 2024-09-06 | End: 2024-09-06

## 2024-09-06 RX ORDER — ATORVASTATIN CALCIUM 20 MG/1
40 TABLET, FILM COATED ORAL NIGHTLY
Status: DISCONTINUED | OUTPATIENT
Start: 2024-09-06 | End: 2024-09-07 | Stop reason: HOSPADM

## 2024-09-06 RX ORDER — MORPHINE SULFATE 2 MG/ML
2 INJECTION, SOLUTION INTRAMUSCULAR; INTRAVENOUS
Status: CANCELLED | OUTPATIENT
Start: 2024-09-06

## 2024-09-06 RX ORDER — MIDAZOLAM HYDROCHLORIDE 1 MG/ML
INJECTION, SOLUTION INTRAMUSCULAR; INTRAVENOUS AS NEEDED
Status: DISCONTINUED | OUTPATIENT
Start: 2024-09-06 | End: 2024-09-06 | Stop reason: HOSPADM

## 2024-09-06 ASSESSMENT — COGNITIVE AND FUNCTIONAL STATUS - GENERAL
MOBILITY SCORE: 23
CLIMB 3 TO 5 STEPS WITH RAILING: A LITTLE
DAILY ACTIVITIY SCORE: 24

## 2024-09-06 ASSESSMENT — PAIN SCALES - GENERAL
PAINLEVEL_OUTOF10: 0 - NO PAIN

## 2024-09-06 NOTE — NURSING NOTE
Patient is being transferred back to floor.  When I went to call report to 10S I was informed that patient was not going back to 10S but was in fact going to 8S post recovery.  Report called to Alyssa LINK on 8S and patient placed in Patient Movement for transport to floor.  On transfer, Right Radial arterial sheath puncture is stable.  Dressing is clean/dry/intact; site is soft with no oozing or hematoma.

## 2024-09-06 NOTE — DISCHARGE INSTRUCTIONS
CARDIAC CATHETERIZATION DISCHARGE INSTRUCTIONS     FOR SUDDEN AND SEVERE CHEST PAIN, SHORTNESS OF BREATH, EXCESSIVE BLEEDING, SIGNS OF STROKE, OR CHANGES IN MENTAL STATUS YOU SHOULD CALL 911 IMMEDIATELY.     If your provider has prescribed aspirin and/or clopidogrel (Plavix), or prasugrel (Effient), or ticagrelor (Brilinta), DO NOT STOP THESE MEDICATIONS for any reason without talking to your cardiologist first. If any of these were prescribed, you must take them every day without missing a single dose. If you are getting low on these medications, contact your provider immediately for a refill.     FOR NEXT 24 HOURS  - Upon discharge, you should return home and rest for the remainder of the day and evening. You do not have to stay on bed rest but should not be very active.  It is recommended a responsible adult be with you for the first 24 hours after the procedure.    - No driving for 24 hours after procedure. Please arrange for someone to drive you home from the hospital today.     - Do not operate machinery or use power tools for 24 hours after your procedure.     - Do not make any legal decisions for 24 hours after your procedure.     - Do not drink alcoholic beverages for 24 hours after your procedure.    WOUND CARE    *FOR RADIAL (WRIST) ACCESS*  ·      No lifting more than 5 pounds or excessive use of the wrist for 24 hours - for example, treat your wrist as if it is sprained.  ·      Do not engage in vigorous activities (tennis, golf, bowling, weights) for at least 48 hours after the procedure.  ·      Do not submerge the wrist for 7 days after the procedure.  ·      You should expect mild tingling in your hand and tenderness at the puncture site for up to 3 days.    - The transparent dressing should be removed from the site 24 hours after the procedure.   Dressing can be removed 9/7/24 any time after 1:00 pm    - You may shower the day after your procedure. Wash the site gently with soap and water.  Rinse well and pat dry. Keep the area clean and dry. You may apply a Band-Aid to the site. Avoid lotions, ointments, or powders until fully healed.     - It is normal to notice a small bruise around the puncture site and/or a small grape sized or smaller lump. Any large bruising or large lump warrants a call to the office.     - If bleeding should occur, lay down and apply pressure to the affected area for 10 minutes.  If the bleeding stops notify your physician.  If there is a large amount of bleeding or spurting of blood CALL 911 immediately.  DO NOT drive yourself to the hospital.    - You may experience some tenderness, bruising or minimal inflammation.  If you have any concerns or if any of these symptoms become excessive, contact your cardiologist or go to the emergency room.     OTHER INSTRUCTIONS    - You may take acetaminophen (Tylenol) as directed for discomfort.  If pain is not relieved with acetaminophen (Tylenol), contact your doctor.    - If you notice or experience any of the following, you should notify your doctor or seek medical attention  Chest pain or discomfort  Change in mental status or weakness in extremities.  Dizziness, light headedness, or feeling faint.  Change in the site where the procedure was performed, such as bleeding or an increased area of bruising or swelling.  Tingling, numbness, pain, or coolness in the leg/arm beyond the site where the procedure was performed.  Signs of infection (i.e. shaking chills, temperature > 100 degrees Fahrenheit, warmth, redness) in the leg/arm area where the procedure was performed.  Changes in urination   Bloody or black stools  Vomiting blood  Severe nose bleeds  Any excessive bleeding    - If you DO NOT have an appointment with your cardiologist within 2-4 weeks following your procedure, please contact their office.

## 2024-09-06 NOTE — PROGRESS NOTES
Patient is stable status post LHC/PCI under the care of Dr. Kent.  Discussed results of procedure with patient and his wife.  Pictures provided.  Findings of the LHC revealed severe 95% stenosis in the mid RCA, mild disease of the left coronary system and an LVEF of 35%.  Patient underwent successful PCI with 1 NINFA placed to the mid RCA reducing that lesion down to 0% stenosis.  Patient was initiated on Brilinta 90 mg twice daily.  He will resume Eliquis 5 mg twice daily starting 9/7/2024.  He was instructed to take aspirin 81 mg x 1 week only while on both Brilinta and Eliquis.  He was initiated on high intensity statin therapy.  He will be monitored overnight with possible discharge on 9/7/2024 barring no complications.  Outpatient follow-up with cardiology has been arranged.  Postprocedural activity, restrictions, potential complications, medications and future follow-up discussed at length.  Multiple questions answered.  Both verbalized understanding.

## 2024-09-06 NOTE — PROGRESS NOTES
Alex Alba is a 69 y.o. male on day 1 of admission presenting with NSTEMI (non-ST elevated myocardial infarction) (Multi).      Subjective   The patient is seen and evaluated this AM.  The patient had some chest discomfort overnight but denies any chest pain at this time.  Denies any shortness of breath dizziness or lightheadedness.  His troponin did go up to 14,000.  Planning for cardiac cath later today.  The patient otherwise denies any other complaints.       Objective     Last Recorded Vitals  /74   Pulse 60   Temp 36.7 °C (98.1 °F)   Resp 14   Wt 110 kg (242 lb 11.6 oz)   SpO2 99%   Intake/Output last 3 Shifts:    Intake/Output Summary (Last 24 hours) at 9/6/2024 1238  Last data filed at 9/6/2024 1000  Gross per 24 hour   Intake 501.4 ml   Output 400 ml   Net 101.4 ml       Admission Weight  Weight: 111 kg (245 lb) (09/05/24 1254)    Daily Weight  09/05/24 : 110 kg (242 lb 11.6 oz)    Image Results  ECG 12 lead  AV dual-paced rhythm with prolonged AV conduction with occasional Premature ventricular complexes  Biventricular pacemaker detected  Abnormal ECG  When compared with ECG of 05-SEP-2024 15:10,  Electronic ventricular pacemaker has replaced Sinus rhythm      Physical Exam  Constitutional:       Appearance: Normal appearance.   HENT:      Head: Normocephalic and atraumatic.      Nose: Nose normal.      Mouth/Throat:      Mouth: Mucous membranes are dry.   Eyes:      Extraocular Movements: Extraocular movements intact.      Conjunctiva/sclera: Conjunctivae normal.   Cardiovascular:      Rate and Rhythm: Normal rate and regular rhythm.      Pulses: Normal pulses.      Heart sounds: Normal heart sounds.   Pulmonary:      Effort: Pulmonary effort is normal.      Breath sounds: Normal breath sounds.   Abdominal:      General: Bowel sounds are normal.      Palpations: Abdomen is soft.   Musculoskeletal:         General: Normal range of motion.      Cervical back: Normal range of motion and  neck supple.   Skin:     General: Skin is warm and dry.   Neurological:      General: No focal deficit present.      Mental Status: He is alert and oriented to person, place, and time. Mental status is at baseline.   Psychiatric:         Mood and Affect: Mood normal.         Behavior: Behavior normal.         Thought Content: Thought content normal.         Judgment: Judgment normal.         Relevant Results               Assessment/Plan          Alex Alba is a 69 y.o. male with past medical history of nonischemic cardiomyopathy, status post ICD placement, history of LVEF of 15 to 20% later recovered to 45 to 50% with biventricular pacing and optimal heart therapy, hypertension, hyperlipidemia comes into the hospital due to chest pain.         Assessment & Plan  NSTEMI (non-ST elevated myocardial infarction) (Multi)    Cardiomyopathy (Multi)    CHF (NYHA class III, ACC/AHA stage C) (Multi)    NSTEMI  -EKG without any significant changes.  Continue with serial EKGs  -Troponins trending up initial troponin was 12 subsequent troponin 217 > 14K.  -Continue with heparin drip.  Continue with aspirin statin beta-blocker and currently on Brilinta.  -Cardiology has been consulted plans for cardiac cath today.      Nonischemic cardiomyopathy status post biventricular defibrillator  Chronic systolic congestive heart failure  -Continue with GDMT once able.   -Eliquis on hold currently. Pending cardiac cath.      Hypertension  Hyperlipidemia  -Continue with home medications once able.      VTE prophylaxis-heparin drip              Jeimy Manjarrez MD

## 2024-09-06 NOTE — NURSING NOTE
D-Stat Band removed.  Right Radial arterial sheath puncture site is stable.  Site is soft with no oozing or hematoma noted. Dressing applied and is clean/dry/intact. Educated started with patient with regards to restrictions, site care and cardiac rehab. Stent card given to patient.

## 2024-09-06 NOTE — NURSING NOTE
Provided patient with first 30 day supply of brilinta. Educated patient on dosing instructions and answered questions. Next fill $12.83. RN Navigator contact info provided.

## 2024-09-06 NOTE — CONSULTS
Inpatient consult to Cardiology  Consult performed by: SELENA Vazquez-CNP  Consult ordered by: Jeimy Manjarrez MD  Reason for consult: chest pain                                                          Date:   9/6/2024  Patient name:  Alex Alba  Date of admission:  9/5/2024 12:43 PM  MRN:   [id number]  YOB: 1955  Time of Consult:  10:34 AM    Consulting Cardiologist: SELENA Caballero, CNP  Primary Cardiologist:  Dr. Dale Ellington    Referring Provider: Dr. Manjarrez      Admission Diagnosis:     NSTEMI (non-ST elevated myocardial infarction) (Multi)      History of Present Illness:      69-year-old male with past medical history of nonischemic cardiomyopathy with last EF noted to be around 45 to 50%, previous EF was 15 to 20%, AICD with biventricular pacing, hypertension, hyperlipidemia who presented to [facility] emergency department with complaints of chest pain that started yesterday morning.  The patient reports that he had chest pressure that was midsternal in nature and associated diaphoresis.  He initially had a previous episode when he was eating spaghetti and thought it was acid reflux.  The pain was pretty bad yesterday so he decided to come to the emergency department after calling 911.  He received aspirin and nitro en route with EMS and had significant relief.  By the time he came to the emergency department his pain had subsided although last night he had another episode of brief chest pain that came and went by itself.  Initial troponin was 12, up to 217 and up to 14,000 this morning.  The patient was started on a heparin infusion along with Brilinta.  Chest x-ray was unimpressive.  Lab work essentially unremarkable besides elevated troponin.  Initial EKG showed wide QRS rhythm with left bundle branch block.  Repeat EKG showed AV dual paced rhythm with prolonged AV conduction with occasional PVCs.  Nonspecific ST  wave abnormality no STEMI criteria.  The patient was admitted to the medicine team and general cardiology was consulted for chest pain.      Allergies:     No Known Allergies      Past Medical History:     Past Medical History:   Diagnosis Date    Acute maxillary sinusitis, unspecified 07/27/2020    Subacute maxillary sinusitis    Cellulitis of right finger 06/03/2019    Paronychia of finger of right hand    CHF (congestive heart failure) (Multi)     Encounter for immunization     Encounter for immunization    Encounter for screening for lipoid disorders 12/03/2019    Screening, lipid    Encounter for screening for malignant neoplasm of colon 05/13/2021    Encounter for screening for colorectal malignant neoplasm    Encounter for screening for malignant neoplasm of prostate 12/03/2019    Screening PSA (prostate specific antigen)    Hypertension     Impingement syndrome of left shoulder     Impingement syndrome of left shoulder    Personal history of other diseases of the musculoskeletal system and connective tissue     History of arthritis    Personal history of other drug therapy 05/28/2020    History of pneumococcal vaccination    Personal history of other specified conditions 08/29/2019    History of fatigue       Past Surgical History:     Past Surgical History:   Procedure Laterality Date    CARDIAC DEFIBRILLATOR PLACEMENT      OTHER SURGICAL HISTORY  10/19/2021    Prostate biopsy    OTHER SURGICAL HISTORY  06/15/2021    Hernia repair    OTHER SURGICAL HISTORY  06/15/2021    Colonoscopy       Family History:     Family History   Problem Relation Name Age of Onset    Diabetes type II Mother      Hypertension Mother      Diabetes Mother      Other (CEREBRALVASCULAR ACCIDENT) Mother      Hypertension Father      Other (URINARY BLADDER CA) Father         Social History:     Social History     Tobacco Use    Smoking status: Never     Passive exposure: Never    Smokeless tobacco: Never   Substance Use Topics     Alcohol use: Never    Drug use: Never       CURRENT INPATIENT MEDICATIONS    empagliflozin, 10 mg, oral, Daily  metoprolol succinate XL, 25 mg, oral, Daily  oxygen, , inhalation, Continuous - Inhalation  sacubitriL-valsartan, 1 tablet, oral, BID  spironolactone, 12.5 mg, oral, Every other day  tamsulosin, 0.4 mg, oral, BID  ticagrelor, 90 mg, oral, BID      heparin, 0-4,000 Units/hr, Last Rate: 800 Units/hr (09/05/24 2312)      Current Outpatient Medications   Medication Instructions    acetaminophen (TYLENOL) 500 mg, oral, Every 6 hours PRN    apixaban (Eliquis) 5 mg tablet TAKE 1 TABLET BY MOUTH TWO TIMES A DAY    empagliflozin (JARDIANCE) 10 mg, oral, Daily    Entresto 24-26 mg tablet 1 tablet, oral, 2 times daily    metoprolol succinate XL (TOPROL-XL) 25 mg, oral, Daily, Do not crush or chew.    spironolactone (ALDACTONE) 12.5 mg, oral, Every other day    tamsulosin (FLOMAX) 0.8 mg, oral, 2 times daily        Review of Systems:      12 point review of systems was obtained in detail and is negative other than that detailed above.    Vital Signs:     Vitals:    09/05/24 1932 09/06/24 0023 09/06/24 0355 09/06/24 0759   BP: 110/61 129/68 125/68 119/67   BP Location: Right arm Right arm Right arm    Patient Position: Lying Lying Lying    Pulse: 60 60 62 59   Resp: 16  16    Temp: 36.7 °C (98.1 °F) 37 °C (98.6 °F) 36.3 °C (97.3 °F) 36.7 °C (98.1 °F)   TempSrc: Temporal Temporal Temporal    SpO2: 93% 94% 92% 95%   Weight:       Height:           Intake/Output Summary (Last 24 hours) at 9/6/2024 1034  Last data filed at 9/6/2024 0900  Gross per 24 hour   Intake 469.4 ml   Output 400 ml   Net 69.4 ml       Wt Readings from Last 4 Encounters:   09/05/24 110 kg (242 lb 11.6 oz)   08/13/24 113 kg (248 lb 9.6 oz)   07/12/24 113 kg (250 lb)   06/03/24 113 kg (249 lb 3.2 oz)       Physical Examination:     Physical Exam  Vitals and nursing note reviewed.   HENT:      Head: Normocephalic.      Mouth/Throat:      Mouth: Mucous  membranes are moist.   Eyes:      Pupils: Pupils are equal, round, and reactive to light.   Cardiovascular:      Rate and Rhythm: Normal rate and regular rhythm.      Comments: PAced  Pulmonary:      Effort: Pulmonary effort is normal.   Abdominal:      Palpations: Abdomen is soft.   Musculoskeletal:         General: Normal range of motion.   Skin:     General: Skin is warm and dry.      Capillary Refill: Capillary refill takes less than 2 seconds.   Neurological:      General: No focal deficit present.      Mental Status: He is alert and oriented to person, place, and time. Mental status is at baseline.   Psychiatric:         Mood and Affect: Mood normal.           Lab:     CBC:   Results from last 7 days   Lab Units 09/06/24 0307 09/05/24  1310   WBC AUTO x10*3/uL 7.3 10.4   RBC AUTO x10*6/uL 4.52 4.81   HEMOGLOBIN g/dL 14.1 15.3   HEMATOCRIT % 42.8 46.3   MCV fL 95 96   MCH pg 31.2 31.8   MCHC g/dL 32.9 33.0   RDW % 13.2 13.0   PLATELETS AUTO x10*3/uL 154 156     CMP:    Results from last 7 days   Lab Units 09/06/24 0307 09/06/24 0112 09/05/24  1310   SODIUM mmol/L 142  --  143   POTASSIUM mmol/L 4.2 4.0 4.3   CHLORIDE mmol/L 111*  --  111*   CO2 mmol/L 26  --  24   BUN mg/dL 24*  --  23   CREATININE mg/dL 1.15  --  1.15   GLUCOSE mg/dL 119*  --  146*   PROTEIN TOTAL g/dL  --   --  6.3*   CALCIUM mg/dL 8.7  --  9.2   BILIRUBIN TOTAL mg/dL  --   --  0.7   ALK PHOS U/L  --   --  58   AST U/L  --   --  10   ALT U/L  --   --  8*     BMP:    Results from last 7 days   Lab Units 09/06/24 0307 09/06/24 0112 09/05/24  1310   SODIUM mmol/L 142  --  143   POTASSIUM mmol/L 4.2 4.0 4.3   CHLORIDE mmol/L 111*  --  111*   CO2 mmol/L 26  --  24   BUN mg/dL 24*  --  23   CREATININE mg/dL 1.15  --  1.15   CALCIUM mg/dL 8.7  --  9.2   GLUCOSE mg/dL 119*  --  146*     Magnesium:  Results from last 7 days   Lab Units 09/06/24  0112 09/05/24  1310   MAGNESIUM mg/dL 1.96 1.91     Troponin:    Results from last 7 days   Lab Units  09/06/24  0112 09/05/24  1506 09/05/24  1310   TROPHS ng/L 14,127* 217* 12     BNP:   Results from last 7 days   Lab Units 09/05/24  1310   BNP pg/mL 115*     Lipid Panel:  Results from last 7 days   Lab Units 09/06/24  0307   HDL mg/dL 36.9   CHOLESTEROL/HDL RATIO  4.2   VLDL mg/dL 19   TRIGLYCERIDES mg/dL 97   NON HDL CHOL. mg/dL 119        Diagnostic Studies:    [address], [address], [place] [address]           Tel [phone] Fax [phone]     TRANSTHORACIC ECHOCARDIOGRAM REPORT        Patient Name:      MARIA LUISA CUNHA  Reading Physician:    [id number] Ellington MD  Study Date:        4/3/2024             Ordering Provider:    [id number] ELLINGTON  MRN/PID:           [id number]             Fellow:  Accession#:        [id number]         Nurse:  Date of Birth/Age: 1955 / 69 years Sonographer:          Terrance Tovar  Gender:            M                    Additional Staff:  Height:            190.50 cm            Admit Date:           4/3/2024  Weight:            111.13 kg            Admission Status:     Outpatient  BSA / BMI:         2.39 m2 / 30.62      Department Location:  [facility]                     kg/m2                                      [facility]  Blood Pressure: 110 /60 mmHg     Study Type:    TRANSTHORACIC ECHO (TTE) COMPLETE  Diagnosis/ICD: Heart failure, unspecified-I50.9; Other cardiomyopathies-I42.8  Indication:    CHF, LV Non compaction  CPT Codes:     Echo Complete w Full [id number]     Patient History:  Pertinent History: Abn. EKG, CHF, LV Noncompaction, LBBB, Cardiomyopathy,                     Cardiac Mass, SoB.     Study Detail: The following Echo studies were performed: 2D, M-Mode, Doppler and                color flow. Optison used as a contrast agent for endocardial                border  definition. The patient was awake.        PHYSICIAN INTERPRETATION:  Left Ventricle: Left ventricular systolic function is mildly decreased, with an estimated ejection fraction of 45-50%. There is global hypokinesis of the left ventricle with minor regional variations. The left ventricular cavity size is normal. Spectral Doppler shows an impaired relaxation pattern of left ventricular diastolic filling.  Left Atrium: The left atrium is mildly dilated.  Right Ventricle: The right ventricle is normal in size. There is normal right ventricular global systolic function.  Right Atrium: The right atrium is normal in size.  Aortic Valve: The aortic valve is trileaflet. There is no evidence of aortic valve regurgitation. The peak instantaneous gradient of the aortic valve is 6.9 mmHg. The mean gradient of the aortic valve is 4.0 mmHg.  Mitral Valve: The mitral valve is mildly thickened. There is trace mitral valve regurgitation.  Tricuspid Valve: The tricuspid valve was not well visualized. There is trace to mild tricuspid regurgitation.  Pulmonic Valve: The pulmonic valve is not well visualized. There is trace pulmonic valve regurgitation.  Pericardium: There is a trivial to small pericardial effusion.  Aorta: The aortic root is normal.        CONCLUSIONS:   1. Left ventricular systolic function is mildly decreased with a 45-50% estimated ejection fraction.   2. Spectral Doppler shows an impaired relaxation pattern of left ventricular diastolic filling.   3. When c/t prior study from 5/2023, the EF has improved significantly from 15-20%.   4. Calcified linear echodensity seen in the LV apex - likely secondary ro ectopic myocardial cl=alcification.   5. There is global hypokinesis of the left ventricle with minor regional variations.     QUANTITATIVE DATA SUMMARY:  2D MEASUREMENTS:                           Normal Ranges:  Ao Root d:     3.70 cm   (2.0-3.7cm)  LAs:           3.10 cm   (2.7-4.0cm)  RVIDd:         3.60 cm    (0.9-3.6cm)  IVSd:          1.10 cm   (0.6-1.1cm)  LVPWd:         1.10 cm   (0.6-1.1cm)  LVIDd:         3.70 cm   (3.9-5.9cm)  LVIDs:         3.40 cm  LV Mass Index: 54.1 g/m2  LV % FS        8.1 %     LA VOLUME:                        Normal Ranges:  LA Area A4C: 15.1 cm2  LA Area A2C: 22.0 cm2  LA Vol A4C:  39.0 ml  LA Vol A2C:  73.0 ml  LA Vol BP:   56.0 ml     AORTA MEASUREMENTS:                     Normal Ranges:  Asc Ao, d: 3.60 cm (2.1-3.4cm)     LV SYSTOLIC FUNCTION BY 2D PLANIMETRY (MOD):                      Normal Ranges:  EF-A4C View: 40.6 % (>=55%)  EF-A2C View: 46.9 %  EF-Biplane:  41.6 %     LV DIASTOLIC FUNCTION:                            Normal Ranges:  MV Peak E:    0.68 m/s    (0.7-1.2 m/s)  MV Peak A:    0.62 m/s    (0.42-0.7 m/s)  E/A Ratio:    1.09        (1.0-2.2)  MV lateral e' 0.10 m/s  MV medial e'  0.04 m/s  MV A Dur:     239.00 msec  E/e' Ratio:   7.00        (<8.0)     MITRAL VALVE:                       Normal Ranges:  MV Vmax:    0.76 m/s (<=1.3m/s)  MV peak P.3 mmHg (<5mmHg)  MV mean P.0 mmHg (<48mmHg)  MV DT:      236 msec (150-240msec)     AORTIC VALVE:                                    Normal Ranges:  AoV Vmax:                1.31 m/s (<=1.7m/s)  AoV Peak P.9 mmHg (<20mmHg)  AoV Mean P.0 mmHg (1.7-11.5mmHg)  LVOT Max Bartolo:            1.02 m/s (<=1.1m/s)  AoV VTI:                 25.00 cm (18-25cm)  LVOT VTI:                19.70 cm  LVOT Diameter:           2.40 cm  (1.8-2.4cm)  AoV Area, VTI:           3.56 cm2 (2.5-5.5cm2)  AoV Area,Vmax:           3.52 cm2 (2.5-4.5cm2)  AoV Dimensionless Index: 0.79     TRICUSPID VALVE/RVSP:                              Normal Ranges:  Peak TR Velocity: 2.23 m/s  Est. RA Pressure: 3 mmHg  RV Syst Pressure: 22.9 mmHg (< 30mmHg)     PULMONIC VALVE:                          Normal Ranges:  PV Accel Time: 113 msec (>120ms)  PV Max Bartolo:    1.4 m/s  (0.6-0.9m/s)  PV Max P.6 mmHg        [id number] Dale  Chandana TONY  Electronically signed on 4/4/2024 at 11:07:20 AM    [facility], Cath Lab         [address] [place] [address]     Cardiovascular Catheterization Report     Patient Name:     MARIA LUISA CUNHA Performing           [id number] Kell Gonzalez                                        Physician:           MD  Study Date:       2/6/2023            Verifying Physician: [id number] Gonzalez MD  MRN/PID:          [id number]            Cardiologist:  Accession/Order#: [id number]           Referring Physician: no family doctor  YOB: 1955           Referring Physician:  Gender:           M                   Referring Physician: [id number] Gonzalez MD        Study: Left Heart Catheterization        Indications:  MARIA LUISA CUNHA is a 68 year old male who presents with hypertension and tobacco Use - never. Cardiomyopathy, with a chest pain assessment of typical angina. Study performed as an urgent cath procedure.     Medical History:  Stress test performed: Yes. Stress test type: Stress Nuclear. Stress result: Indeterminate. Ischemic Risk: Intermediate. CTA performed: No. Agatston accessed: No. LVEF Assessed: Yes. LVEF = 18%.     Procedure Description:  After infiltration with 2% Lidocaine, the right radial artery was cannulated with a modified Seldinger technique. Subsequently a 5/6 slender sheath was placed in the right radial artery. Selective coronary catheterization was performed using a 5 Fr catheter(s) exchanged over a guide wire to cannulate the coronary arteries. A JL 3.5 tip catheter was used for left coronary injections. A JR 4 tip catheter was used for right coronary injections.  Multiple injections of contrast were made into the left and right coronary arteries with angiograms recorded in multiple projections. Retrograde left heart catheterizion was  accomplished with a 5 Fr. pigtail catheter. A single plane left ventriculogram was recorded in the 30 degree RM projection. The contrast dose was 20 ml injected at 10 ml/sec. The catheter was then withdrawn across the aortic valve under continuous pressure monitoring and removed.     Coronary Angiography:  The coronary circulation is right dominant.     Left Main Coronary Artery:  The left main artery gives rise to the LAD and Circumflex. There is <10% stenosis in the entire left main coronary artery.     Left Anterior Descending Coronary Artery Distribution:  Presents luminal irregularities.     Circumflex Coronary Artery Distribution:  Presents luminal irregularities.     Right Coronary Artery Distribution:     Presents luminal irregularities.        Left Ventriculography:  The left venticular end diastolic pressure measures 3 mm Hg.       Cardiac Cath Transition of Care Summary:  Post Procedure Diagnosis: Mild CAD.  Blood Loss:               Estimated blood loss during the procedure was <20cc                            mls.  Specimens Removed:        Number of specimen(s) removed: none.     ____________________________________________________________________________________  CONCLUSIONS:   1. The entire Left Main: <10% stenosis.   2. Left Anterior Descending Artery: presents luminal irregularities.   3. Circumflex Coronary Artery: presents luminal irregularities.   4. Right Coronary Artery: presents luminal irregularities.   5. Left Ventricular End Diastolic Pressure: 3 mm Hg.    ECG 12 lead    Result Date: 9/6/2024  AV dual-paced rhythm with prolonged AV conduction with occasional Premature ventricular complexes Biventricular pacemaker detected Abnormal ECG When compared with ECG of 05-SEP-2024 15:10, Electronic ventricular pacemaker has replaced Sinus rhythm    ECG 12 lead    Result Date: 9/5/2024  Normal sinus rhythm Nonspecific intraventricular block Abnormal ECG When compared with ECG of 05-SEP-2024 12:49,  (unconfirmed) Sinus rhythm has replaced Wide QRS rhythm See ED provider note for full interpretation and clinical correlation Confirmed by Ghada Hendrickson ([id number]) on 9/5/2024 5:31:21 PM    ECG 12 lead    Result Date: 9/5/2024  Wide QRS rhythm Left bundle branch block Abnormal ECG When compared with ECG of 20-SEP-2023 14:36, Wide QRS rhythm has replaced Electronic ventricular pacemaker Vent. rate has decreased BY  30 BPM See ED provider note for full interpretation and clinical correlation Confirmed by Ghada Hendrickson ([id number]) on 9/5/2024 3:57:55 PM    XR chest 1 view    Result Date: 9/5/2024  Interpreted By:  Duke Scott, STUDY: XR CHEST 1 VIEW;  9/5/2024 1:19 pm   INDICATION: Signs/Symptoms:Chest Pain.     COMPARISON: 01/11/2024   ACCESSION NUMBER(S): [id number]   ORDERING CLINICIAN: ZENAIDA HARDWICK   FINDINGS: AP portable view of the chest is obtained.  Limited exam due to portable nature. Magnified cardiac silhouette. Cardiac pacer. Elevated left hemidiaphragm. Likely minimal atelectasis and/or scarring in the left lung base. 666       Elevated left hemidiaphragm with likely atelectasis and/or scarring in the left lung base.   MACRO: None   Signed by: Duke Scott 9/5/2024 1:30 PM Dictation workstation:   [id number]        Radiology:     Cardiac Device Check - Inpatient         XR chest 1 view   Final Result   Elevated left hemidiaphragm with likely atelectasis and/or scarring   in the left lung base.        MACRO:   None        Signed by: Duke Scott 9/5/2024 1:30 PM   Dictation workstation:   [id number]          Problem List:     Patient Active Problem List   Diagnosis    BPH with obstruction/lower urinary tract symptoms    Elevated PSA    Fibromyalgia    Gastroesophageal reflux disease without esophagitis    Hematuria    Left bundle branch block (LBBB)    Nocturia    Plantar callus    Polyp of colon    Prehypertension    SK (seborrheic keratosis)    Urinary retention with incomplete bladder  emptying    Class 1 obesity due to excess calories without serious comorbidity with body mass index (BMI) of 30.0 to 30.9 in adult    Cardiomyopathy (Multi)    Abnormal EKG    Abnormal nuclear stress test    Cardiac mass    Epidermal inclusion cyst    Exertional shortness of breath    Left ventricular non-compaction cardiomyopathy (Multi)    Mass of left cardiac ventricle    CHF (NYHA class III, ACC/AHA stage C) (Multi)    BMI 30.0-30.9,adult    Never smoked tobacco    BMI 31.0-31.9,adult    Encounter for medication review and counseling    Encounter to discuss treatment options    NSTEMI (non-ST elevated myocardial infarction) (Multi)       Assessment:   NSTEMI  Nonischemic cardiomyopathy with an EF of 45 to 50% up from 20 to 25%  Chronic diastolic congestive heart failure, NYHA class III  GERD  BPH  Hypertension  Hyperlipidemia        Plan:   Admitted to medicine.  Remains on telemetry.  Telemetry shows AV paced rhythm.  EKGs reviewed.  EKG show AV paced rhythm with left bundle branch block.  Nonspecific ST wave abnormalities.  No STEMI criteria.  Device check shows AP/BVP in the 60s, 0 AT/AF episodes.  Remains on Eliquis.  No ventricular arrhythmias were identified.  Monitor electrolytes, keep potassium greater than 4 and magnesium greater than 2  High suspicion for ACS with symptoms, history and troponin elevation.  Discussed this with the patient.  Patient agreeable to proceed with left heart catheterization today.  Eliquis has been on hold, remains n.p.o.  Continue GDMT for heart failure management  Continue Brilinta and aspirin  No need to repeat echocardiogram at this time  Cardiac rehab  Heart failure navigator consult  Will continue to follow along with you      Giorgi Gutierrez [facility]  Adult Gerontology Acute Care Nurse Practitioner  [facility]   [facility]  [phone]      Of note, this documentation is completed using the Dragon  Dictation system (voice recognition software). There may be spelling and/or grammatical errors that were not corrected prior to final submission.      Electronically signed by JC Vazquez, on 9/6/2024 at 10:34 AM     Patient seen and examined in conjunction with SELENA Sotomayor/CNP and agree with the evaluation as noted above. 69-year-old gentleman with a history of nonischemic cardiomyopathy and last EF of about 45 to 50%, which was an improvement from a baseline of about 15 to 20%, s/p BiV AICD hypertension dyslipidemia and other comorbidities as noted above.  He presented with increasing chest pressure which was midsternal and initially thought was secondary to acid reflux, but the pain recurred yesterday and was quite intense for which she came to the emergency room.  He was noted to have a significantly elevated troponin up to 14,000 consistent with non-STEMI.  His pain has improved and he subsequently underwent cardiac catheterization today that showed significant stenosis in the RCA which was treated with a drug-eluting stent.  He is currently chest pain-free.  EKG shows AV paced rhythm.  Agree with exam as noted above.  Cardiac exam reveals regular first and second heart sound, no S3 or S4 heard.  There were no murmurs.  Chest is clear to auscultation bilaterally.    ASSESSMENT AND PLAN:  1.  Non-STEMI: Presentation is consistent with non-STEMI, s/p cardiac catheterization as noted above with culprit RCA stenosis treated with drug-eluting stent.  Patient is chest pain-free.  We will continue with dual antiplatelet therapy and other current cardiac medications and anticipate possible discharge tomorrow if he continues to be chest pain-free.  2.  Nonischemic cardiomyopathy: With markedly improved LV function as noted above on last echocardiogram.  Will continue with current GDMT as tolerated.  3.  Hypertension: Will continue to optimize blood pressure control.  4.  S/p BiV AICD: With no  recent device shock, we will schedule elective follow-up with EP post discharge.  AKA

## 2024-09-06 NOTE — CARE PLAN
The patient's goals for the shift include      The clinical goals for the shift include pt will remain free from injury      Problem: Safety - Adult  Goal: Free from fall injury  Outcome: Progressing     Problem: Discharge Planning  Goal: Discharge to home or other facility with appropriate resources  Outcome: Progressing     Problem: Chronic Conditions and Co-morbidities  Goal: Patient's chronic conditions and co-morbidity symptoms are monitored and maintained or improved  Outcome: Progressing     Problem: Fall/Injury  Goal: Not fall by end of shift  Outcome: Progressing  Goal: Be free from injury by end of the shift  Outcome: Progressing  Goal: Verbalize understanding of personal risk factors for fall in the hospital  Outcome: Progressing  Goal: Verbalize understanding of risk factor reduction measures to prevent injury from fall in the home  Outcome: Progressing  Goal: Pace activities to prevent fatigue by end of the shift  Outcome: Progressing     Problem: Pain  Goal: Takes deep breaths with improved pain control throughout the shift  Outcome: Progressing  Goal: Turns in bed with improved pain control throughout the shift  Outcome: Progressing  Goal: Walks with improved pain control throughout the shift  Outcome: Progressing  Goal: Performs ADL's with improved pain control throughout shift  Outcome: Progressing

## 2024-09-06 NOTE — POST-PROCEDURE NOTE
Physician Transition of Care Summary  Invasive Cardiovascular Lab    Procedure Date: 9/6/2024  Attending:    * Lexa Kent - Primary  Resident/Fellow/Other Assistant: Surgeons and Role:  * No surgeons found with a matching role *    Indications:   Pre-op Diagnosis      * NSTEMI (non-ST elevated myocardial infarction) (Multi) [I21.4]     * CHF (NYHA class III, ACC/AHA stage C) (Multi) [I50.9]     * Cardiomyopathy, unspecified type (Multi) [I42.9]    Post-procedure diagnosis:   Post-op Diagnosis     * NSTEMI (non-ST elevated myocardial infarction) (Multi) [I21.4]     * CHF (NYHA class III, ACC/AHA stage C) (Multi) [I50.9]     * Cardiomyopathy, unspecified type (Multi) [I42.9]    Procedure(s):   Left Heart Cath  30886 - TX CATH PLMT L HRT & ARTS W/NJX & ANGIO IMG S&I    PCI  74317 - TX PRQ TRLUML CORONARY ANGIOPLASTY ONE ART/BRANCH        Procedure Findings:   95% stenosis of mid right coronary artery, mild disease of left coronary system, ejection fraction about 35%, successful PTCA drug-eluting stent to right coronary artery    Description of the Procedure:   Left heart catheterization coronary angiography left ventriculogram, PTCA drug-eluting stent of the right coronary artery    Complications:   None    Stents/Implants:   Implants       Stent    Stent, Arleth [place] Milo, 3.00 X 22rx - [id number] - Implanted        Inventory item: STENT, ARLETH FRONTIER MILO, 3.00 X 22RX Model/Cat number: [id number]    : [employer] Lot number: [id number]    Device identifier: [id number]        As of 9/6/2024       Status: Implanted                              Anticoagulation/Antiplatelet Plan:   Intravenous heparin, Brilinta    Estimated Blood Loss:   * No values recorded between 9/6/2024 11:37 AM and 9/6/2024 12:40 PM *    Anesthesia: Moderate Sedation Anesthesia Staff: No anesthesia staff entered.    Any Specimen(s) Removed:   No specimens collected during this procedure.    Disposition:   Dual antiplatelet  therapy      Electronically signed by: Lexa Kent MD, 9/6/2024 12:40 PM

## 2024-09-06 NOTE — NURSING NOTE
Patient arrived in [facility] from Cath Lab s/p PCI.  Right Radial arterial sheath puncture site is stable.  D-Stat Band in place.  Patient provided with water, orange juice, and Alexandrea Doone cookies. Patient's wife is bedside.  Patient denies any needs at this time.

## 2024-09-07 VITALS
RESPIRATION RATE: 18 BRPM | HEART RATE: 62 BPM | WEIGHT: 241.4 LBS | SYSTOLIC BLOOD PRESSURE: 125 MMHG | HEIGHT: 75 IN | OXYGEN SATURATION: 93 % | BODY MASS INDEX: 30.02 KG/M2 | DIASTOLIC BLOOD PRESSURE: 70 MMHG | TEMPERATURE: 98.8 F

## 2024-09-07 LAB
ANION GAP SERPL CALC-SCNC: 10 MMOL/L (ref 10–20)
BUN SERPL-MCNC: 24 MG/DL (ref 6–23)
CALCIUM SERPL-MCNC: 8.3 MG/DL (ref 8.6–10.3)
CHLORIDE SERPL-SCNC: 112 MMOL/L (ref 98–107)
CO2 SERPL-SCNC: 23 MMOL/L (ref 21–32)
CREAT SERPL-MCNC: 1.15 MG/DL (ref 0.5–1.3)
EGFRCR SERPLBLD CKD-EPI 2021: 68 ML/MIN/1.73M*2
ERYTHROCYTE [DISTWIDTH] IN BLOOD BY AUTOMATED COUNT: 13.2 % (ref 11.5–14.5)
GLUCOSE SERPL-MCNC: 95 MG/DL (ref 74–99)
HCT VFR BLD AUTO: 43.6 % (ref 41–52)
HGB BLD-MCNC: 14.2 G/DL (ref 13.5–17.5)
MCH RBC QN AUTO: 31.1 PG (ref 26–34)
MCHC RBC AUTO-ENTMCNC: 32.6 G/DL (ref 32–36)
MCV RBC AUTO: 95 FL (ref 80–100)
NRBC BLD-RTO: 0 /100 WBCS (ref 0–0)
PLATELET # BLD AUTO: 142 X10*3/UL (ref 150–450)
POTASSIUM SERPL-SCNC: 4 MMOL/L (ref 3.5–5.3)
RBC # BLD AUTO: 4.57 X10*6/UL (ref 4.5–5.9)
SODIUM SERPL-SCNC: 141 MMOL/L (ref 136–145)
WBC # BLD AUTO: 6.8 X10*3/UL (ref 4.4–11.3)

## 2024-09-07 PROCEDURE — 80048 BASIC METABOLIC PNL TOTAL CA: CPT | Performed by: INTERNAL MEDICINE

## 2024-09-07 PROCEDURE — 2500000002 HC RX 250 W HCPCS SELF ADMINISTERED DRUGS (ALT 637 FOR MEDICARE OP, ALT 636 FOR OP/ED): Performed by: NURSE PRACTITIONER

## 2024-09-07 PROCEDURE — 36415 COLL VENOUS BLD VENIPUNCTURE: CPT | Performed by: INTERNAL MEDICINE

## 2024-09-07 PROCEDURE — 99239 HOSP IP/OBS DSCHRG MGMT >30: CPT | Performed by: INTERNAL MEDICINE

## 2024-09-07 PROCEDURE — 2500000001 HC RX 250 WO HCPCS SELF ADMINISTERED DRUGS (ALT 637 FOR MEDICARE OP): Performed by: INTERNAL MEDICINE

## 2024-09-07 PROCEDURE — 2500000005 HC RX 250 GENERAL PHARMACY W/O HCPCS: Performed by: INTERNAL MEDICINE

## 2024-09-07 PROCEDURE — 85027 COMPLETE CBC AUTOMATED: CPT | Performed by: INTERNAL MEDICINE

## 2024-09-07 PROCEDURE — 99233 SBSQ HOSP IP/OBS HIGH 50: CPT | Performed by: INTERNAL MEDICINE

## 2024-09-07 PROCEDURE — 2500000002 HC RX 250 W HCPCS SELF ADMINISTERED DRUGS (ALT 637 FOR MEDICARE OP, ALT 636 FOR OP/ED): Performed by: INTERNAL MEDICINE

## 2024-09-07 ASSESSMENT — PAIN SCALES - GENERAL: PAINLEVEL_OUTOF10: 0 - NO PAIN

## 2024-09-07 ASSESSMENT — PAIN - FUNCTIONAL ASSESSMENT: PAIN_FUNCTIONAL_ASSESSMENT: 0-10

## 2024-09-07 ASSESSMENT — ACTIVITIES OF DAILY LIVING (ADL): LACK_OF_TRANSPORTATION: NO

## 2024-09-07 NOTE — NURSING NOTE
Discharge instructions reviewed with patient and wife.  New medications reviewed and education given.  Both deny questions and verbalize understanding.  Thirty day supply of Brilinta provided to patient here and this was sent home with him.  Patient transported to front door via wheelchair.

## 2024-09-07 NOTE — PROGRESS NOTES
[facility] Progress Note           Rounding Cardiologist:  Dr. Terrance Fry  Primary Cardiologist: Dr. Deborah Lawler    Date:  9/7/2024  Patient:  Alex Alba  YOB: 1955  MRN:  [id number]   Admit Date:  9/5/2024      SUBJECTIVE:    Alex Alba was seen and examined today at bedside.     He denies any chest pain or shortness of breath.     His overnight history is negative.      VITALS:     Vitals:    09/07/24 0001 09/07/24 0423 09/07/24 0657 09/07/24 0730   BP: 104/59 128/77  133/74   BP Location:    Right arm   Patient Position:    Lying   Pulse: 61 60  60   Resp:    18   Temp: 37 °C (98.6 °F) 36.5 °C (97.7 °F)  36.6 °C (97.9 °F)   TempSrc:    Temporal   SpO2: 92% 95%  94%   Weight:   110 kg (241 lb 6.5 oz)    Height:           Intake/Output Summary (Last 24 hours) at 9/7/2024 0916  Last data filed at 9/6/2024 1243  Gross per 24 hour   Intake 32 ml   Output 0 ml   Net 32 ml       Wt Readings from Last 4 Encounters:   09/07/24 110 kg (241 lb 6.5 oz)   08/13/24 113 kg (248 lb 9.6 oz)   07/12/24 113 kg (250 lb)   06/03/24 113 kg (249 lb 3.2 oz)       CURRENT MEDICATIONS:   apixaban, 5 mg, oral, BID  atorvastatin, 40 mg, oral, Nightly  empagliflozin, 10 mg, oral, Daily  metoprolol succinate XL, 25 mg, oral, Daily  oxygen, , inhalation, Continuous - Inhalation  sacubitriL-valsartan, 1 tablet, oral, BID  spironolactone, 12.5 mg, oral, Every other day  tamsulosin, 0.4 mg, oral, BID  ticagrelor, 90 mg, oral, BID         Current Outpatient Medications   Medication Instructions    acetaminophen (TYLENOL) 500 mg, oral, Every 6 hours PRN    apixaban (Eliquis) 5 mg tablet TAKE 1 TABLET BY MOUTH TWO TIMES A DAY    aspirin 81 mg, oral, Daily    atorvastatin (LIPITOR) 40 mg, oral, Nightly    Brilinta 90 mg, oral, 2 times daily    empagliflozin (JARDIANCE) 10 mg, oral, Daily    Entresto 24-26 mg tablet 1 tablet, oral, 2 times daily    metoprolol succinate XL (TOPROL-XL) 25 mg, oral, Daily, Do not crush or  "chew.    nitroglycerin (NITROSTAT) 0.4 mg, sublingual, Every 5 min PRN    spironolactone (ALDACTONE) 12.5 mg, oral, Every other day    tamsulosin (FLOMAX) 0.8 mg, oral, 2 times daily        PHYSICAL EXAMINATION:   GENERAL:  Well developed, well nourished, in no acute distress.  CHEST:  Symmetric and nontender.  NEURO/PSYCH:  Alert and oriented times three with approppriate behavior and responses.  NECK:  Supple, no JVD, no bruit.  LUNGS:  Clear to auscultation bilaterally, normal respiratory effort.  HEART:  Rate and rhythm regular with no evident murmur, no gallop appreciated.        There are no rubs, clicks or heaves.  EXTREMITIES:  Warm with good color, no clubbing or cyanosis.  There is no edema noted.  PERIPHERAL VASCULAR:  Pulses present and equally palpable; 2+ throughout.      LAB DATA:     CBC:   Results from last 7 days   Lab Units 09/07/24  0552   WBC AUTO x10*3/uL 6.8   RBC AUTO x10*6/uL 4.57   HEMOGLOBIN g/dL 14.2   HEMATOCRIT % 43.6   PLATELETS AUTO x10*3/uL 142*        CMP:    Results from last 7 days   Lab Units 09/07/24  0552   SODIUM mmol/L 141   POTASSIUM mmol/L 4.0   CHLORIDE mmol/L 112*   CO2 mmol/L 23   BUN mg/dL 24*   CREATININE mg/dL 1.15   GLUCOSE mg/dL 95   CALCIUM mg/dL 8.3*       Cardiac Enzymes:    Lab Results   Component Value Date    TROPHS 14,913 () 09/06/2024    TROPHS 14,127 () 09/06/2024    TROPHS 217 () 09/05/2024       Magnesium:    Lab Results   Component Value Date    MG 1.96 09/06/2024       Lipid Profile:    Lab Results   Component Value Date    TRIG 97 09/06/2024    HDL 36.9 09/06/2024    LDLCALC 100 (H) 09/06/2024       TSH:  No results found for: \"TSH\"    BNP:   Lab Results   Component Value Date     (H) 09/05/2024        PT/INR:    Lab Results   Component Value Date    PROTIME 14.2 (H) 09/05/2024    INR 1.3 (H) 09/05/2024       HgBA1c:    Lab Results   Component Value Date    HGBA1C 5.8 (H) 05/20/2024       CBC:  Lab Results   Component Value Date    WBC 6.8 " 09/07/2024    WBC 7.3 09/06/2024    WBC 10.4 09/05/2024    RBC 4.57 09/07/2024    RBC 4.52 09/06/2024    RBC 4.81 09/05/2024    HGB 14.2 09/07/2024    HGB 14.1 09/06/2024    HGB 15.3 09/05/2024    HCT 43.6 09/07/2024    HCT 42.8 09/06/2024    HCT 46.3 09/05/2024    MCV 95 09/07/2024    MCV 95 09/06/2024    MCV 96 09/05/2024    MCH 31.1 09/07/2024    MCH 31.2 09/06/2024    MCH 31.8 09/05/2024    MCHC 32.6 09/07/2024    MCHC 32.9 09/06/2024    MCHC 33.0 09/05/2024    RDW 13.2 09/07/2024    RDW 13.2 09/06/2024    RDW 13.0 09/05/2024     (L) 09/07/2024     09/06/2024     09/05/2024       BMP:  Lab Results   Component Value Date     09/07/2024     09/06/2024     09/05/2024    K 4.0 09/07/2024    K 4.2 09/06/2024    K 4.0 09/06/2024     (H) 09/07/2024     (H) 09/06/2024     (H) 09/05/2024    CO2 23 09/07/2024    CO2 26 09/06/2024    CO2 24 09/05/2024    BUN 24 (H) 09/07/2024    BUN 24 (H) 09/06/2024    BUN 23 09/05/2024    CREATININE 1.15 09/07/2024    CREATININE 1.15 09/06/2024    CREATININE 1.15 09/05/2024       Hepatic Function Panel:    Lab Results   Component Value Date    ALKPHOS 58 09/05/2024    ALT 8 (L) 09/05/2024    AST 10 09/05/2024    PROT 6.3 (L) 09/05/2024    BILITOT 0.7 09/05/2024       DIAGNOSTIC TESTING:     Electrocardiogram 12 Lead    Result Date: 9/6/2024  Atrial-sensed ventricular-paced rhythm Abnormal ECG When compared with ECG of 06-SEP-2024 01:10, (unconfirmed) Premature ventricular complexes are no longer Present Vent. rate has decreased BY   8 BPM    Cardiac Catheterization Procedure    Result Date: 9/6/2024   [facility], Cath Lab        [address] [place] [address] Cardiovascular Catheterization Report Patient Name:     MARIA LUISA CUNHA Performing Physician:  [id number] Kent MD Study Date:       9/6/2024            Verifying Physician:   Carlos Manuel Lindquist  Donte TONY MRN/PID:          [id number]            Cardiologist/Co-Scrub: Accession#:       [id number]        Ordering Provider:     [id number] FELY LEE                                                              DOZIER Date of           1955 / 69      Cardiologist: Birth/Age:        years Gender:           M                   Fellow: Encounter#:       [id number]          Surgeon:  Study:            Left Heart Cath Additional Study: PCI - Percutaneous Coronary Intervention  Indications: MARIA LUISA CUNHA is a 69 year old male who presents with hypertension, dyslipidemia and a chest pain assessment of typical angina. NSTE - ACS. Stress test performed: No. CTA performed: No. Tonia accessed: No. LVEF Assessed: No. Cardiac arrest: No. Cardiac surgical consult: No. Cardiovascular Instability: No Frailty status of patient entering lab: 5 = Mildly frail.  Procedure Description: After infiltration with 2% Lidocaine, the right radial artery was cannulated with a modified Seldinger technique. Subsequently a 5/6 slender sheath was placed in the right radial artery. Additional catheter(s) used to visualize the coronary arteries were: XBRC. Multiple injections of contrast were made into the left and right coronary arteries with angiograms recorded in multiple projections.  Coronary Angiography: The coronary circulation is right dominant.  Left Main Coronary Artery: The left main coronary artery is free of atherosclerotic disease.  Left Anterior Descending Coronary Artery Distribution: The Left Anterior Descending artery is a large vessel. There is 40% stenosis in the proximal left anterior descending artery. Presents luminal irregularities.  Circumflex Coronary Artery Distribution: The Left Circumflex artery is a medium-sized vessel. Presents luminal irregularities. There is 50% stenosis in the the mid Circumflex artery.  Right Coronary Artery Distribution: The  Right Coronary Artery is a large vessel. Hemodynamically significant obstruction is noted in this vessel. There is 95% stenosis in the the mid Right Coronary Artery. The devices advanced to the the mid RCA lesion were: a balloon was inflated for pre-dilation, Resolute Luis Enrique 3.00x22 stent was deployed in the lesion a balloon was inflated for post-dilation. Residual stenosis is 0%.  Left Ventriculography: The estimated left ventricular ejection fraction is abnormal at 35%.  Coronary Interventions: Angiography reveals a 95% stenosis of the mid right coronary artery coronary artery. Pre-intervention BHARGAVI flow was 3. Percutaneous coronary intervention was performed within the mid right coronary artery. The vessel was pre-dilated using a compliant balloon 2.0 mm x 15 mm at 12 DEANDRA. [place] Luis Enrique drug-eluting stent 3.0 mm x 22 mm was advanced to the lesion and implanted at 16 DEANDRA. The stent was post dilated using a non-compliant balloon 3.25 mm x 15 mm at 16 DEANDRA. The stenosis was successfully reduced from 95% to 0%. Post-intervention BHARGAVI flow was 3.  Hemo Personnel: +---------------+---------+ Name           Duty      +---------------+---------+ Lexa Kent MD 1 +---------------+---------+  Hemodynamic Pressures:  +----+-------------------+---------+------------+-------------+------+---------+ Site     Date Time       Phase    Systolic    Diastolic    ED  Mean mmHg                          Name       mmHg        mmHg      mmHg           +----+-------------------+---------+------------+-------------+------+---------+   LV  9/6/2024 12:13:07 AIR REST           0            0   -34                               PM                                                  +----+-------------------+---------+------------+-------------+------+---------+   LV  9/6/2024 12:14:43 AIR REST           0            0    -9                               PM                                                   +----+-------------------+---------+------------+-------------+------+---------+   LV  9/6/2024 12:15:03 AIR REST          88           69    71                               PM                                                  +----+-------------------+---------+------------+-------------+------+---------+   LV  9/6/2024 12:15:33 AIR REST          86           66    68                               PM                                                  +----+-------------------+---------+------------+-------------+------+---------+   AO  9/6/2024 12:17:11 AIR REST          96           72             84                      PM                                                  +----+-------------------+---------+------------+-------------+------+---------+   AO  9/6/2024 12:17:49 AIR REST          95           69             82                      PM                                                  +----+-------------------+---------+------------+-------------+------+---------+   AO  9/6/2024 12:25:21 AIR REST         130           71             92                      PM                                                  +----+-------------------+---------+------------+-------------+------+---------+  Cardiac Cath Post Procedure Notes: Post Procedure Diagnosis: NINFA of RCA. Blood Loss:               Estimated blood loss during the procedure was 2 mls. Specimens Removed:        Number of specimen(s) removed: none. ____________________________________________________________________________________ CONCLUSIONS:  1. Left Main Coronary Artery: This artery is normal.  2. Left Anterior Descending Artery: presents luminal irregularities.  3. Proximal LAD Lesion: The percent stenosis is 40%.  4. Circumflex Coronary Artery: presents luminal irregularities.  5. Mid CX Lesion: The percent stenosis is 50%.  6. Right Coronary Artery:  significantly obstructed.  7. Mid RCA Lesion: The percent stenosis is 95%.  8. Mid RCA Lesion: pre-dilation, Resolute [place] 3.00x22 post-dilation: 0% residual stenosis. RCA: pre-procedure BHARGAVI flow was 3(complete perfusion) and post-procedure BHARGAVI flow was 3(complete perfusion).  9. The Left Ventricular Ejection Fraction is 35%. ICD 10 Codes: Non ST elevation (NSTEMI) myocardial infarction-I21.4  CPT Codes: Left Heart Cath (visualization of coronaries) and LV-96627; Stent w angioplasty Right Coronary single major Artery branch (PCI)-58185.RC; Moderate Sedation Services 1st additional 15 minutes patient >5 years-56149; Moderate Sedation Services 2nd additional 15 minutes patient >5 years-35079  [id number] Lexa Kent MD Performing Physician Electronically signed by Carlos Manuel Kent MD on 9/6/2024 at 1:02:52 PM  ** Final **     ECG 12 lead    Result Date: 9/6/2024  AV dual-paced rhythm with prolonged AV conduction with occasional Premature ventricular complexes Biventricular pacemaker detected Abnormal ECG When compared with ECG of 05-SEP-2024 15:10, Electronic ventricular pacemaker has replaced Sinus rhythm    ECG 12 lead    Result Date: 9/5/2024  Normal sinus rhythm Nonspecific intraventricular block Abnormal ECG When compared with ECG of 05-SEP-2024 12:49, (unconfirmed) Sinus rhythm has replaced Wide QRS rhythm See ED provider note for full interpretation and clinical correlation Confirmed by Ghada Hendrickson ([id number]) on 9/5/2024 5:31:21 PM    ECG 12 lead    Result Date: 9/5/2024  Wide QRS rhythm Left bundle branch block Abnormal ECG When compared with ECG of 20-SEP-2023 14:36, Wide QRS rhythm has replaced Electronic ventricular pacemaker Vent. rate has decreased BY  30 BPM See ED provider note for full interpretation and clinical correlation Confirmed by Ghada Hendrickson ([id number]) on 9/5/2024 3:57:55 PM    XR chest 1 view    Result Date: 9/5/2024  Interpreted By:  Duke Scott, STUDY: XR CHEST 1 VIEW;  9/5/2024  1:19 pm   INDICATION: Signs/Symptoms:Chest Pain.     COMPARISON: 01/11/2024   ACCESSION NUMBER(S): [id number]   ORDERING CLINICIAN: ZENAIDA HARDWICK   FINDINGS: AP portable view of the chest is obtained.  Limited exam due to portable nature. Magnified cardiac silhouette. Cardiac pacer. Elevated left hemidiaphragm. Likely minimal atelectasis and/or scarring in the left lung base. 666       Elevated left hemidiaphragm with likely atelectasis and/or scarring in the left lung base.   MACRO: None   Signed by: Duke Scott 9/5/2024 1:30 PM Dictation workstation:   [id number]       RADIOLOGY:     Cardiac Catheterization Procedure   Final Result      Cardiac Device Check - Inpatient         XR chest 1 view   Final Result   Elevated left hemidiaphragm with likely atelectasis and/or scarring   in the left lung base.        MACRO:   None        Signed by: Duke Scott 9/5/2024 1:30 PM   Dictation workstation:   [id number]          PROBLEM LIST     Patient Active Problem List   Diagnosis    BPH with obstruction/lower urinary tract symptoms    Elevated PSA    Fibromyalgia    Gastroesophageal reflux disease without esophagitis    Hematuria    Left bundle branch block (LBBB)    Nocturia    Plantar callus    Polyp of colon    Prehypertension    SK (seborrheic keratosis)    Urinary retention with incomplete bladder emptying    Class 1 obesity due to excess calories without serious comorbidity with body mass index (BMI) of 30.0 to 30.9 in adult    Cardiomyopathy (Multi)    Abnormal EKG    Abnormal nuclear stress test    Cardiac mass    Epidermal inclusion cyst    Exertional shortness of breath    Left ventricular non-compaction cardiomyopathy (Multi)    Mass of left cardiac ventricle    CHF (NYHA class III, ACC/AHA stage C) (Multi)    BMI 30.0-30.9,adult    Never smoked tobacco    BMI 31.0-31.9,adult    Encounter for medication review and counseling    Encounter to discuss treatment options    NSTEMI (non-ST elevated  myocardial infarction) (Multi)       ASSESSMENT:       69-year-old gentleman seen evaluate today at the bedside in telemetry.    Bedside examination evaluation were performed by me.    Chart was reviewed in detail discussed the patient at length along with the nursing staff as well.    Impression:  ASHD functional class II  Acute coronary syndrome with acute intervention PCI and stenting of a 95% RCA occlusion  Ischemic cardiomyopathy 35% ejection fraction  Biventricular ICD  Chronic anticoagulation      PLAN:   Recommendation:  Patient suitable for discharge  Patient continue aspirin for a week and then discontinue  Brilinta twice daily indefinitely or at least 1 year  Can resume Eliquis as before later today  EP follow-up  See Dr. Kelsey JUDD within a week after discharge  Usual safety measures on multiple anticoagulation and antiplatelet medications  Final disposition per primary care    Please do not hesitate to call with questions.  Electronically signed by Terrance Fry DO, on 9/7/2024 at 9:16 AM

## 2024-09-07 NOTE — DISCHARGE SUMMARY
Discharge Diagnosis  NSTEMI (non-ST elevated myocardial infarction) (Multi)    Issues Requiring Follow-Up  Follow-up with cardiology in 1 to 2 weeks.  Follow-up with PCP in 2 weeks.    Discharge Meds     Medication List      START taking these medications     aspirin 81 mg chewable tablet; Chew 1 tablet (81 mg) once daily for 7   days.   atorvastatin 40 mg tablet; Commonly known as: Lipitor; Take 1 tablet (40   mg) by mouth once daily at bedtime.   Brilinta 90 mg tablet; Generic drug: ticagrelor; Take 1 tablet (90 mg)   by mouth 2 times a day.   nitroglycerin 0.4 mg SL tablet; Commonly known as: Nitrostat; Place 1   tablet (0.4 mg) under the tongue every 5 minutes if needed for chest pain.     CONTINUE taking these medications     acetaminophen 325 mg tablet; Commonly known as: Tylenol   apixaban 5 mg tablet; Commonly known as: Eliquis; TAKE 1 TABLET BY MOUTH   TWO TIMES A DAY   empagliflozin 10 mg; Commonly known as: Jardiance; Take 1 tablet (10 mg)   by mouth once daily.   Entresto 24-26 mg tablet; Generic drug: sacubitriL-valsartan; TAKE 1   TABLET BY MOUTH TWICE A DAY   metoprolol succinate XL 25 mg 24 hr tablet; Commonly known as:   Toprol-XL; Take 1 tablet (25 mg) by mouth once daily. Do not crush or   chew.   spironolactone 25 mg tablet; Commonly known as: Aldactone; Take 0.5   tablets (12.5 mg) by mouth every other day.   tamsulosin 0.4 mg 24 hr capsule; Commonly known as: Flomax; Take 2   capsules (0.8 mg) by mouth 2 times a day.       Test Results Pending At Discharge  Pending Labs       No current pending labs.            Hospital Course   Alex Alba is a 69 y.o. male with past medical history of nonischemic cardiomyopathy, status post ICD placement, history of LVEF of 15 to 20% later recovered to 45 to 50% with biventricular pacing and optimal heart therapy, hypertension, hyperlipidemia comes into the hospital due to chest pain.  Admitted with NSTEMI with troponin trending up.  Patient was  started with heparin drip.  Cardiology saw the patient underwent left heart cath that revealed severe 95% stenosis in the mid RCA, mild disease of the left coronary system and an LVEF of 35%. Patient underwent successful PCI with 1 NINFA placed to the mid RCA reducing that lesion down to 0% stenosis.  Cardiology recommending to continue with Brilinta twice daily and continue with Eliquis and her aspirin for 1 week.  The patient is doing well today denies any further chest pains.  Cardiology has cleared the patient for discharge.  The patient was stable for discharge and outpatient follow-up    Pertinent Physical Exam At Time of Discharge  Physical Exam  Constitutional:       Appearance: Normal appearance.   HENT:      Head: Normocephalic and atraumatic.      Nose: Nose normal.      Mouth/Throat:      Mouth: Mucous membranes are dry.   Eyes:      Extraocular Movements: Extraocular movements intact.      Conjunctiva/sclera: Conjunctivae normal.   Cardiovascular:      Rate and Rhythm: Normal rate and regular rhythm.      Pulses: Normal pulses.      Heart sounds: Normal heart sounds.   Pulmonary:      Effort: Pulmonary effort is normal.      Breath sounds: Normal breath sounds.   Abdominal:      General: Bowel sounds are normal.      Palpations: Abdomen is soft.   Musculoskeletal:         General: Normal range of motion.      Cervical back: Normal range of motion and neck supple.   Skin:     General: Skin is warm and dry.   Neurological:      General: No focal deficit present.      Mental Status: He is alert and oriented to person, place, and time. Mental status is at baseline.   Psychiatric:         Mood and Affect: Mood normal.         Behavior: Behavior normal.         Thought Content: Thought content normal.         Judgment: Judgment normal.         Outpatient Follow-Up  Future Appointments   Date Time Provider Department Center   10/21/2024  9:45 AM Dale Ellington MD [id number] [place]   12/3/2024  9:30 AM Rodney RUIZ  MD Sandra [id number] [place]   1/8/2025  9:40 AM [facility] [id number] [place]   1/8/2025 10:30 AM SELENA [facility] [id number] [place]   8/14/2025 11:10 AM Darwin Hagan MD [id number] [place]         Jeimy Manjarrez MD

## 2024-09-07 NOTE — PROGRESS NOTES
09/07/24 1321   Discharge Planning   Living Arrangements Spouse/significant other   Support Systems Spouse/significant other   Assistance Needed none   Type of Residence Private residence   Who is requesting discharge planning? Provider   Home or Post Acute Services None   Expected Discharge Disposition Home   Does the patient need discharge transport arranged? No   Transportation Needs   In the past 12 months, has lack of transportation kept you from medical appointments or from getting medications? no   In the past 12 months, has lack of transportation kept you from meetings, work, or from getting things needed for daily living? No   Patient Choice   Patient / Family choosing to utilize agency / facility established prior to hospitalization No     Pt plan is home.  RN Navigator has provided pt Brilinta.  Spouse to provide transport home.    Yes

## 2024-09-09 ENCOUNTER — PATIENT OUTREACH (OUTPATIENT)
Dept: CARDIOLOGY | Facility: CLINIC | Age: 69
End: 2024-09-09
Payer: MEDICARE

## 2024-09-09 LAB
ATRIAL RATE: 55 BPM
ATRIAL RATE: 60 BPM
P AXIS: -8 DEGREES
P AXIS: 29 DEGREES
P OFFSET: 180 MS
P ONSET: 132 MS
PR INTERVAL: 152 MS
PR INTERVAL: 226 MS
Q ONSET: 208 MS
Q ONSET: 210 MS
QRS COUNT: 10 BEATS
QRS COUNT: 10 BEATS
QRS DURATION: 124 MS
QRS DURATION: 166 MS
QT INTERVAL: 454 MS
QT INTERVAL: 478 MS
QTC CALCULATION(BAZETT): 454 MS
QTC CALCULATION(BAZETT): 508 MS
QTC FREDERICIA: 454 MS
QTC FREDERICIA: 498 MS
R AXIS: 46 DEGREES
R AXIS: 9 DEGREES
T AXIS: -51 DEGREES
T AXIS: 203 DEGREES
T OFFSET: 435 MS
T OFFSET: 449 MS
VENTRICULAR RATE: 60 BPM
VENTRICULAR RATE: 68 BPM

## 2024-09-09 NOTE — PROGRESS NOTES
Discharge Facility:  [facility]    Discharge Diagnosis:  NSTEMI (non-ST elevated myocardial infarction)   S/p successful PCI with 1 NINFA placed    Admission Date:  9/5/24  Discharge Date:   9/7/24    PCP Appointment Date:  12/3/24-I was unable to help make appt with PCP for sooner date, due to no openings- pt calling office   Specialist Appointment Date:   Visit Type: Cardiology Clinic Visit           Date: 9/12/2024                  Dept: [facility]                  Provider: Giorgi Gutierrez                  Time: 2:45 PM  Hospital Encounter and Summary Linked: Yes    See discharge assessment below for further details    Medications  Medications reviewed with patient/caregiver?: Yes (9/9/2024 11:15 AM)  Is the patient having any side effects they believe may be caused by any medication additions or changes?: No (9/9/2024 11:15 AM)  Does the patient have all medications ordered at discharge?: Yes (Thirty day supply of Brilinta provided to patient here and this was sent home with him. Lipitor & nitro picked up from [facility] already) (9/9/2024 11:15 AM)  Care Management Interventions: Provided patient education (9/9/2024 11:15 AM)  Prescription Comments: START taking these medications     aspirin 81 mg chewable tablet; Chew 1 tablet (81 mg) once daily for 7   days.   atorvastatin 40 mg tablet; Commonly known as: Lipitor; Take 1 tablet (40   mg) by mouth once daily at bedtime.   Brilinta 90 mg tablet; Generic drug: ticagrelor; Take 1 tablet (90 mg)   by mouth 2 times a day.   nitroglycerin 0.4 mg SL tablet; Commonly known as: Nitrostat; Place 1   tablet (0.4 mg) under the tongue every 5 minutes if needed for chest pain (9/9/2024 11:15 AM)  Is the patient taking all medications as directed (includes completed medication regime)?: Yes (9/9/2024 11:15 AM)  Care Management Interventions: Provided patient education (9/9/2024 11:15 AM)  Medication Comments: CM discussed referencing  discharge paperwork to follow detailed daily medication schedule. Patient endorses understanding & compliance with medications. (9/9/2024 11:15 AM)    Appointments  Does the patient have a primary care provider?: Yes (I was unable to help make appt with PCP due to no openings- pt calling office) (9/9/2024 11:15 AM)  Care Management Interventions: Verified appointment date/time/provider (9/9/2024 11:15 AM)  Has the patient kept scheduled appointments due by today?: Yes (9/9/2024 11:15 AM)  Care Management Interventions: Educated on importance of keeping appointment (Reviewed up coming cardio appt with Giorgi Gutierrez) (9/9/2024 11:15 AM)    Self Management  Has home health visited the patient within 72 hours of discharge?: Not applicable (9/9/2024 11:15 AM)  What Durable Medical Equipment (DME) was ordered?: n.a (9/9/2024 11:15 AM)  Has all Durable Medical Equipment (DME) been delivered?: Yes (9/9/2024 11:15 AM)    Patient Teaching  Does the patient have access to their discharge instructions?: Yes (9/9/2024 11:15 AM)  Care Management Interventions: Reviewed instructions with patient; Educated on MyChart (9/9/2024 11:15 AM)  What is the patient's perception of their health status since discharge?: Improving (9/9/2024 11:15 AM)  Is the patient/caregiver able to teach back the hierarchy of who to call/visit for symptoms/problems? PCP, Specialist, Home Health nurse, Urgent Care, ED, 911: Yes (9/9/2024 11:15 AM)    Wrap Up  Is the patient/caregiver familiar with Advance Care Planning?: Yes (9/9/2024 11:15 AM)  Would the patient like more information on Advance Care Planning?: No (Waiting to get all papers back from . This CM suggested to take to next appt after getting papers to have them scan copy into EPIC chart.) (9/9/2024 11:15 AM)  Wrap Up Additional Comments: Successful transition of care outreach with patient. CM introduced myself and the TCM program to Alex Alba. Reviewed hospital stay and answered  any questions. Patient denies any further discharge questions/needs at this time. CM gave my contact information and encouraged to call if needing assistance or has any further non-emergent questions prior to my next outreach. (9/9/2024 11:15 AM)

## 2024-09-12 ENCOUNTER — OFFICE VISIT (OUTPATIENT)
Dept: URGENT CARE | Age: 69
End: 2024-09-12
Payer: MEDICARE

## 2024-09-12 ENCOUNTER — OFFICE VISIT (OUTPATIENT)
Dept: CARDIOLOGY | Facility: CLINIC | Age: 69
End: 2024-09-12
Payer: MEDICARE

## 2024-09-12 VITALS
DIASTOLIC BLOOD PRESSURE: 70 MMHG | HEART RATE: 84 BPM | BODY MASS INDEX: 30.86 KG/M2 | SYSTOLIC BLOOD PRESSURE: 132 MMHG | WEIGHT: 246.9 LBS

## 2024-09-12 VITALS
RESPIRATION RATE: 16 BRPM | TEMPERATURE: 98.4 F | DIASTOLIC BLOOD PRESSURE: 79 MMHG | HEART RATE: 85 BPM | WEIGHT: 245 LBS | BODY MASS INDEX: 30.62 KG/M2 | SYSTOLIC BLOOD PRESSURE: 136 MMHG | OXYGEN SATURATION: 97 %

## 2024-09-12 DIAGNOSIS — I42.9 CARDIOMYOPATHY, UNSPECIFIED TYPE (MULTI): ICD-10-CM

## 2024-09-12 DIAGNOSIS — M10.9 ACUTE GOUT OF LEFT KNEE, UNSPECIFIED CAUSE: ICD-10-CM

## 2024-09-12 DIAGNOSIS — I21.4 NSTEMI (NON-ST ELEVATED MYOCARDIAL INFARCTION) (MULTI): ICD-10-CM

## 2024-09-12 DIAGNOSIS — R94.31 ABNORMAL EKG: Primary | ICD-10-CM

## 2024-09-12 DIAGNOSIS — M25.562 ACUTE PAIN OF LEFT KNEE: Primary | ICD-10-CM

## 2024-09-12 PROCEDURE — 1159F MED LIST DOCD IN RCRD: CPT | Performed by: NURSE PRACTITIONER

## 2024-09-12 PROCEDURE — 99496 TRANSJ CARE MGMT HIGH F2F 7D: CPT | Performed by: NURSE PRACTITIONER

## 2024-09-12 PROCEDURE — 1111F DSCHRG MED/CURRENT MED MERGE: CPT | Performed by: NURSE PRACTITIONER

## 2024-09-12 RX ORDER — PREDNISONE 20 MG/1
20 TABLET ORAL DAILY
Qty: 7 TABLET | Refills: 0 | Status: SHIPPED | OUTPATIENT
Start: 2024-09-12 | End: 2024-09-19

## 2024-09-12 RX ORDER — COLCHICINE 0.6 MG/1
TABLET ORAL
Qty: 9 TABLET | Refills: 0 | Status: SHIPPED | OUTPATIENT
Start: 2024-09-12

## 2024-09-12 NOTE — PROGRESS NOTES
Subjective   Patient ID: Alex Alba is a 69 y.o. male. They present today with a chief complaint of Knee Pain.    History of Present Illness  Pt presents with pain, swelling and warmth in the left knee. Hx of gout in the right ankle around Thanksgiving. Pt has apt with PCP on Monday for follow-up for hospital admit from MI. No pain in the calf or swelling. Pain with trying to get up and on palpation of the knee. Once up ok to walk. No other associated symptoms or concerns. No injury to the knee.       Knee Pain         Past Medical History  Allergies as of 09/12/2024    (No Known Allergies)       (Not in a hospital admission)       Past Medical History:   Diagnosis Date    Acute maxillary sinusitis, unspecified 07/27/2020    Subacute maxillary sinusitis    Cellulitis of right finger 06/03/2019    Paronychia of finger of right hand    CHF (congestive heart failure) (Multi)     Encounter for immunization     Encounter for immunization    Encounter for screening for lipoid disorders 12/03/2019    Screening, lipid    Encounter for screening for malignant neoplasm of colon 05/13/2021    Encounter for screening for colorectal malignant neoplasm    Encounter for screening for malignant neoplasm of prostate 12/03/2019    Screening PSA (prostate specific antigen)    Hypertension     Impingement syndrome of left shoulder     Impingement syndrome of left shoulder    Personal history of other diseases of the musculoskeletal system and connective tissue     History of arthritis    Personal history of other drug therapy 05/28/2020    History of pneumococcal vaccination    Personal history of other specified conditions 08/29/2019    History of fatigue       Past Surgical History:   Procedure Laterality Date    CARDIAC CATHETERIZATION N/A 9/6/2024    Procedure: Left Heart Cath;  Surgeon: Lexa Kent MD;  Location: [facility];  Service: Cardiovascular;  Laterality: N/A;    CARDIAC CATHETERIZATION N/A 9/6/2024     Procedure: PCI;  Surgeon: Lexa Kent MD;  Location: [facility];  Service: Cardiovascular;  Laterality: N/A;    CARDIAC DEFIBRILLATOR PLACEMENT      OTHER SURGICAL HISTORY  10/19/2021    Prostate biopsy    OTHER SURGICAL HISTORY  06/15/2021    Hernia repair    OTHER SURGICAL HISTORY  06/15/2021    Colonoscopy        reports that he has never smoked. He has never been exposed to tobacco smoke. He has never used smokeless tobacco. He reports that he does not drink alcohol and does not use drugs.    Review of Systems  Review of Systems  10 point ROS completed and all are negative other than what is stated in the current HPI                               Objective    Vitals:    09/12/24 1709   BP: 136/79   Pulse: 85   Resp: 16   Temp: 36.9 °C (98.4 °F)   SpO2: 97%   Weight: 111 kg (245 lb)     No LMP for male patient.    Physical Exam  Constitutional:       Appearance: Normal appearance.   Musculoskeletal:      Right knee: Normal.      Left knee: Swelling and erythema present. No bony tenderness. Tenderness present over the medial joint line, lateral joint line and patellar tendon.      Comments: No other abnormalities noted on assessment   Neurological:      Mental Status: He is alert.         Procedures    Point of Care Test & Imaging Results from this visit  Results for orders placed or performed during the hospital encounter of 09/05/24   CBC and Auto Differential   Result Value Ref Range    WBC 10.4 4.4 - 11.3 x10*3/uL    nRBC 0.0 0.0 - 0.0 /100 WBCs    RBC 4.81 4.50 - 5.90 x10*6/uL    Hemoglobin 15.3 13.5 - 17.5 g/dL    Hematocrit 46.3 41.0 - 52.0 %    MCV 96 80 - 100 fL    MCH 31.8 26.0 - 34.0 pg    MCHC 33.0 32.0 - 36.0 g/dL    RDW 13.0 11.5 - 14.5 %    Platelets 156 150 - 450 x10*3/uL    Neutrophils % 81.4 40.0 - 80.0 %    Immature Granulocytes %, Automated 0.6 0.0 - 0.9 %    Lymphocytes % 10.8 13.0 - 44.0 %    Monocytes % 6.7 2.0 - 10.0 %    Eosinophils % 0.2 0.0 - 6.0 %    Basophils % 0.3 0.0  - 2.0 %    Neutrophils Absolute 8.49 (H) 1.60 - 5.50 x10*3/uL    Immature Granulocytes Absolute, Automated 0.06 0.00 - 0.50 x10*3/uL    Lymphocytes Absolute 1.13 0.80 - 3.00 x10*3/uL    Monocytes Absolute 0.70 0.05 - 0.80 x10*3/uL    Eosinophils Absolute 0.02 0.00 - 0.40 x10*3/uL    Basophils Absolute 0.03 0.00 - 0.10 x10*3/uL   Comprehensive Metabolic Panel   Result Value Ref Range    Glucose 146 (H) 74 - 99 mg/dL    Sodium 143 136 - 145 mmol/L    Potassium 4.3 3.5 - 5.3 mmol/L    Chloride 111 (H) 98 - 107 mmol/L    Bicarbonate 24 21 - 32 mmol/L    Anion Gap 12 10 - 20 mmol/L    Urea Nitrogen 23 6 - 23 mg/dL    Creatinine 1.15 0.50 - 1.30 mg/dL    eGFR 68 >60 mL/min/1.73m*2    Calcium 9.2 8.6 - 10.3 mg/dL    Albumin 3.5 3.4 - 5.0 g/dL    Alkaline Phosphatase 58 33 - 136 U/L    Total Protein 6.3 (L) 6.4 - 8.2 g/dL    AST 10 9 - 39 U/L    Bilirubin, Total 0.7 0.0 - 1.2 mg/dL    ALT 8 (L) 10 - 52 U/L   Magnesium   Result Value Ref Range    Magnesium 1.91 1.60 - 2.40 mg/dL   B-Type Natriuretic Peptide   Result Value Ref Range     (H) 0 - 99 pg/mL   Protime-INR   Result Value Ref Range    Protime 12.5 9.8 - 12.8 seconds    INR 1.1 0.9 - 1.1   Lipase   Result Value Ref Range    Lipase 134 (H) 9 - 82 U/L   Troponin I, High Sensitivity, Initial   Result Value Ref Range    Troponin I, High Sensitivity 12 0 - 20 ng/L   Troponin, High Sensitivity, 1 Hour   Result Value Ref Range    Troponin I, High Sensitivity 217 (HH) 0 - 20 ng/L   aPTT - baseline   Result Value Ref Range    aPTT 89 (H) 27 - 38 seconds   Protime-INR   Result Value Ref Range    Protime 14.2 (H) 9.8 - 12.8 seconds    INR 1.3 (H) 0.9 - 1.1   Lipid Panel   Result Value Ref Range    Cholesterol 156 0 - 199 mg/dL    HDL-Cholesterol 36.9 mg/dL    Cholesterol/HDL Ratio 4.2     LDL Calculated 100 (H) <=99 mg/dL    VLDL 19 0 - 40 mg/dL    Triglycerides 97 0 - 149 mg/dL    Non HDL Cholesterol 119 0 - 149 mg/dL   Basic Metabolic Panel   Result Value Ref Range     Glucose 119 (H) 74 - 99 mg/dL    Sodium 142 136 - 145 mmol/L    Potassium 4.2 3.5 - 5.3 mmol/L    Chloride 111 (H) 98 - 107 mmol/L    Bicarbonate 26 21 - 32 mmol/L    Anion Gap 9 (L) 10 - 20 mmol/L    Urea Nitrogen 24 (H) 6 - 23 mg/dL    Creatinine 1.15 0.50 - 1.30 mg/dL    eGFR 68 >60 mL/min/1.73m*2    Calcium 8.7 8.6 - 10.3 mg/dL   CBC   Result Value Ref Range    WBC 7.3 4.4 - 11.3 x10*3/uL    nRBC 0.0 0.0 - 0.0 /100 WBCs    RBC 4.52 4.50 - 5.90 x10*6/uL    Hemoglobin 14.1 13.5 - 17.5 g/dL    Hematocrit 42.8 41.0 - 52.0 %    MCV 95 80 - 100 fL    MCH 31.2 26.0 - 34.0 pg    MCHC 32.9 32.0 - 36.0 g/dL    RDW 13.2 11.5 - 14.5 %    Platelets 154 150 - 450 x10*3/uL   Heparin Assay   Result Value Ref Range    Heparin Unfractionated 1.0 See Comment Below for Therapeutic Ranges IU/mL   Heparin Assay   Result Value Ref Range    Heparin Unfractionated 0.6 See Comment Below for Therapeutic Ranges IU/mL   Troponin I, High Sensitivity   Result Value Ref Range    Troponin I, High Sensitivity 14,127 (HH) 0 - 20 ng/L   Potassium   Result Value Ref Range    Potassium 4.0 3.5 - 5.3 mmol/L   Magnesium   Result Value Ref Range    Magnesium 1.96 1.60 - 2.40 mg/dL   SST TOP   Result Value Ref Range    Extra Tube Hold for add-ons.    Heparin Assay   Result Value Ref Range    Heparin Unfractionated 0.6 See Comment Below for Therapeutic Ranges IU/mL   SST TOP   Result Value Ref Range    Extra Tube Hold for add-ons.    Troponin I, High Sensitivity   Result Value Ref Range    Troponin I, High Sensitivity 14,913 (HH) 0 - 20 ng/L   Basic Metabolic Panel   Result Value Ref Range    Glucose 95 74 - 99 mg/dL    Sodium 141 136 - 145 mmol/L    Potassium 4.0 3.5 - 5.3 mmol/L    Chloride 112 (H) 98 - 107 mmol/L    Bicarbonate 23 21 - 32 mmol/L    Anion Gap 10 10 - 20 mmol/L    Urea Nitrogen 24 (H) 6 - 23 mg/dL    Creatinine 1.15 0.50 - 1.30 mg/dL    eGFR 68 >60 mL/min/1.73m*2    Calcium 8.3 (L) 8.6 - 10.3 mg/dL   CBC   Result Value Ref Range    WBC  6.8 4.4 - 11.3 x10*3/uL    nRBC 0.0 0.0 - 0.0 /100 WBCs    RBC 4.57 4.50 - 5.90 x10*6/uL    Hemoglobin 14.2 13.5 - 17.5 g/dL    Hematocrit 43.6 41.0 - 52.0 %    MCV 95 80 - 100 fL    MCH 31.1 26.0 - 34.0 pg    MCHC 32.6 32.0 - 36.0 g/dL    RDW 13.2 11.5 - 14.5 %    Platelets 142 (L) 150 - 450 x10*3/uL   ACTIVATED CLOTTING TIME LOW   Result Value Ref Range    POCT Activated Clotting Time Low Range 390 (H) 83 - 199 sec   ECG 12 lead   Result Value Ref Range    Ventricular Rate 60 BPM    Atrial Rate 44 BPM    QRS Duration 166 ms    QT Interval 402 ms    QTC Calculation(Bazett) 402 ms    R Axis 4 degrees    T Axis 174 degrees    QRS Count 10 beats    Q Onset 215 ms    T Offset 416 ms    QTC Fredericia 402 ms   ECG 12 lead   Result Value Ref Range    Ventricular Rate 60 BPM    Atrial Rate 60 BPM    [place] Interval 152 ms    QRS Duration 178 ms    QT Interval 474 ms    QTC Calculation(Bazett) 474 ms    P Axis -3 degrees    R Axis -15 degrees    T Axis 108 degrees    QRS Count 10 beats    Q Onset 205 ms    T Offset 442 ms    QTC Fredericia 474 ms   ECG 12 lead   Result Value Ref Range    Ventricular Rate 68 BPM    Atrial Rate 55 BPM    [place] Interval 226 ms    QRS Duration 166 ms    QT Interval 478 ms    QTC Calculation(Bazett) 508 ms    P Axis 29 degrees    R Axis 9 degrees    T Axis 203 degrees    QRS Count 10 beats    Q Onset 210 ms    T Offset 449 ms    QTC Fredericia 498 ms   Electrocardiogram 12 Lead   Result Value Ref Range    Ventricular Rate 60 BPM    Atrial Rate 60 BPM    [place] Interval 152 ms    QRS Duration 124 ms    QT Interval 454 ms    QTC Calculation(Bazett) 454 ms    P Axis -8 degrees    R Axis 46 degrees    T Axis -51 degrees    QRS Count 10 beats    Q Onset 208 ms    P Onset 132 ms    P Offset 180 ms    T Offset 435 ms    QTC Fredericia 454 ms     ECG 12 lead (Clinic Performed)    Result Date: 9/12/2024  See documentation      Diagnostic study results (if any) were reviewed by Erin Bo,  APRN-CNP.    Assessment/Plan   Allergies, medications, history, and pertinent labs/EKGs/Imaging reviewed by JC Palma.     Medical Decision Making      Orders and Diagnoses  Diagnoses and all orders for this visit:  Acute pain of left knee  Acute gout of left knee, unspecified cause      Medical Admin Record      Follow Up Instructions  No follow-ups on file.    Patient disposition: Home    Electronically signed by JC Palma  6:07 PM

## 2024-09-12 NOTE — PATIENT INSTRUCTIONS
Please call 911 or return to local emergency department for any chest pain, shortness of breath or palpitations.    Please continue to take medications as prescribed    Monitor blood pressure and weights daily    Call the office with any problems

## 2024-09-12 NOTE — PROGRESS NOTES
CARDIOLOGY OFFICE VISIT      CHIEF COMPLAINT  Chief Complaint   Patient presents with    Hospital Follow-up     TCM from [facility] for NSTEMI discharged 9/7/2024       HISTORY OF PRESENT ILLNESS  69-year-old male with past medical history of nonischemic cardiomyopathy, status post ICD placement with history of LVEF of 15 to 20% later recovered to 45 to 50% with biventricular pacing and optimal heart therapy, hypertension hyperlipidemia who presents to the office today after undergoing PCI to his RCA for chest pain.  He was admitted with NSTEMI and troponin continued to trend up.  He had 95% stenosis of the mid RCA, mild disease of the left coronary system and LVEF of 35%.  Successful PCI with 1 NINFA placed to the mid RCA reducing that lesion down to 0% stenosis.  He was placed on Brilinta along with Eliquis and continue aspirin for 1 week.  The patient was doing well today.  Denies any chest pain or increased shortness of breath.  He currently does complain of left knee pain which he thinks could be a gout flareup versus musculoskeletal.  He is taking all his medications appropriately.  His blood pressure seem to be on the high side with a systolic of 130 so we did talk about increasing his Entresto.              Past Medical History  Past Medical History:   Diagnosis Date    Acute maxillary sinusitis, unspecified 07/27/2020    Subacute maxillary sinusitis    Cellulitis of right finger 06/03/2019    Paronychia of finger of right hand    CHF (congestive heart failure) (Multi)     Encounter for immunization     Encounter for immunization    Encounter for screening for lipoid disorders 12/03/2019    Screening, lipid    Encounter for screening for malignant neoplasm of colon 05/13/2021    Encounter for screening for colorectal malignant neoplasm    Encounter for screening for malignant neoplasm of prostate 12/03/2019    Screening PSA (prostate specific antigen)    Hypertension     Impingement syndrome of left shoulder      Impingement syndrome of left shoulder    Personal history of other diseases of the musculoskeletal system and connective tissue     History of arthritis    Personal history of other drug therapy 05/28/2020    History of pneumococcal vaccination    Personal history of other specified conditions 08/29/2019    History of fatigue       Social History  Social History     Tobacco Use    Smoking status: Never     Passive exposure: Never    Smokeless tobacco: Never   Substance Use Topics    Alcohol use: Never    Drug use: Never       Family History     Family History   Problem Relation Name Age of Onset    Diabetes type II Mother      Hypertension Mother      Diabetes Mother      Other (CEREBRALVASCULAR ACCIDENT) Mother      Hypertension Father      Other (URINARY BLADDER CA) Father          Allergies:  No Known Allergies     Outpatient Medications:  Current Outpatient Medications   Medication Instructions    acetaminophen (TYLENOL) 500 mg, oral, Every 6 hours PRN    apixaban (Eliquis) 5 mg tablet TAKE 1 TABLET BY MOUTH TWO TIMES A DAY    aspirin 81 mg, oral, Daily    atorvastatin (LIPITOR) 40 mg, oral, Nightly    Brilinta 90 mg, oral, 2 times daily    empagliflozin (JARDIANCE) 10 mg, oral, Daily    Entresto 24-26 mg tablet 1 tablet, oral, 2 times daily    metoprolol succinate XL (TOPROL-XL) 25 mg, oral, Daily, Do not crush or chew.    nitroglycerin (NITROSTAT) 0.4 mg, sublingual, Every 5 min PRN    spironolactone (ALDACTONE) 12.5 mg, oral, Every other day    tamsulosin (FLOMAX) 0.8 mg, oral, 2 times daily          REVIEW OF SYSTEMS  Review of Systems   Constitutional: Negative.   HENT: Negative.     Eyes: Negative.    Cardiovascular:  Negative for chest pain, dyspnea on exertion, near-syncope, orthopnea, palpitations and paroxysmal nocturnal dyspnea.   Respiratory: Negative.     Endocrine: Negative.    Skin: Negative.    Musculoskeletal:         L knee pain   Gastrointestinal: Negative.          VITALS  Vitals:     09/12/24 1501   BP: 132/70   Pulse: 84       PHYSICAL EXAM  Constitutional:       Appearance: Healthy appearance. Not in distress.   Neck:      Vascular: No JVR. JVD normal.   Pulmonary:      Effort: Pulmonary effort is normal.      Breath sounds: Normal breath sounds. No wheezing. No rhonchi. No rales.   Chest:      Chest wall: Not tender to palpatation.   Cardiovascular:      PMI at left midclavicular line. Normal rate. Regular rhythm. Normal S1. Normal S2.       Murmurs: There is no murmur.      No gallop.  No click. No rub.   Pulses:     Intact distal pulses.   Edema:     Peripheral edema absent.   Abdominal:      General: Bowel sounds are normal.      Palpations: Abdomen is soft.      Tenderness: There is no abdominal tenderness.   Musculoskeletal: Normal range of motion.         General: No tenderness. Skin:     General: Skin is warm and dry.   Neurological:      General: No focal deficit present.      Mental Status: Alert and oriented to person, place and time.           ASSESSMENT AND PLAN  Diagnoses and all orders for this visit:  Abnormal EKG  -     ECG 12 lead (Clinic Performed)  Cardiomyopathy, unspecified type (Multi)  -     sacubitriL-valsartan (Entresto) 49-51 mg tablet; Take 1 tablet by mouth 2 times a day.  NSTEMI (non-ST elevated myocardial infarction) (Multi)  -     ECG 12 lead (Clinic Performed)  -     ticagrelor (Brilinta) 90 mg tablet; Take 1 tablet (90 mg) by mouth 2 times a day.    Doing well posthospitalization for NSTEMI with PCI to the RCA.  Still has ICD.  EF of around 35 to 40%.  Still slightly hypertensive with systolics in the 130s and 140s.  We plan to increase his Entresto to the next dose and he is agreeable to this.    He is severely complaining of left knee pain which is affecting his ambulation.  I did instruct him to make an appointment with his PCP and also orthopedics to rule out any type of injury.  He denies falling or anything like that he thinks that maybe is  gout.    Tolerating GDMT therapy well with no side effects.  Patient will follow-up with Dr. Ellington and his EP specialist in the next months.      Giorgi Gutierrez [facility]  Adult Gerontology Acute Care Nurse Practitioner   [facility]  Pager: [phone]    [unfilled]    **Disclaimer: This note was dictated by speech recognition, and every effort has been made to prevent any error in transcription, however minor errors may be present**

## 2024-09-13 ASSESSMENT — ENCOUNTER SYMPTOMS
PALPITATIONS: 0
RESPIRATORY NEGATIVE: 1
ENDOCRINE NEGATIVE: 1
DYSPNEA ON EXERTION: 0
CONSTITUTIONAL NEGATIVE: 1
EYES NEGATIVE: 1
ORTHOPNEA: 0
GASTROINTESTINAL NEGATIVE: 1
NEAR-SYNCOPE: 0
PND: 0

## 2024-09-14 ENCOUNTER — PATIENT MESSAGE (OUTPATIENT)
Dept: CARDIOLOGY | Facility: CLINIC | Age: 69
End: 2024-09-14
Payer: COMMERCIAL

## 2024-09-14 DIAGNOSIS — I42.9 CARDIOMYOPATHY, UNSPECIFIED TYPE (MULTI): ICD-10-CM

## 2024-09-16 ENCOUNTER — APPOINTMENT (OUTPATIENT)
Dept: PRIMARY CARE | Facility: CLINIC | Age: 69
End: 2024-09-16
Payer: MEDICARE

## 2024-09-16 VITALS
HEIGHT: 75 IN | RESPIRATION RATE: 16 BRPM | BODY MASS INDEX: 30.59 KG/M2 | WEIGHT: 246 LBS | SYSTOLIC BLOOD PRESSURE: 136 MMHG | OXYGEN SATURATION: 99 % | HEART RATE: 81 BPM | TEMPERATURE: 97.7 F | DIASTOLIC BLOOD PRESSURE: 78 MMHG

## 2024-09-16 DIAGNOSIS — Z23 NEED FOR IMMUNIZATION AGAINST INFLUENZA: ICD-10-CM

## 2024-09-16 DIAGNOSIS — I42.9 CARDIOMYOPATHY, UNSPECIFIED TYPE (MULTI): ICD-10-CM

## 2024-09-16 DIAGNOSIS — E66.09 CLASS 1 OBESITY DUE TO EXCESS CALORIES WITHOUT SERIOUS COMORBIDITY WITH BODY MASS INDEX (BMI) OF 30.0 TO 30.9 IN ADULT: ICD-10-CM

## 2024-09-16 DIAGNOSIS — I21.4 NSTEMI (NON-ST ELEVATED MYOCARDIAL INFARCTION) (MULTI): ICD-10-CM

## 2024-09-16 DIAGNOSIS — I50.9 CHF (NYHA CLASS III, ACC/AHA STAGE C) (MULTI): Primary | ICD-10-CM

## 2024-09-16 DIAGNOSIS — Z09 HOSPITAL DISCHARGE FOLLOW-UP: Primary | ICD-10-CM

## 2024-09-16 PROCEDURE — G0008 ADMIN INFLUENZA VIRUS VAC: HCPCS | Performed by: FAMILY MEDICINE

## 2024-09-16 PROCEDURE — 1111F DSCHRG MED/CURRENT MED MERGE: CPT | Performed by: FAMILY MEDICINE

## 2024-09-16 PROCEDURE — 1036F TOBACCO NON-USER: CPT | Performed by: FAMILY MEDICINE

## 2024-09-16 PROCEDURE — 3008F BODY MASS INDEX DOCD: CPT | Performed by: FAMILY MEDICINE

## 2024-09-16 PROCEDURE — 1170F FXNL STATUS ASSESSED: CPT | Performed by: FAMILY MEDICINE

## 2024-09-16 PROCEDURE — 1158F ADVNC CARE PLAN TLK DOCD: CPT | Performed by: FAMILY MEDICINE

## 2024-09-16 PROCEDURE — 1159F MED LIST DOCD IN RCRD: CPT | Performed by: FAMILY MEDICINE

## 2024-09-16 PROCEDURE — 99495 TRANSJ CARE MGMT MOD F2F 14D: CPT | Performed by: FAMILY MEDICINE

## 2024-09-16 PROCEDURE — 1123F ACP DISCUSS/DSCN MKR DOCD: CPT | Performed by: FAMILY MEDICINE

## 2024-09-16 PROCEDURE — 90662 IIV NO PRSV INCREASED AG IM: CPT | Performed by: FAMILY MEDICINE

## 2024-09-16 PROCEDURE — G0439 PPPS, SUBSEQ VISIT: HCPCS | Performed by: FAMILY MEDICINE

## 2024-09-16 ASSESSMENT — ACTIVITIES OF DAILY LIVING (ADL)
GROCERY_SHOPPING: INDEPENDENT
DOING_HOUSEWORK: INDEPENDENT
MANAGING_FINANCES: INDEPENDENT
DRESSING: INDEPENDENT
BATHING: INDEPENDENT
TAKING_MEDICATION: INDEPENDENT

## 2024-09-16 ASSESSMENT — PATIENT HEALTH QUESTIONNAIRE - PHQ9
2. FEELING DOWN, DEPRESSED OR HOPELESS: NOT AT ALL
SUM OF ALL RESPONSES TO PHQ9 QUESTIONS 1 AND 2: 0
SUM OF ALL RESPONSES TO PHQ9 QUESTIONS 1 AND 2: 0
1. LITTLE INTEREST OR PLEASURE IN DOING THINGS: NOT AT ALL
1. LITTLE INTEREST OR PLEASURE IN DOING THINGS: NOT AT ALL
2. FEELING DOWN, DEPRESSED OR HOPELESS: NOT AT ALL

## 2024-09-16 NOTE — PROGRESS NOTES
"Subjective   Reason for Visit: Alex Alba is an 69 y.o. male here for a Medicare Wellness visit.     Past Medical, Surgical, and Family History reviewed and updated in chart.    Reviewed all medications by prescribing practitioner or clinical pharmacist (such as prescriptions, OTCs, herbal therapies and supplements) and documented in the medical record.    HPI    Patient Care Team:  Rodney Flores MD as PCP - General  SELENA Vanessa-CNP as Nurse Practitioner (Cardiology)  Dale Ellington MD as Cardiologist (Cardiology)  Purvi Valencia LPN as Care Manager (Case Management)  Deborah Lawler MD as Cardiologist (Electrophysiology)  SELENA Vazquez-CNP as Nurse Practitioner (Cardiology)     Review of Systems    Objective   Vitals:  /78   Pulse 81   Temp 36.5 °C (97.7 °F)   Resp 16   Ht 1.905 m (6' 3\")   Wt 112 kg (246 lb)   SpO2 99%   BMI 30.75 kg/m²       Physical Exam    Assessment & Plan  Hospital discharge follow-up         NSTEMI (non-ST elevated myocardial infarction) (Multi)         Cardiomyopathy, unspecified type (Multi)         Class 1 obesity due to excess calories without serious comorbidity with body mass index (BMI) of 30.0 to 30.9 in adult         Need for immunization against influenza    Orders:    Flu vaccine, trivalent, preservative free, HIGH-DOSE, age 65y+ (Fluzone)            "

## 2024-09-16 NOTE — PROGRESS NOTES
HPI: Alex Alba is a 69 y.o. male presenting for follow-up of Medicare Annual Wellness Visit Subsequent and Hospital Follow-up   I last saw the patient 6/3/2024    Patient is here for a AWV & ER F/U     Pt was hospitalized from 9/5-9/7  DX: NSTEMI (non-ST elevated myocardial infarction) (Multi)               hospital time line:  Admitted with NSTEMI with troponin trending up.  Patient was started with heparin drip.  Cardiology saw the patient underwent left heart cath that revealed severe 95% stenosis in the mid RCA, mild disease of the left coronary system and an LVEF of 35%. Patient underwent successful PCI with 1 NINFA placed to the mid RCA reducing that lesion down to 0% stenosis.  Cardiology recommending to continue with Brilinta twice daily and continue with Eliquis and her aspirin for 1 week.  The patient is doing well today denies any further chest pains.  Cardiology has cleared the patient for discharge.  The patient was stable for discharge and outpatient follow-up     He continues taking Eliquis 5 mg. He has discontinued aspirin.     He recently had gout and was on colchicine 0.6 mg and prednisone 20 mg that he completed.    Past medical, surgical, and family history reviewed.  Reviewed and documented all medications   Pt eating well, exercising as tolerated and taking medications as directed    ROS    Except positives as noted in the CC & HPI   Constitutional: Denies fevers, chills, night sweats, fatigue, weight changes, change in appetite   Eyes: Denies blurry vision, double vision   ENT: Denies otalgia, trouble hearing, tinnitus, vertigo, nasal congestion, rhinorrhea, sore throat   Neck: Denies swelling, masses   Cardiovascular: Denies chest pain, palpitations, edema, orthopnea, syncope   Respiratory: Denies dyspnea, cough, wheezing, postural nocturnal dyspnea   Gastrointestinal: Denies abdominal pain, nausea, vomiting, diarrhea, constipation, melena, hematochezia   Genitourinary: Denies  "dysuria, hematuria, frequency, urgency   Musculoskeletal: Denies back pain, neck pain, arthralgias, myalgias   Integumentary: Denies skin lesions, rashes, masses   Neurological: Denies dizziness, headaches, confusion, limb weakness, paresthesias, syncope, convulsions   Psychiatric: Denies depression, anxiety, homicidal ideations, suicidal ideations, sleep disturbances   Endocrine: Denies polyphagia, polydipsia, polyuria, weakness, hair thinning, heat intolerance, cold intolerance, weight changes   Heme/Lymph: Denies easy bruising, easy bleeding, swollen glands     Vitals:    09/16/24 1419 09/16/24 1718   BP: 133/80 136/78   Pulse: 81    Resp: 16    Temp: 36.5 °C (97.7 °F)    SpO2: 99%    Weight: 112 kg (246 lb)    Height: 1.905 m (6' 3\")        PHYSICAL EXAM    GENERAL APPEARANCE: well-developed, well-nourished, 69 y.o. male in no acute distress.  SKIN: warm, pink and dry without rash or concerning lesions.  MENTAL STATUS: alert and oriented × 3. Normal mood and affect appropriate to mood.  NECK: supple without lymphadenopathy. Carotid pulses are normal without bruits. Thyroid - is normal in midline without nodules.  CHEST: lungs are clear to auscultation without rales, rhonchi or wheezes.  Left upper chest reveals a well-healed AICD site.  Nontender to palpation.  HEART: regular, rate and rhythm without murmurs, rubs or gallops.  ABDOMEN: soft, flat, nondistended. No masses, hepatomegaly or splenomegaly is noted.  EXTREMITIES: no cyanosis, clubbing or edema. Pedal pulses are 2+ normal at the dorsalis pedis and posterior tibial pulses bilaterally.  NEUROLOGICAL: cranial nerves II through XII are grossly intact. Motor strength 5/5 at all fours.       Below is the patient's most recent value for Albumin, ALT, AST, BUN, Calcium, Chloride, Cholesterol, CO2, Creatinine, GFR, Glucose, HDL, Hematocrit, Hemoglobin, Hemoglobin A1C, LDL, Magnesium, Phosphorus, Platelets, Potassium, PSA, Sodium, Triglycerides, and WBC.   Lab " Results   Component Value Date    ALBUMIN 3.5 2024    ALT 8 (L) 2024    AST 10 2024    BUN 24 (H) 2024    CALCIUM 8.3 (L) 2024     (H) 2024    CHOL 156 2024    CO2 23 2024    CREATININE 1.15 2024    HDL 36.9 2024    HCT 43.6 2024    HGB 14.2 2024    HGBA1C 5.8 (H) 2024    MG 1.96 2024    PHOS 2.6 2023     (L) 2024    K 4.0 2024    PSA 4.97 (H) 2024     2024    TRIG 97 2024    WBC 6.8 2024         ASSESSMENT:  1. Hospital discharge follow-up        2. NSTEMI (non-ST elevated myocardial infarction) (Multi)        3. Cardiomyopathy, unspecified type (Multi)        4. Class 1 obesity due to excess calories without serious comorbidity with body mass index (BMI) of 30.0 to 30.9 in adult        5. Need for immunization against influenza  Flu vaccine, trivalent, preservative free, HIGH-DOSE, age 65y+ (Fluzone)            PLAN:  Patient to continue current medications (with any exceptions as noted) and diet. Follow-up on December 3 as scheduled, otherwise as needed.     ACP was addressed and he was encouraged to bring the living will to scan into the chart.     The patient received the high-dose flu vaccine today. He will get the RSV and Covid-19 vaccine later this month.    Follow-up with his cardiologist as scheduled.     Scribe Attestation  By signing my name below, IErick Scribe   attest that this documentation has been prepared under the direction and in the presence of Rodney Flores MD.   Patient: Alex Alba  : 1955  PCP: Rodney Flores MD  MRN: [id number]  Program: Transitional Care Management  Status: Enrolled  Effective Dates: 2024 - present  Responsible Staff: Purvi Valencia LPN  Social Determinants to be Addressed: No information to display         Alex MACI Alba is a 69 y.o. male presenting today for follow-up after being  "discharged from the hospital 9 days ago. The main problem requiring admission was Chest Pain. The discharge summary and/or Transitional Care Management documentation was reviewed. Medication reconciliation was performed as indicated via the \"Ti as Reviewed\" timestamp.     Alex LUX Elliottryliemanny was contacted by Transitional Care Management services two days after his discharge. This encounter and supporting documentation was reviewed.    Review of Systems    /78   Pulse 81   Temp 36.5 °C (97.7 °F)   Resp 16   Ht 1.905 m (6' 3\")   Wt 112 kg (246 lb)   SpO2 99%   BMI 30.75 kg/m²     Physical Exam    The complexity of medical decision making for this patient's transitional care is high.    Assessment/Plan   Diagnoses and all orders for this visit:  Hospital discharge follow-up  NSTEMI (non-ST elevated myocardial infarction) (Multi)  Cardiomyopathy, unspecified type (Multi)  Class 1 obesity due to excess calories without serious comorbidity with body mass index (BMI) of 30.0 to 30.9 in adult  Need for immunization against influenza  -     Flu vaccine, trivalent, preservative free, HIGH-DOSE, age 65y+ (Fluzone)      "

## 2024-09-19 ENCOUNTER — PATIENT OUTREACH (OUTPATIENT)
Dept: PRIMARY CARE | Facility: CLINIC | Age: 69
End: 2024-09-19
Payer: COMMERCIAL

## 2024-09-19 NOTE — PROGRESS NOTES
Call regarding appt. with PCP 9/16/24 7 Cardio 9/12/24 after hospitalization.  At time of outreach call the patient feels as if their condition has improved since last visit.  Reviewed the appointments with the pt and addressed any questions or concerns.

## 2024-09-30 DIAGNOSIS — N13.8 BPH WITH OBSTRUCTION/LOWER URINARY TRACT SYMPTOMS: ICD-10-CM

## 2024-09-30 DIAGNOSIS — N40.1 BPH WITH OBSTRUCTION/LOWER URINARY TRACT SYMPTOMS: ICD-10-CM

## 2024-09-30 RX ORDER — TAMSULOSIN HYDROCHLORIDE 0.4 MG/1
0.4 CAPSULE ORAL 2 TIMES DAILY
Qty: 180 CAPSULE | Refills: 3 | Status: SHIPPED | OUTPATIENT
Start: 2024-09-30

## 2024-10-16 ENCOUNTER — PATIENT OUTREACH (OUTPATIENT)
Dept: PRIMARY CARE | Facility: CLINIC | Age: 69
End: 2024-10-16
Payer: COMMERCIAL

## 2024-10-18 ENCOUNTER — HOSPITAL ENCOUNTER (OUTPATIENT)
Dept: CARDIOLOGY | Facility: HOSPITAL | Age: 69
Discharge: HOME | End: 2024-10-18
Payer: MEDICARE

## 2024-10-18 DIAGNOSIS — Z95.810 ICD (IMPLANTABLE CARDIOVERTER-DEFIBRILLATOR) IN PLACE: ICD-10-CM

## 2024-10-18 PROCEDURE — 93296 REM INTERROG EVL PM/IDS: CPT

## 2024-10-21 ENCOUNTER — APPOINTMENT (OUTPATIENT)
Dept: CARDIOLOGY | Facility: CLINIC | Age: 69
End: 2024-10-21
Payer: COMMERCIAL

## 2024-10-21 VITALS
BODY MASS INDEX: 30.49 KG/M2 | WEIGHT: 245.2 LBS | DIASTOLIC BLOOD PRESSURE: 72 MMHG | HEIGHT: 75 IN | SYSTOLIC BLOOD PRESSURE: 114 MMHG

## 2024-10-21 DIAGNOSIS — I25.5 ISCHEMIC CARDIOMYOPATHY: ICD-10-CM

## 2024-10-21 DIAGNOSIS — E66.811 CLASS 1 OBESITY DUE TO EXCESS CALORIES WITHOUT SERIOUS COMORBIDITY WITH BODY MASS INDEX (BMI) OF 30.0 TO 30.9 IN ADULT: ICD-10-CM

## 2024-10-21 DIAGNOSIS — I25.10 CORONARY ARTERY DISEASE INVOLVING NATIVE CORONARY ARTERY OF NATIVE HEART WITHOUT ANGINA PECTORIS: ICD-10-CM

## 2024-10-21 DIAGNOSIS — E66.09 CLASS 1 OBESITY DUE TO EXCESS CALORIES WITHOUT SERIOUS COMORBIDITY WITH BODY MASS INDEX (BMI) OF 30.0 TO 30.9 IN ADULT: ICD-10-CM

## 2024-10-21 DIAGNOSIS — I21.4 NSTEMI (NON-ST ELEVATED MYOCARDIAL INFARCTION) (MULTI): ICD-10-CM

## 2024-10-21 DIAGNOSIS — Z95.5 STATUS POST CORONARY ARTERY STENT PLACEMENT: ICD-10-CM

## 2024-10-21 DIAGNOSIS — E78.2 MIXED HYPERLIPIDEMIA: ICD-10-CM

## 2024-10-21 DIAGNOSIS — R03.0 PREHYPERTENSION: ICD-10-CM

## 2024-10-21 DIAGNOSIS — Z78.9 NEVER SMOKED TOBACCO: ICD-10-CM

## 2024-10-21 PROCEDURE — 1159F MED LIST DOCD IN RCRD: CPT | Performed by: INTERNAL MEDICINE

## 2024-10-21 PROCEDURE — 1123F ACP DISCUSS/DSCN MKR DOCD: CPT | Performed by: INTERNAL MEDICINE

## 2024-10-21 PROCEDURE — 1036F TOBACCO NON-USER: CPT | Performed by: INTERNAL MEDICINE

## 2024-10-21 PROCEDURE — 3008F BODY MASS INDEX DOCD: CPT | Performed by: INTERNAL MEDICINE

## 2024-10-21 PROCEDURE — 99214 OFFICE O/P EST MOD 30 MIN: CPT | Performed by: INTERNAL MEDICINE

## 2024-10-21 ASSESSMENT — ENCOUNTER SYMPTOMS
CONSTITUTIONAL NEGATIVE: 1
CARDIOVASCULAR NEGATIVE: 1
RESPIRATORY NEGATIVE: 1
NEUROLOGICAL NEGATIVE: 1

## 2024-10-21 NOTE — PATIENT INSTRUCTIONS
Continue same medications and treatments.   Patient educated on proper medication use.   Patient educated on risk factor modification.   Please bring any lab results from other providers / physicians to your next appointment.     Please bring all medicines, vitamins, and herbal supplements with you when you come to the office.     Prescriptions will not be filled unless you are compliant with your follow up appointments or have a follow up appointment scheduled as per instruction of your physician. Refills should be requested at the time of your visit.  Echo six month  6 month visit

## 2024-10-21 NOTE — PROGRESS NOTES
CARDIOLOGY OFFICE VISIT      CHIEF COMPLAINT  Chief Complaint   Patient presents with    Follow-up     6 MONTH FOLLOW UP. Pt also had cath with NINFA placement 9/2024        HISTORY OF PRESENT ILLNESS  Alex Alba is a 69 y.o. year old male patient with a history of nonischemic cardiomyopathy and normal cardiac catheterization in February 2023 with initial EF of 10 to 15% and dilated (0.4 cm, but follow-up cardiac MRI showed normal LV cavity with EF of 22%.  S/p BiV AICD implantation and follow-up echo shows improved EF to 45 to 50% in April 2024.  He was admitted in September 24 with non-STEMI and had cardiac catheterization which showed 95% mid RCA stenosis treated with drug-eluting stent.  His EF was noted to be about 35% on the cardiac catheterization.  He has been doing well since then with no recurrent chest pain.  He denies orthopnea proximal nocturnal dyspnea or shortness of breath.    ASSESSMENT AND PLAN  1.  Non-STEMI: With a recent RCA stent as noted above, with no recurrent chest pain.  Will continue with current dual antiplatelet therapy.  2.  Nonischemic cardiomyopathy: With what appears to be a slight decline in his EF based on his cardiac catheterization.  We will wait at least 6 months post revascularization and get a repeat echocardiogram for LVEF reevaluation.  Continue current GDMT.  3.  Hypertension, blood pressure is well-controlled on his prior occasions which we will continue.  4.  Dyslipidemia: Continue with lipid-lowering therapy    Problem List Items Addressed This Visit       Prehypertension    Class 1 obesity due to excess calories without serious comorbidity with body mass index (BMI) of 30.0 to 30.9 in adult    Cardiomyopathy    Never smoked tobacco    NSTEMI (non-ST elevated myocardial infarction) (Multi)    CAD (coronary artery disease)    Status post coronary artery stent placement    Mixed hyperlipidemia       Recent Cardiovascular Testing:    Echo-  Stress-  Cath-  Carotid  Ultrasound-    Past Medical History  Past Medical History:   Diagnosis Date    Acute maxillary sinusitis, unspecified 07/27/2020    Subacute maxillary sinusitis    Cellulitis of right finger 06/03/2019    Paronychia of finger of right hand    CHF (congestive heart failure)     Encounter for immunization     Encounter for immunization    Encounter for screening for lipoid disorders 12/03/2019    Screening, lipid    Encounter for screening for malignant neoplasm of colon 05/13/2021    Encounter for screening for colorectal malignant neoplasm    Encounter for screening for malignant neoplasm of prostate 12/03/2019    Screening PSA (prostate specific antigen)    Hypertension     Impingement syndrome of left shoulder     Impingement syndrome of left shoulder    Personal history of other diseases of the musculoskeletal system and connective tissue     History of arthritis    Personal history of other drug therapy 05/28/2020    History of pneumococcal vaccination    Personal history of other specified conditions 08/29/2019    History of fatigue       Social History  Social History     Tobacco Use    Smoking status: Never     Passive exposure: Never    Smokeless tobacco: Never   Substance Use Topics    Alcohol use: Never    Drug use: Never       Family History     Family History   Problem Relation Name Age of Onset    Diabetes type II Mother      Hypertension Mother      Diabetes Mother      Other (CEREBRALVASCULAR ACCIDENT) Mother      Hypertension Father      Other (URINARY BLADDER CA) Father          Allergies:  No Known Allergies     Outpatient Medications:  Current Outpatient Medications   Medication Instructions    acetaminophen (TYLENOL) 500 mg, Every 6 hours PRN    apixaban (Eliquis) 5 mg tablet TAKE 1 TABLET BY MOUTH TWO TIMES A DAY    atorvastatin (LIPITOR) 40 mg, oral, Nightly    empagliflozin (JARDIANCE) 10 mg, oral, Daily    metoprolol succinate XL (TOPROL-XL) 25 mg, oral, Daily, Do not crush or chew.     nitroglycerin (NITROSTAT) 0.4 mg, sublingual, Every 5 min PRN    sacubitriL-valsartan (Entresto) 49-51 mg tablet 1 tablet, oral, 2 times daily    spironolactone (ALDACTONE) 12.5 mg, oral, Every other day    tamsulosin (FLOMAX) 0.4 mg, oral, 2 times daily    ticagrelor (BRILINTA) 90 mg, oral, 2 times daily        Recent Lab Results:    CBC:   Lab Results   Component Value Date    WBC 6.8 09/07/2024    RBC 4.57 09/07/2024    HGB 14.2 09/07/2024    HCT 43.6 09/07/2024     (L) 09/07/2024        CMP:    Lab Results   Component Value Date     09/07/2024    K 4.0 09/07/2024     (H) 09/07/2024    CO2 23 09/07/2024    BUN 24 (H) 09/07/2024    CREATININE 1.15 09/07/2024    GLUCOSE 95 09/07/2024    CALCIUM 8.3 (L) 09/07/2024       Magnesium:    Lab Results   Component Value Date    MG 1.96 09/06/2024       Lipid Profile:    Lab Results   Component Value Date    TRIG 97 09/06/2024    HDL 36.9 09/06/2024    LDLCALC 100 (H) 09/06/2024       TSH:    Lab Results   Component Value Date    TSH 2.95 06/29/2023       BNP:   Lab Results   Component Value Date     (H) 09/05/2024        PT/INR:    Lab Results   Component Value Date    PROTIME 14.2 (H) 09/05/2024    INR 1.3 (H) 09/05/2024       HgBA1c:    Lab Results   Component Value Date    HGBA1C 5.8 (H) 05/20/2024       BMP:  Lab Results   Component Value Date     09/07/2024     09/06/2024     09/05/2024    K 4.0 09/07/2024    K 4.2 09/06/2024    K 4.0 09/06/2024     (H) 09/07/2024     (H) 09/06/2024     (H) 09/05/2024    CO2 23 09/07/2024    CO2 26 09/06/2024    CO2 24 09/05/2024    BUN 24 (H) 09/07/2024    BUN 24 (H) 09/06/2024    BUN 23 09/05/2024    CREATININE 1.15 09/07/2024    CREATININE 1.15 09/06/2024    CREATININE 1.15 09/05/2024       CBC:  Lab Results   Component Value Date    WBC 6.8 09/07/2024    WBC 7.3 09/06/2024    WBC 10.4 09/05/2024    RBC 4.57 09/07/2024    RBC 4.52 09/06/2024    RBC 4.81 09/05/2024    HGB  14.2 09/07/2024    HGB 14.1 09/06/2024    HGB 15.3 09/05/2024    HCT 43.6 09/07/2024    HCT 42.8 09/06/2024    HCT 46.3 09/05/2024    MCV 95 09/07/2024    MCV 95 09/06/2024    MCV 96 09/05/2024    MCH 31.1 09/07/2024    MCH 31.2 09/06/2024    MCH 31.8 09/05/2024    MCHC 32.6 09/07/2024    MCHC 32.9 09/06/2024    MCHC 33.0 09/05/2024    RDW 13.2 09/07/2024    RDW 13.2 09/06/2024    RDW 13.0 09/05/2024     (L) 09/07/2024     09/06/2024     09/05/2024       Cardiac Enzymes:    Lab Results   Component Value Date    TROPHS 14,913 (HH) 09/06/2024    TROPHS 14,127 (HH) 09/06/2024    TROPHS 217 (HH) 09/05/2024       Hepatic Function Panel:    Lab Results   Component Value Date    ALKPHOS 58 09/05/2024    ALT 8 (L) 09/05/2024    AST 10 09/05/2024    PROT 6.3 (L) 09/05/2024    BILITOT 0.7 09/05/2024         REVIEW OF SYSTEMS  Review of Systems   Constitutional: Negative.   Cardiovascular: Negative.    Respiratory: Negative.     Neurological: Negative.    All other systems reviewed and are negative.      VITALS  Vitals:    10/21/24 0938   BP: 114/72     Wt Readings from Last 4 Encounters:   10/21/24 111 kg (245 lb 3.2 oz)   09/16/24 112 kg (246 lb)   09/12/24 111 kg (245 lb)   09/12/24 112 kg (246 lb 14.4 oz)       PHYSICAL EXAM  Constitutional:       Appearance: Healthy appearance.   Eyes:      Pupils: Pupils are equal, round, and reactive to light.   Pulmonary:      Effort: Pulmonary effort is normal.      Breath sounds: Normal breath sounds.   Cardiovascular:      PMI at left midclavicular line. Normal rate. Regular rhythm.      Murmurs: There is no murmur.      No gallop.  No click. No rub.   Pulses:     Intact distal pulses.   Musculoskeletal: Normal range of motion.      Cervical back: Normal range of motion. Skin:     General: Skin is warm and dry.   Neurological:      General: No focal deficit present.      Mental Status: Alert and oriented to person, place and time.

## 2024-10-30 ENCOUNTER — OFFICE VISIT (OUTPATIENT)
Dept: URGENT CARE | Age: 69
End: 2024-10-30
Payer: MEDICARE

## 2024-10-30 VITALS
RESPIRATION RATE: 20 BRPM | BODY MASS INDEX: 30.09 KG/M2 | WEIGHT: 242 LBS | OXYGEN SATURATION: 94 % | HEIGHT: 75 IN | DIASTOLIC BLOOD PRESSURE: 63 MMHG | SYSTOLIC BLOOD PRESSURE: 95 MMHG | TEMPERATURE: 97.5 F | HEART RATE: 81 BPM

## 2024-10-30 DIAGNOSIS — M76.61 ACHILLES TENDINITIS OF RIGHT LOWER EXTREMITY: Primary | ICD-10-CM

## 2024-10-30 PROCEDURE — 1036F TOBACCO NON-USER: CPT | Performed by: FAMILY MEDICINE

## 2024-10-30 PROCEDURE — 1160F RVW MEDS BY RX/DR IN RCRD: CPT | Performed by: FAMILY MEDICINE

## 2024-10-30 PROCEDURE — 3008F BODY MASS INDEX DOCD: CPT | Performed by: FAMILY MEDICINE

## 2024-10-30 PROCEDURE — 99213 OFFICE O/P EST LOW 20 MIN: CPT | Performed by: FAMILY MEDICINE

## 2024-10-30 PROCEDURE — 1159F MED LIST DOCD IN RCRD: CPT | Performed by: FAMILY MEDICINE

## 2024-10-30 PROCEDURE — 1123F ACP DISCUSS/DSCN MKR DOCD: CPT | Performed by: FAMILY MEDICINE

## 2024-10-30 PROCEDURE — 1125F AMNT PAIN NOTED PAIN PRSNT: CPT | Performed by: FAMILY MEDICINE

## 2024-10-30 ASSESSMENT — ENCOUNTER SYMPTOMS
WHEEZING: 0
COUGH: 0
MYALGIAS: 1
WEAKNESS: 0
CHILLS: 0
JOINT SWELLING: 0
COLOR CHANGE: 0
ARTHRALGIAS: 1
SHORTNESS OF BREATH: 0
FEVER: 0
CHEST TIGHTNESS: 0
NUMBNESS: 0

## 2024-10-30 ASSESSMENT — PAIN SCALES - GENERAL: PAINLEVEL_OUTOF10: 4

## 2024-11-08 ENCOUNTER — PATIENT OUTREACH (OUTPATIENT)
Dept: CARDIOLOGY | Facility: CLINIC | Age: 69
End: 2024-11-08
Payer: MEDICARE

## 2024-12-03 ENCOUNTER — APPOINTMENT (OUTPATIENT)
Dept: PRIMARY CARE | Facility: CLINIC | Age: 69
End: 2024-12-03
Payer: COMMERCIAL

## 2024-12-03 VITALS
OXYGEN SATURATION: 95 % | DIASTOLIC BLOOD PRESSURE: 52 MMHG | BODY MASS INDEX: 30.96 KG/M2 | HEART RATE: 68 BPM | RESPIRATION RATE: 16 BRPM | SYSTOLIC BLOOD PRESSURE: 94 MMHG | HEIGHT: 75 IN | TEMPERATURE: 97.2 F | WEIGHT: 249 LBS

## 2024-12-03 DIAGNOSIS — I42.9 CARDIOMYOPATHY, UNSPECIFIED TYPE (MULTI): ICD-10-CM

## 2024-12-03 DIAGNOSIS — E66.09 CLASS 1 OBESITY DUE TO EXCESS CALORIES WITHOUT SERIOUS COMORBIDITY WITH BODY MASS INDEX (BMI) OF 31.0 TO 31.9 IN ADULT: ICD-10-CM

## 2024-12-03 DIAGNOSIS — R03.0 PREHYPERTENSION: Primary | ICD-10-CM

## 2024-12-03 DIAGNOSIS — I21.4 NSTEMI (NON-ST ELEVATED MYOCARDIAL INFARCTION) (MULTI): ICD-10-CM

## 2024-12-03 DIAGNOSIS — R73.9 HYPERGLYCEMIA: ICD-10-CM

## 2024-12-03 DIAGNOSIS — J06.9 ACUTE URI: ICD-10-CM

## 2024-12-03 DIAGNOSIS — R97.20 ELEVATED PSA: ICD-10-CM

## 2024-12-03 DIAGNOSIS — E66.811 CLASS 1 OBESITY DUE TO EXCESS CALORIES WITHOUT SERIOUS COMORBIDITY WITH BODY MASS INDEX (BMI) OF 31.0 TO 31.9 IN ADULT: ICD-10-CM

## 2024-12-03 PROCEDURE — 1123F ACP DISCUSS/DSCN MKR DOCD: CPT | Performed by: FAMILY MEDICINE

## 2024-12-03 PROCEDURE — 1036F TOBACCO NON-USER: CPT | Performed by: FAMILY MEDICINE

## 2024-12-03 PROCEDURE — 99214 OFFICE O/P EST MOD 30 MIN: CPT | Performed by: FAMILY MEDICINE

## 2024-12-03 PROCEDURE — 1159F MED LIST DOCD IN RCRD: CPT | Performed by: FAMILY MEDICINE

## 2024-12-03 PROCEDURE — G2211 COMPLEX E/M VISIT ADD ON: HCPCS | Performed by: FAMILY MEDICINE

## 2024-12-03 PROCEDURE — 3008F BODY MASS INDEX DOCD: CPT | Performed by: FAMILY MEDICINE

## 2024-12-03 PROCEDURE — 1160F RVW MEDS BY RX/DR IN RCRD: CPT | Performed by: FAMILY MEDICINE

## 2024-12-03 ASSESSMENT — PATIENT HEALTH QUESTIONNAIRE - PHQ9
1. LITTLE INTEREST OR PLEASURE IN DOING THINGS: NOT AT ALL
2. FEELING DOWN, DEPRESSED OR HOPELESS: NOT AT ALL
SUM OF ALL RESPONSES TO PHQ9 QUESTIONS 1 AND 2: 0

## 2024-12-03 NOTE — PROGRESS NOTES
Subjective   Patient ID: Alex Alba is a 69 y.o. male who presents for Follow-up (Prehypertension; Gastroesophageal reflux disease without esophagitis; BPH with obstruction/lower urinary tract symptoms; Fibromyalgia/). I last saw the patient on 9/16/2024.     HPI     Patient is here for a F/U    Past medical, surgical, and family history reviewed.  Reviewed and documented all medications   Pt eating well, exercising as tolerated and taking medications as directed    Patient does complains of cold symptoms being from Sunday 12/01/2024 with nasal drainage and cough. He denies fever/ SOB/ ear aches/ headaches.      Review of Systems  Except positives as noted in the CC & HPI      Constitutional: Denies fevers, chills, night sweats, fatigue, weight changes, change in appetite    Eyes: Denies blurry vision, double vision    ENT: Denies otalgia, trouble hearing, tinnitus, vertigo, nasal congestion, rhinorrhea, sore throat    Neck: Denies swelling, masses    Cardiovascular: Denies chest pain, palpitations, edema, orthopnea, syncope    Respiratory: Denies dyspnea, cough, wheezing, postural nocturnal dyspnea    Gastrointestinal: Denies abdominal pain, nausea, vomiting, diarrhea, constipation, melena, hematochezia    Genitourinary: Denies dysuria, hematuria, frequency, urgency    Musculoskeletal: Denies back pain, neck pain, arthralgias, myalgias    Integumentary: Denies skin lesions, rashes, masses    Neurological: Denies dizziness, headaches, confusion, limb weakness, paresthesias, syncope, convulsions    Psychiatric: Denies depression, anxiety, homicidal ideations, suicidal ideations, sleep disturbances    Endocrine: Denies polyphagia, polydipsia, polyuria, weakness, hair thinning, heat intolerance, cold intolerance, weight changes    Heme/Lymph: Denies easy bruising, easy bleeding, swollen glands     Objective   BP 94/52 (BP Location: Right arm, Patient Position: Sitting, BP Cuff Size: Adult long)   Pulse 68    "Temp 36.2 °C (97.2 °F)   Resp 16   Ht 1.905 m (6' 3\")   Wt 113 kg (249 lb)   SpO2 95%   BMI 31.12 kg/m²     Physical Exam    General Appearance - well-developed, well-nourished, 69 y.o., White male in no acute distress.     Skin - warm, pink and dry without rash or concerning lesions. Resolution of the skin lesions involving the right nasal bridge and malar area.     Mental Status - alert and oriented x 3. Normal mood and affect appropriate to mood.     Ears - TMs shiny and move well with insufflation. Ear canals are clear bilaterally.     Nose - nasal passages are clear bilaterally without bleeding. Clear nasal drainage+.    Mouth - pharynx is pink without exudates. Dentition is normal appearing. Tongue and uvula move in the midline.     Neck - supple without lymphadenopathy. Carotid pulses are normal without bruits. Thyroid is normal in midline without nodules.     Chest - lungs are clear to auscultation without rales, rhonchi or wheezes.     Heart - regular, rate and rhythm without murmurs, rubs or gallops.    Abdomen - soft, obese, protuberant, nontender, nondistended. No masses, hepatomegaly or splenomegaly is noted. No rebound, rigidity or guarding is noted. Bowel sounds are normoactive.     Extremities - no cyanosis, clubbing or edema. Pedal pulses are 2+ normal at the dorsalis pedis and posterior pulses bilaterally.    Neurological - cranial nerves II through XII are grossly intact. Motor strength 5/5 at all fours.     Assessment/Plan   1. Prehypertension  CBC and Auto Differential    Comprehensive Metabolic Panel      2. Cardiomyopathy, unspecified type (Multi)        3. Acute URI        4. Hyperglycemia  Hemoglobin A1C      5. NSTEMI (non-ST elevated myocardial infarction) (Multi)  Lipid Panel      6. Class 1 obesity due to excess calories without serious comorbidity with body mass index (BMI) of 31.0 to 31.9 in adult        7. Elevated PSA          Patient to continue current medications (with any " exceptions as noted) and diet. Follow-up in 6 month(s) otherwise as needed.      Patient is to return for fasting CBC and Auto Differential, Comprehensive Metabolic Panel, Lipid profile, Hemoglobin A1c labs at their convenience prior to their next appointment. Fasting is nothing to eat or drink except water or black coffee for 8-12 hours. Will call patient with results when available.     Patient was instructed to drink plenty of fluids. Take Tylenol for pain or fever. Follow up if signs or symptoms persist or worsen otherwise when necessary. Patient may take Mucinex DM OTC or Coricidin HBP as directed for cough and Flonase (fluticasone) nasal spray OTC as directed for nasal and sinus congestion.    Patient is to follow up with Dr. Ellington (cardio), Dr. Hagan (urology) as scheduled.     Scribe Attestation  By signing my name below, Waleska HYMAN , López   attest that this documentation has been prepared under the direction and in the presence of Rodney Flores MD.

## 2024-12-06 ENCOUNTER — TELEPHONE (OUTPATIENT)
Dept: CARDIOLOGY | Facility: CLINIC | Age: 69
End: 2024-12-06
Payer: MEDICARE

## 2024-12-06 NOTE — TELEPHONE ENCOUNTER
Called patient and spoke to him regarding his appointment with [facility] with a device check 01/08. I informed that due to [facility] resigning we had to cancel the appointment and device check and would reschedule with . I informed him we would call with the new date and time for the appointment.

## 2024-12-09 ENCOUNTER — TELEPHONE (OUTPATIENT)
Dept: CARDIOLOGY | Facility: CLINIC | Age: 69
End: 2024-12-09
Payer: MEDICARE

## 2024-12-10 ENCOUNTER — OFFICE VISIT (OUTPATIENT)
Dept: CARDIOLOGY | Facility: CLINIC | Age: 69
End: 2024-12-10
Payer: MEDICARE

## 2024-12-10 ENCOUNTER — HOSPITAL ENCOUNTER (OUTPATIENT)
Dept: CARDIOLOGY | Facility: HOSPITAL | Age: 69
Discharge: HOME | End: 2024-12-10
Payer: MEDICARE

## 2024-12-10 VITALS
DIASTOLIC BLOOD PRESSURE: 60 MMHG | HEIGHT: 75 IN | WEIGHT: 243 LBS | HEART RATE: 80 BPM | BODY MASS INDEX: 30.21 KG/M2 | SYSTOLIC BLOOD PRESSURE: 114 MMHG

## 2024-12-10 DIAGNOSIS — I44.7 LEFT BUNDLE BRANCH BLOCK (LBBB): ICD-10-CM

## 2024-12-10 DIAGNOSIS — Z95.810 ICD (IMPLANTABLE CARDIOVERTER-DEFIBRILLATOR) IN PLACE: ICD-10-CM

## 2024-12-10 DIAGNOSIS — Z71.89 ENCOUNTER FOR MEDICATION REVIEW AND COUNSELING: ICD-10-CM

## 2024-12-10 DIAGNOSIS — Z71.2 ENCOUNTER TO DISCUSS TEST RESULTS: ICD-10-CM

## 2024-12-10 DIAGNOSIS — I42.8 LEFT VENTRICULAR NON-COMPACTION CARDIOMYOPATHY (MULTI): ICD-10-CM

## 2024-12-10 DIAGNOSIS — Z78.9 NEVER SMOKED TOBACCO: ICD-10-CM

## 2024-12-10 DIAGNOSIS — I21.4 NSTEMI (NON-ST ELEVATED MYOCARDIAL INFARCTION) (MULTI): ICD-10-CM

## 2024-12-10 DIAGNOSIS — Z95.810 ICD (IMPLANTABLE CARDIOVERTER-DEFIBRILLATOR) IN PLACE: Primary | ICD-10-CM

## 2024-12-10 PROBLEM — E66.811 CLASS 1 OBESITY DUE TO EXCESS CALORIES WITHOUT SERIOUS COMORBIDITY WITH BODY MASS INDEX (BMI) OF 31.0 TO 31.9 IN ADULT: Status: RESOLVED | Noted: 2023-06-01 | Resolved: 2024-12-10

## 2024-12-10 PROBLEM — E66.09 CLASS 1 OBESITY DUE TO EXCESS CALORIES WITHOUT SERIOUS COMORBIDITY WITH BODY MASS INDEX (BMI) OF 31.0 TO 31.9 IN ADULT: Status: RESOLVED | Noted: 2023-06-01 | Resolved: 2024-12-10

## 2024-12-10 PROCEDURE — 1123F ACP DISCUSS/DSCN MKR DOCD: CPT | Performed by: INTERNAL MEDICINE

## 2024-12-10 PROCEDURE — 93284 PRGRMG EVAL IMPLANTABLE DFB: CPT | Performed by: INTERNAL MEDICINE

## 2024-12-10 PROCEDURE — 1036F TOBACCO NON-USER: CPT | Performed by: INTERNAL MEDICINE

## 2024-12-10 PROCEDURE — 93284 PRGRMG EVAL IMPLANTABLE DFB: CPT

## 2024-12-10 PROCEDURE — 99214 OFFICE O/P EST MOD 30 MIN: CPT | Performed by: INTERNAL MEDICINE

## 2024-12-10 PROCEDURE — 93000 ELECTROCARDIOGRAM COMPLETE: CPT | Mod: DISTINCT PROCEDURAL SERVICE | Performed by: INTERNAL MEDICINE

## 2024-12-10 PROCEDURE — 1159F MED LIST DOCD IN RCRD: CPT | Performed by: INTERNAL MEDICINE

## 2024-12-10 PROCEDURE — 3008F BODY MASS INDEX DOCD: CPT | Performed by: INTERNAL MEDICINE

## 2024-12-10 ASSESSMENT — ENCOUNTER SYMPTOMS
SHORTNESS OF BREATH: 0
ORTHOPNEA: 0
NEAR-SYNCOPE: 0
SNORING: 0
IRREGULAR HEARTBEAT: 0
SYNCOPE: 0
WHEEZING: 0
COUGH: 0
PND: 0
CARDIOVASCULAR NEGATIVE: 1
CLAUDICATION: 0

## 2024-12-10 NOTE — PROGRESS NOTES
"Chief Complaint:   Follow-up (Patient is present for 6 month follow up with device check  )     History Of Present Illness:    Alex Alba is a 69 y.o. male presenting with follow-up.     Patient denies any arrhythmia symptoms of palpitation, lightheadedness, near syncope, or syncope.    He had NSTEMI September 2024 with 95% mid RCA stenosis status post drug-eluting stent.  Ejection fraction 35% at time of catheterization.    Last Recorded Vitals:  Vitals:    12/10/24 0931   BP: 114/60   BP Location: Right arm   Patient Position: Sitting   Pulse: 80   Weight: 110 kg (243 lb)   Height: 1.905 m (6' 3\")       Past Medical History:  See list  Past Surgical History:  See list    Social History:  He reports that he has never smoked. He has never been exposed to tobacco smoke. He has never used smokeless tobacco. He reports that he does not drink alcohol and does not use drugs.    Family History:  Family History   Problem Relation Name Age of Onset    Diabetes type II Mother      Hypertension Mother      Diabetes Mother      Other (CEREBRALVASCULAR ACCIDENT) Mother      Hypertension Father      Other (URINARY BLADDER CA) Father          Allergies:  Patient has no known allergies.    Outpatient Medications:  Current Outpatient Medications   Medication Instructions    acetaminophen (TYLENOL) 500 mg, Every 6 hours PRN    apixaban (Eliquis) 5 mg tablet TAKE 1 TABLET BY MOUTH TWO TIMES A DAY    atorvastatin (LIPITOR) 40 mg, oral, Nightly    empagliflozin (JARDIANCE) 10 mg, oral, Daily    metoprolol succinate XL (TOPROL-XL) 25 mg, oral, Daily, Do not crush or chew.    nitroglycerin (NITROSTAT) 0.4 mg, sublingual, Every 5 min PRN    sacubitriL-valsartan (Entresto) 49-51 mg tablet 1 tablet, oral, 2 times daily    spironolactone (ALDACTONE) 12.5 mg, oral, Every other day    tamsulosin (FLOMAX) 0.4 mg, oral, 2 times daily    ticagrelor (BRILINTA) 90 mg, oral, 2 times daily   Review of Systems   Constitutional: Negative for " malaise/fatigue.   Cardiovascular: Negative.  Negative for claudication, cyanosis, irregular heartbeat, leg swelling, near-syncope, orthopnea, paroxysmal nocturnal dyspnea and syncope.   Respiratory:  Negative for cough, shortness of breath, snoring and wheezing.    All other systems reviewed and are negative.        Physical Exam:  Constitutional:       Appearance: Normal and healthy appearance. Well-developed and not in distress.      Comments: Left sided device well healed   Neck:      Vascular: No JVR. JVD normal.   Pulmonary:      Effort: Pulmonary effort is normal.      Breath sounds: Normal breath sounds. No wheezing. No rhonchi. No rales.   Chest:      Chest wall: Not tender to palpatation.   Cardiovascular:      PMI at left midclavicular line. Normal rate. Regular rhythm. Normal S1. Normal S2.       Murmurs: There is no murmur.      No gallop.  No click. No rub.   Pulses:     Intact distal pulses.   Edema:     Peripheral edema absent.   Abdominal:      Tenderness: There is no abdominal tenderness.   Musculoskeletal: Normal range of motion.         General: No tenderness. Skin:     General: Skin is warm and dry.   Neurological:      General: No focal deficit present.      Mental Status: Alert and oriented to person, place and time.            Last Labs:  CBC -  Lab Results   Component Value Date    WBC 6.8 09/07/2024    HGB 14.2 09/07/2024    HCT 43.6 09/07/2024    MCV 95 09/07/2024     (L) 09/07/2024       CMP -  Lab Results   Component Value Date    CALCIUM 8.3 (L) 09/07/2024    PHOS 2.6 07/31/2023    PROT 6.3 (L) 09/05/2024    ALBUMIN 3.5 09/05/2024    AST 10 09/05/2024    ALT 8 (L) 09/05/2024    ALKPHOS 58 09/05/2024    BILITOT 0.7 09/05/2024       LIPID PANEL -   Lab Results   Component Value Date    CHOL 156 09/06/2024    TRIG 97 09/06/2024    HDL 36.9 09/06/2024    CHHDL 4.2 09/06/2024    LDLF 108 (H) 06/01/2023    VLDL 19 09/06/2024    NHDL 119 09/06/2024       RENAL FUNCTION PANEL -   Lab  Results   Component Value Date    GLUCOSE 95 09/07/2024     09/07/2024    K 4.0 09/07/2024     (H) 09/07/2024    CO2 23 09/07/2024    ANIONGAP 10 09/07/2024    BUN 24 (H) 09/07/2024    CREATININE 1.15 09/07/2024    GFRMALE CANCELED 09/20/2023    CALCIUM 8.3 (L) 09/07/2024    PHOS 2.6 07/31/2023    ALBUMIN 3.5 09/05/2024        Lab Results   Component Value Date     (H) 09/05/2024    HGBA1C 5.8 (H) 05/20/2024       Last Cardiology Tests:  ECG:  ECG 12 lead (Clinic Performed) 09/12/2024    Today.  Appropriate sensing and pacing.  Normal axis.  Corrected QT interval 440 ms.        Device evaluation today.  [employer] A500 Q BiV ICD.  Estimated longevity device over 4 years.  97% BiV pacing.  14% atrial pacing.  0 A-fib.  0 VT    Echo:  Transthoracic Echo (TTE) Complete 04/03/2024      Cath:  Cardiac Catheterization Procedure 09/06/2024      Stress Test:  Nuclear Stress Test 01/16/2023      Cardiac Imaging:  MR CARDIAC MORPHOLOGY AND FUNCTION W AND WO IV CONTRAST       Lab review: I have personally reviewed the laboratory result(s) see above    Assessment/Plan   Diagnoses and all orders for this visit:  ICD (implantable cardioverter-defibrillator) in place  Left bundle branch block (LBBB)  Left ventricular non-compaction cardiomyopathy (Multi)  NSTEMI (non-ST elevated myocardial infarction) (Multi)  Never smoked tobacco  Body mass index (BMI) 30.0-30.9, adult  Encounter for medication review and counseling  Encounter to discuss test results        Heidi Cárdenas LPN    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 LVEF 15 to 20% and most recently 35% at time of catheterization September 2024. Dilated left ventricle at 6.4 cm. NYHA class III C chronic systolic heart failure. Left bundle branch block.  Status post biventricular defibrillator.  [employer]  Q biventricular ICD.  Normal device function.  Reviewed device check with patient.  Ordered device checks.  Continue  to alternate remote check with device clinic.  1 device clinic will be paired with annual EP office visit.  Device on field alert.  Discussed field alert with patient.  Monitor device in device clinic as per protocol.  Coronary artery disease, recent NSTEMI, chronic ischemic heart disease hypertension and hyperlipidemia as per primary cardiology  Tall stature at 6 foot 3 inches.   AHA recommendations for exercise, diet, and behavioral modification reviewed with pt.     Counseling over 50% visit performed. The patient and I discussed the mechanism of arrhythmia, bradycardia, tachycardia, biventricular defibrillator, remodeling of the heart, last ejection fraction, what NSTEMI means regarding blockage and no presence of sudden cardiac arrest, how defibrillator works, how biventricular pacing via defibrillator works, indications for and types of medications, discussion if and what medication refills needed, treatment options, risks, benefits, and imponderables. American Heart Association lifestyle changes and behavioral modification discussed. All questions answered in detail. Patient appreciative of care.

## 2024-12-10 NOTE — PATIENT INSTRUCTIONS
Follow up with Dr. Lawler in 1 year  Remote device checks will occur at 3 and 9 month intervals.  In clinic device checks are to be done at 6 months, and then at 12 months, same day as your appointment  Continue same medications and treatments.   Patient educated on proper medication use.   Patient educated on risk factor modification.   Please bring any lab results from other providers / physicians to your next appointment.     Please bring all medicines, vitamins, and herbal supplements with you when you come to the office.     Prescriptions will not be filled unless you are compliant with your follow up appointments or have a follow up appointment scheduled as per instruction of your physician. Refills should be requested at the time of your visit.  IMCKINLEY LPN, AM SCRIBING FOR AND IN THE PRESENCE OF DR. RADHA LAWLER MD, FACC, FACP, RS

## 2025-01-08 ENCOUNTER — APPOINTMENT (OUTPATIENT)
Dept: CARDIOLOGY | Facility: CLINIC | Age: 70
End: 2025-01-08
Payer: MEDICARE

## 2025-01-08 ENCOUNTER — APPOINTMENT (OUTPATIENT)
Dept: CARDIOLOGY | Facility: HOSPITAL | Age: 70
End: 2025-01-08
Payer: COMMERCIAL

## 2025-01-09 DIAGNOSIS — R03.0 PREHYPERTENSION: ICD-10-CM

## 2025-01-09 DIAGNOSIS — I50.9 CHF (NYHA CLASS III, ACC/AHA STAGE C) (MULTI): ICD-10-CM

## 2025-01-09 NOTE — TELEPHONE ENCOUNTER
Received request for prescription refills for patient.   Patient follows with Dr. Ellington    Request is for Toprol XL  Is patient currently on medication yes    Last OV 10/21/2024  Next OV 4/21/2025    Pended for signing and sent to provider

## 2025-01-10 RX ORDER — METOPROLOL SUCCINATE 25 MG/1
25 TABLET, EXTENDED RELEASE ORAL DAILY
Qty: 90 TABLET | Refills: 3 | Status: SHIPPED | OUTPATIENT
Start: 2025-01-10

## 2025-01-13 DIAGNOSIS — I51.3 LV (LEFT VENTRICULAR) MURAL THROMBUS: ICD-10-CM

## 2025-01-13 NOTE — TELEPHONE ENCOUNTER
Received request for prescription refills for patient.   Patient follows with Dr. Ellington    Request is for Eliquis  Is patient currently on medication yes    Last OV 10/21/2024  Next OV 4/21/2025    Pended for signing and sent to provider

## 2025-01-15 ENCOUNTER — HOSPITAL ENCOUNTER (EMERGENCY)
Facility: HOSPITAL | Age: 70
Discharge: HOME | End: 2025-01-15
Payer: MEDICARE

## 2025-01-15 ENCOUNTER — OFFICE VISIT (OUTPATIENT)
Dept: ORTHOPEDIC SURGERY | Facility: CLINIC | Age: 70
End: 2025-01-15
Payer: MEDICARE

## 2025-01-15 ENCOUNTER — APPOINTMENT (OUTPATIENT)
Dept: RADIOLOGY | Facility: HOSPITAL | Age: 70
End: 2025-01-15
Payer: MEDICARE

## 2025-01-15 VITALS
RESPIRATION RATE: 17 BRPM | BODY MASS INDEX: 29.84 KG/M2 | OXYGEN SATURATION: 97 % | TEMPERATURE: 98.1 F | SYSTOLIC BLOOD PRESSURE: 138 MMHG | DIASTOLIC BLOOD PRESSURE: 69 MMHG | HEIGHT: 75 IN | WEIGHT: 240 LBS | HEART RATE: 86 BPM

## 2025-01-15 DIAGNOSIS — M17.12 LOCALIZED OSTEOARTHRITIS OF LEFT KNEE: ICD-10-CM

## 2025-01-15 DIAGNOSIS — M25.462 EFFUSION OF LEFT KNEE: ICD-10-CM

## 2025-01-15 DIAGNOSIS — M11.20 CHONDROCALCINOSIS: Primary | ICD-10-CM

## 2025-01-15 LAB — URATE SERPL-MCNC: 6 MG/DL (ref 4–7.5)

## 2025-01-15 PROCEDURE — 1123F ACP DISCUSS/DSCN MKR DOCD: CPT | Performed by: INTERNAL MEDICINE

## 2025-01-15 PROCEDURE — 99213 OFFICE O/P EST LOW 20 MIN: CPT | Performed by: INTERNAL MEDICINE

## 2025-01-15 PROCEDURE — 73564 X-RAY EXAM KNEE 4 OR MORE: CPT | Mod: LT

## 2025-01-15 PROCEDURE — 36415 COLL VENOUS BLD VENIPUNCTURE: CPT | Performed by: REGISTERED NURSE

## 2025-01-15 PROCEDURE — 84550 ASSAY OF BLOOD/URIC ACID: CPT | Performed by: REGISTERED NURSE

## 2025-01-15 PROCEDURE — 99284 EMERGENCY DEPT VISIT MOD MDM: CPT

## 2025-01-15 PROCEDURE — 73564 X-RAY EXAM KNEE 4 OR MORE: CPT | Mod: LEFT SIDE | Performed by: RADIOLOGY

## 2025-01-15 PROCEDURE — 2500000001 HC RX 250 WO HCPCS SELF ADMINISTERED DRUGS (ALT 637 FOR MEDICARE OP): Performed by: REGISTERED NURSE

## 2025-01-15 PROCEDURE — 99203 OFFICE O/P NEW LOW 30 MIN: CPT | Performed by: INTERNAL MEDICINE

## 2025-01-15 RX ORDER — PREDNISONE 20 MG/1
40 TABLET ORAL DAILY
Qty: 10 TABLET | Refills: 0 | Status: SHIPPED | OUTPATIENT
Start: 2025-01-15 | End: 2025-01-20

## 2025-01-15 RX ORDER — ACETAMINOPHEN 325 MG/1
975 TABLET ORAL ONCE
Status: COMPLETED | OUTPATIENT
Start: 2025-01-15 | End: 2025-01-15

## 2025-01-15 RX ADMIN — ACETAMINOPHEN 975 MG: 325 TABLET ORAL at 11:15

## 2025-01-15 ASSESSMENT — PAIN SCALES - GENERAL: PAINLEVEL_OUTOF10: 6

## 2025-01-15 ASSESSMENT — PAIN - FUNCTIONAL ASSESSMENT: PAIN_FUNCTIONAL_ASSESSMENT: 0-10

## 2025-01-15 ASSESSMENT — PAIN DESCRIPTION - PAIN TYPE: TYPE: ACUTE PAIN

## 2025-01-15 ASSESSMENT — PAIN DESCRIPTION - DESCRIPTORS: DESCRIPTORS: ACHING

## 2025-01-15 ASSESSMENT — PAIN DESCRIPTION - ORIENTATION: ORIENTATION: LEFT

## 2025-01-15 ASSESSMENT — PAIN DESCRIPTION - LOCATION: LOCATION: KNEE

## 2025-01-15 NOTE — DISCHARGE INSTRUCTIONS
RICE    Rest: Stop using the injured area and avoid putting weight on it. You may need to use crutches or a splint.   Ice: Apply ice or a cold pack to the injured area as soon as possible to reduce swelling and bleeding. Ice the area for 15 to 20 minutes, 4 to 8 times a day, for the first 48 hours.   Compression: Wrap the injured area with an elastic bandage.   Elevation: Prop up the injured area

## 2025-01-15 NOTE — PROGRESS NOTES
Acute Injury New Patient Visit    CC: No chief complaint on file.      HPI: Alex is a 69 y.o. male presents today for initial evaluation for acute left knee pain and swelling which started two days ago. No trauma or fall. He notes a history of gout. He was evaluated in the ER for chondrocalcinosis/pseudogout. X-rays were taken in the ER. He is here for initial evaluation.        Review of Systems   GENERAL: Negative for malaise, significant weight loss, fever  MUSCULOSKELETAL: See HPI  NEURO:  Negative for numbness / tingling     Past Medical History  Past Medical History:   Diagnosis Date    Acute maxillary sinusitis, unspecified 07/27/2020    Subacute maxillary sinusitis    Cellulitis of right finger 06/03/2019    Paronychia of finger of right hand    CHF (congestive heart failure)     Encounter for immunization     Encounter for immunization    Encounter for screening for lipoid disorders 12/03/2019    Screening, lipid    Encounter for screening for malignant neoplasm of colon 05/13/2021    Encounter for screening for colorectal malignant neoplasm    Encounter for screening for malignant neoplasm of prostate 12/03/2019    Screening PSA (prostate specific antigen)    Hypertension     Impingement syndrome of left shoulder     Impingement syndrome of left shoulder    Personal history of other diseases of the musculoskeletal system and connective tissue     History of arthritis    Personal history of other drug therapy 05/28/2020    History of pneumococcal vaccination    Personal history of other specified conditions 08/29/2019    History of fatigue       Medication review  Medication Documentation Review Audit       Reviewed by Mera Alvarez RN (Registered Nurse) on 01/15/25 at 1023      Medication Order Taking? Sig Documenting Provider Last Dose Status   acetaminophen (Tylenol) 325 mg tablet [id number]  Take 500 mg by mouth every 6 hours if needed for mild pain (1 - 3). Historical Provider, MD  Active   apixaban  (Eliquis) 5 mg tablet [id number]  Take 1 tablet (5 mg) by mouth 2 times a day. Dale Ellington MD  Active   atorvastatin (Lipitor) 40 mg tablet [id number]  Take 1 tablet (40 mg) by mouth once daily at bedtime. Salma Nguyen APRN-CNP  Active   empagliflozin (Jardiance) 10 mg [id number]  Take 1 tablet (10 mg) by mouth once daily. Lexa Kent MD  Active   metoprolol succinate XL (Toprol-XL) 25 mg 24 hr tablet [id number]  TAKE 1 TABLET (25 MG) BY MOUTH DAILY DO NOT CRUSH OR CHEW Dale Ellington MD  Active   nitroglycerin (Nitrostat) 0.4 mg SL tablet [id number]  Place 1 tablet (0.4 mg) under the tongue every 5 minutes if needed for chest pain. SELENA Wilson-CNP  Active   sacubitriL-valsartan (Entresto) 49-51 mg tablet [id number]  Take 1 tablet by mouth 2 times a day. Dale Ellington MD  Active   spironolactone (Aldactone) 25 mg tablet [id number]  Take 0.5 tablets (12.5 mg) by mouth every other day. Jason Frank MD  Active   tamsulosin (Flomax) 0.4 mg 24 hr capsule [id number]  Take 1 capsule (0.4 mg) by mouth 2 times a day. Darwin Hagan MD  Active   ticagrelor (Brilinta) 90 mg tablet [id number]  Take 1 tablet (90 mg) by mouth 2 times a day. Jason Frank MD  Active                    Allergies  No Known Allergies    Social History  Social History     Socioeconomic History    Marital status:      Spouse name: Not on file    Number of children: Not on file    Years of education: Not on file    Highest education level: Not on file   Occupational History    Not on file   Tobacco Use    Smoking status: Never     Passive exposure: Never    Smokeless tobacco: Never   Substance and Sexual Activity    Alcohol use: Never    Drug use: Never    Sexual activity: Defer   Other Topics Concern    Not on file   Social History Narrative    Not on file     Social Drivers of Health     Financial Resource Strain: Low Risk  (9/5/2024)    Overall Financial Resource Strain (CARDIA)     Difficulty of  Paying Living Expenses: Not hard at all   Food Insecurity: Not on file   Transportation Needs: No Transportation Needs (9/7/2024)    PRAPARE - Transportation     Lack of Transportation (Medical): No     Lack of Transportation (Non-Medical): No   Physical Activity: Not on file   Stress: Not on file   Social Connections: Not on file   Intimate Partner Violence: Not on file   Housing Stability: Low Risk  (9/5/2024)    Housing Stability Vital Sign     Unable to Pay for Housing in the Last Year: No     Number of Times Moved in the Last Year: 0     Homeless in the Last Year: No       Surgical History  Past Surgical History:   Procedure Laterality Date    CARDIAC CATHETERIZATION N/A 9/6/2024    Procedure: Left Heart Cath;  Surgeon: Lexa Kent MD;  Location: [facility];  Service: Cardiovascular;  Laterality: N/A;    CARDIAC CATHETERIZATION N/A 9/6/2024    Procedure: PCI;  Surgeon: Lexa Kent MD;  Location: [facility];  Service: Cardiovascular;  Laterality: N/A;    CARDIAC DEFIBRILLATOR PLACEMENT      OTHER SURGICAL HISTORY  10/19/2021    Prostate biopsy    OTHER SURGICAL HISTORY  06/15/2021    Hernia repair    OTHER SURGICAL HISTORY  06/15/2021    Colonoscopy       Physical Exam:  GENERAL:  Patient is awake, alert, and oriented to person place and time.  Patient appears well nourished and well kept.  Affect Calm, Not Acutely Distressed.  HEENT:  Normocephalic, Atraumatic, EOMI  CARDIOVASCULAR:  Hemodynamically stable.  RESPIRATORY:  Normal respirations with unlabored breathing.  Extremity: Left knee examination shows skin is intact.  There is no erythema or warmth.  1+ effusion.  Can flex the right knee to 130 degrees with pain.  Full extension at 0 degrees.  Pain over the medial joint line.  Pain over the lateral joint line.  There is no pain over the patellar or quadricep tendon.  No pain over the proximal tibia.  No pain over the popliteal fossa.  Negative valgus stress test.  Negative varus  stress test.  Negative Obdulia's test medially with no instability.  Negative Obdulia's test laterally with no instability.  Negative Lachman's test.  Patellar and quadricep mechanism intact.  Negative anterior and posterior drawer test.  Negative patellar apprehension test.  Distal pulses are palpable, neurovascularly intact.  Walking with no significant antalgic gait.      Diagnostics: X-rays reviewed  XR knee left 4+ views  Narrative: Interpreted By:  Emani Valenzuela,   STUDY:  XR KNEE LEFT 4+ VIEWS; ;  1/15/2025 10:41 am      INDICATION:  Signs/Symptoms:pain swelling.          COMPARISON:  None.      ACCESSION NUMBER(S):  [id number]      ORDERING CLINICIAN:  JOSE MIGUEL BOYCE      FINDINGS:  Four views left knee:  There is no fracture or dislocation.  There is a moderate-sized suprapatellar joint effusion.  There is a 7 mm calcified loose body in the patellofemoral joint.  There is chondrocalcinosis. If the knee is red, hot and swollen,  consider pseudogout.      Impression: 1. No acute bony abnormality.  2. Moderate-sized suprapatellar joint effusion.  3. Chondrocalcinosis. If the knee is red, hot and swollen, consider  pseudogout.  4. 7 mm calcified loose body in the patellofemoral joint.              MACRO:  None      Signed by: Emani Valenzuela 1/15/2025 10:59 AM  Dictation workstation:   [id number]      Procedure: None    Assessment: Left knee osteoarthritis with chondrocalcinosis    Plan: Alex presents today for initial evaluation for acute left knee pain and swelling which started two days ago secondary to left knee osteoarthritis and chondrocalcinosis.  We did offer oral prednisone or a intra-articular corticosteroid injection.  He like to try the oral steroids first which was prescribed to the emergency room.  He will follow-up in 10 to 14 days, if there is no improvement we may consider an ultrasound-guided intra-articular corticosteroid junction.      No orders of the defined types were placed in  this encounter.     At the conclusion of the visit there were no further questions by the patient/family regarding their plan of care.  Patient was instructed to call or return with any issues, questions, or concerns regarding their injury and/or treatment plan described above.     01/15/25 at 1:09 PM - Kim Bruce MD  Scribe Attestation  By signing my name below, I, Andrea Glaser, Scribe   attest that this documentation has been prepared under the direction and in the presence of Kim Bruce MD.    Office: [phone]    This note was prepared using voice recognition software.  The details of this note are correct and have been reviewed, and corrected to the best of my ability.  Some grammatical errors may persist related to the Dragon software.

## 2025-01-15 NOTE — ED PROVIDER NOTES
HPI   Chief Complaint   Patient presents with    Knee Pain     Non traumatic L knee pain for a few days, hx of gout     69-year-old male presents emergency department today for evaluation of left knee pain.  Patient tells me he is experiences pain for few days.  He tells me he has been taking acetaminophen with minimal relief.  Patient denies any injury or trauma.  Patient tells me he does have a history of gout in his ankles.  Patient denies any fever or chills.  Patient denies any numbness or tingling.  Patient tells me that he is on blood thinners and cannot take ibuprofen.  Patient no other complaints on arrival.      History provided by:  Patient   used: No            Patient History   Past Medical History:   Diagnosis Date    Acute maxillary sinusitis, unspecified 07/27/2020    Subacute maxillary sinusitis    Cellulitis of right finger 06/03/2019    Paronychia of finger of right hand    CHF (congestive heart failure)     Encounter for immunization     Encounter for immunization    Encounter for screening for lipoid disorders 12/03/2019    Screening, lipid    Encounter for screening for malignant neoplasm of colon 05/13/2021    Encounter for screening for colorectal malignant neoplasm    Encounter for screening for malignant neoplasm of prostate 12/03/2019    Screening PSA (prostate specific antigen)    Hypertension     Impingement syndrome of left shoulder     Impingement syndrome of left shoulder    Personal history of other diseases of the musculoskeletal system and connective tissue     History of arthritis    Personal history of other drug therapy 05/28/2020    History of pneumococcal vaccination    Personal history of other specified conditions 08/29/2019    History of fatigue     Past Surgical History:   Procedure Laterality Date    CARDIAC CATHETERIZATION N/A 9/6/2024    Procedure: Left Heart Cath;  Surgeon: Lexa Kent MD;  Location: [facility];  Service:  Cardiovascular;  Laterality: N/A;    CARDIAC CATHETERIZATION N/A 9/6/2024    Procedure: PCI;  Surgeon: Lexa Kent MD;  Location: [facility];  Service: Cardiovascular;  Laterality: N/A;    CARDIAC DEFIBRILLATOR PLACEMENT      OTHER SURGICAL HISTORY  10/19/2021    Prostate biopsy    OTHER SURGICAL HISTORY  06/15/2021    Hernia repair    OTHER SURGICAL HISTORY  06/15/2021    Colonoscopy     Family History   Problem Relation Name Age of Onset    Diabetes type II Mother      Hypertension Mother      Diabetes Mother      Other (CEREBRALVASCULAR ACCIDENT) Mother      Hypertension Father      Other (URINARY BLADDER CA) Father       Social History     Tobacco Use    Smoking status: Never     Passive exposure: Never    Smokeless tobacco: Never   Substance Use Topics    Alcohol use: Never    Drug use: Never       Physical Exam   ED Triage Vitals [01/15/25 1022]   Temperature Heart Rate Respirations BP   36.7 °C (98.1 °F) 86 17 138/69      Pulse Ox Temp Source Heart Rate Source Patient Position   97 % Temporal Monitor --      BP Location FiO2 (%)     -- --       Physical Exam  Vitals and nursing note reviewed.   Constitutional:       Appearance: Normal appearance.   HENT:      Head: Normocephalic and atraumatic.   Cardiovascular:      Rate and Rhythm: Normal rate.      Pulses: Normal pulses.   Abdominal:      Palpations: Abdomen is soft.   Musculoskeletal:      Left knee: Effusion present. Tenderness present over the medial joint line and lateral joint line.   Skin:     General: Skin is warm and dry.      Capillary Refill: Capillary refill takes less than 2 seconds.   Neurological:      General: No focal deficit present.      Mental Status: He is alert and oriented to person, place, and time.   Psychiatric:         Mood and Affect: Mood normal.         Behavior: Behavior normal.           ED Course & MDM   Diagnoses as of 01/15/25 1159   Chondrocalcinosis - left knee   Effusion of left knee                 No data  recorded     [place] Coma Scale Score: 15 (01/15/25 1023 : Mera Alvarez RN)                           Medical Decision Making    Patient seen exam emergency department; patient is healthy nontoxic appearance and does not appear in acute distress.  Patient's respirations are even unlabored, skin is warm and dry no diaphoresis noted.  Patient is neurologically intact on any focal deficits.  Patient does have a steady and independent gait however he has a slight limp noted on the left.  Patient's left knee is tender at the medial and lateral aspects.  Patient has full range of motion of the knee although he tells me it is painful.  Patient does have some swelling noted in the medial aspect.  However the skin is not warm or hot to touch.    Will obtain imaging of left knee to evaluate for osseous abnormality versus effusion.  Will also obtain a uric acid to evaluate for gout.  Patient's symptoms treated with a gram of Tylenol.    Patient C-reactive is 6.  Imaging of right knee does show a moderate-sized suprapatellar effusion of the left knee as well as chondrocalcinosis     On reevaluation patient tells me he has had some improvement in his pain post ministration of acetaminophen.  Patient I did discuss his results.  I did have registration make patient an appointment with orthopedics prior to discharge.  Patient's left knee placed in Ace wrap by bedside nurse, MSPs intact before and after placement.  Patient given a prescription for short course of steroids to treat his chondrocalcinosis.  All patient's questions and concerns were addressed prior to discharge.  Patient discharged home in stable condition.    Labs Reviewed   URIC ACID - Normal       Result Value    Uric Acid 6.0         XR knee left 4+ views   Final Result   1. No acute bony abnormality.   2. Moderate-sized suprapatellar joint effusion.   3. Chondrocalcinosis. If the knee is red, hot and swollen, consider   pseudogout.   4. 7 mm calcified loose body  in the patellofemoral joint.                  MACRO:   None        Signed by: Emani Valenzuela 1/15/2025 10:59 AM   Dictation workstation:   [id number]          Procedure  Procedures     SELENA Mcnally-CNP  01/15/25 1213       SELENA Mcnally-CNP  01/15/25 1213

## 2025-01-16 DIAGNOSIS — I50.9 CHF (NYHA CLASS III, ACC/AHA STAGE C) (MULTI): ICD-10-CM

## 2025-01-18 RX ORDER — EMPAGLIFLOZIN 10 MG/1
10 TABLET, FILM COATED ORAL DAILY
Qty: 90 TABLET | Refills: 3 | Status: SHIPPED | OUTPATIENT
Start: 2025-01-18

## 2025-01-24 ENCOUNTER — APPOINTMENT (OUTPATIENT)
Dept: ORTHOPEDIC SURGERY | Facility: CLINIC | Age: 70
End: 2025-01-24
Payer: MEDICARE

## 2025-02-12 ENCOUNTER — OFFICE VISIT (OUTPATIENT)
Dept: ORTHOPEDIC SURGERY | Facility: CLINIC | Age: 70
End: 2025-02-12
Payer: MEDICARE

## 2025-02-12 ENCOUNTER — HOSPITAL ENCOUNTER (EMERGENCY)
Facility: HOSPITAL | Age: 70
Discharge: HOME | End: 2025-02-12
Payer: MEDICARE

## 2025-02-12 ENCOUNTER — HOSPITAL ENCOUNTER (OUTPATIENT)
Dept: RADIOLOGY | Facility: EXTERNAL LOCATION | Age: 70
Discharge: HOME | End: 2025-02-12

## 2025-02-12 VITALS — WEIGHT: 245 LBS | BODY MASS INDEX: 30.46 KG/M2 | HEIGHT: 75 IN

## 2025-02-12 VITALS
WEIGHT: 245 LBS | OXYGEN SATURATION: 97 % | HEART RATE: 94 BPM | HEIGHT: 75 IN | DIASTOLIC BLOOD PRESSURE: 66 MMHG | BODY MASS INDEX: 30.46 KG/M2 | RESPIRATION RATE: 18 BRPM | TEMPERATURE: 97.7 F | SYSTOLIC BLOOD PRESSURE: 125 MMHG

## 2025-02-12 DIAGNOSIS — M17.12 LOCALIZED OSTEOARTHRITIS OF LEFT KNEE: Primary | ICD-10-CM

## 2025-02-12 DIAGNOSIS — M25.562 LEFT KNEE PAIN, UNSPECIFIED CHRONICITY: Primary | ICD-10-CM

## 2025-02-12 DIAGNOSIS — M25.562 LEFT KNEE PAIN, UNSPECIFIED CHRONICITY: ICD-10-CM

## 2025-02-12 DIAGNOSIS — M11.262 PSEUDOGOUT OF LEFT KNEE: ICD-10-CM

## 2025-02-12 PROCEDURE — 20611 DRAIN/INJ JOINT/BURSA W/US: CPT | Mod: LT | Performed by: STUDENT IN AN ORGANIZED HEALTH CARE EDUCATION/TRAINING PROGRAM

## 2025-02-12 PROCEDURE — 99282 EMERGENCY DEPT VISIT SF MDM: CPT | Mod: 25

## 2025-02-12 PROCEDURE — 1159F MED LIST DOCD IN RCRD: CPT | Performed by: STUDENT IN AN ORGANIZED HEALTH CARE EDUCATION/TRAINING PROGRAM

## 2025-02-12 PROCEDURE — 2500000004 HC RX 250 GENERAL PHARMACY W/ HCPCS (ALT 636 FOR OP/ED): Performed by: STUDENT IN AN ORGANIZED HEALTH CARE EDUCATION/TRAINING PROGRAM

## 2025-02-12 PROCEDURE — 1123F ACP DISCUSS/DSCN MKR DOCD: CPT | Performed by: STUDENT IN AN ORGANIZED HEALTH CARE EDUCATION/TRAINING PROGRAM

## 2025-02-12 PROCEDURE — 99214 OFFICE O/P EST MOD 30 MIN: CPT | Performed by: STUDENT IN AN ORGANIZED HEALTH CARE EDUCATION/TRAINING PROGRAM

## 2025-02-12 PROCEDURE — 99213 OFFICE O/P EST LOW 20 MIN: CPT | Mod: 25 | Performed by: STUDENT IN AN ORGANIZED HEALTH CARE EDUCATION/TRAINING PROGRAM

## 2025-02-12 PROCEDURE — 3008F BODY MASS INDEX DOCD: CPT | Performed by: STUDENT IN AN ORGANIZED HEALTH CARE EDUCATION/TRAINING PROGRAM

## 2025-02-12 PROCEDURE — 99281 EMR DPT VST MAYX REQ PHY/QHP: CPT | Mod: 25

## 2025-02-12 RX ORDER — LIDOCAINE HYDROCHLORIDE 10 MG/ML
6 INJECTION, SOLUTION INFILTRATION; PERINEURAL
Status: COMPLETED | OUTPATIENT
Start: 2025-02-12 | End: 2025-02-12

## 2025-02-12 RX ORDER — BETAMETHASONE SODIUM PHOSPHATE AND BETAMETHASONE ACETATE 3; 3 MG/ML; MG/ML
18 INJECTION, SUSPENSION INTRA-ARTICULAR; INTRALESIONAL; INTRAMUSCULAR; SOFT TISSUE
Status: COMPLETED | OUTPATIENT
Start: 2025-02-12 | End: 2025-02-12

## 2025-02-12 RX ADMIN — BETAMETHASONE ACETATE AND BETAMETHASONE SODIUM PHOSPHATE 18 MG: 3; 3 INJECTION, SUSPENSION INTRA-ARTICULAR; INTRALESIONAL; INTRAMUSCULAR; SOFT TISSUE at 14:43

## 2025-02-12 RX ADMIN — LIDOCAINE HYDROCHLORIDE 6 ML: 10 INJECTION, SOLUTION INFILTRATION; PERINEURAL at 14:43

## 2025-02-12 ASSESSMENT — LIFESTYLE VARIABLES
EVER FELT BAD OR GUILTY ABOUT YOUR DRINKING: NO
HAVE YOU EVER FELT YOU SHOULD CUT DOWN ON YOUR DRINKING: NO
HAVE PEOPLE ANNOYED YOU BY CRITICIZING YOUR DRINKING: NO
TOTAL SCORE: 0
EVER HAD A DRINK FIRST THING IN THE MORNING TO STEADY YOUR NERVES TO GET RID OF A HANGOVER: NO

## 2025-02-12 ASSESSMENT — PAIN DESCRIPTION - LOCATION: LOCATION: KNEE

## 2025-02-12 ASSESSMENT — PAIN - FUNCTIONAL ASSESSMENT: PAIN_FUNCTIONAL_ASSESSMENT: 0-10

## 2025-02-12 ASSESSMENT — PAIN SCALES - GENERAL: PAINLEVEL_OUTOF10: 6

## 2025-02-12 NOTE — ED PROVIDER NOTES
HPI   Chief Complaint   Patient presents with    Knee Pain     Wants prednisone for knee      69-year-old male with past medical history significant for CHF and hypertension presents emergency department today for evaluation of left knee pain.  Patient was originally seen in the ED on 115/25.  Patient was diagnosed with chondrocalcinosis of the left knee.  Patient was seen at the orthopedic same-day clinic.  At that time they did offer him an injection into his left knee however he opted to continue with the oral steroids that were prescribed by the ER.  Patient returns today because he is having pain in his knee again.  Patient tells me he wants more steroids.  Patient denies any new injury or trauma.    HPI        Patient History   Past Medical History:   Diagnosis Date    Acute maxillary sinusitis, unspecified 07/27/2020    Subacute maxillary sinusitis    Cellulitis of right finger 06/03/2019    Paronychia of finger of right hand    CHF (congestive heart failure)     Encounter for immunization     Encounter for immunization    Encounter for screening for lipoid disorders 12/03/2019    Screening, lipid    Encounter for screening for malignant neoplasm of colon 05/13/2021    Encounter for screening for colorectal malignant neoplasm    Encounter for screening for malignant neoplasm of prostate 12/03/2019    Screening PSA (prostate specific antigen)    Hypertension     Impingement syndrome of left shoulder     Impingement syndrome of left shoulder    Personal history of other diseases of the musculoskeletal system and connective tissue     History of arthritis    Personal history of other drug therapy 05/28/2020    History of pneumococcal vaccination    Personal history of other specified conditions 08/29/2019    History of fatigue     Past Surgical History:   Procedure Laterality Date    CARDIAC CATHETERIZATION N/A 9/6/2024    Procedure: Left Heart Cath;  Surgeon: Lexa Kent MD;  Location: [facility];   Service: Cardiovascular;  Laterality: N/A;    CARDIAC CATHETERIZATION N/A 9/6/2024    Procedure: PCI;  Surgeon: Lexa Kent MD;  Location: [facility];  Service: Cardiovascular;  Laterality: N/A;    CARDIAC DEFIBRILLATOR PLACEMENT      OTHER SURGICAL HISTORY  10/19/2021    Prostate biopsy    OTHER SURGICAL HISTORY  06/15/2021    Hernia repair    OTHER SURGICAL HISTORY  06/15/2021    Colonoscopy     Family History   Problem Relation Name Age of Onset    Diabetes type II Mother      Hypertension Mother      Diabetes Mother      Other (CEREBRALVASCULAR ACCIDENT) Mother      Hypertension Father      Other (URINARY BLADDER CA) Father       Social History     Tobacco Use    Smoking status: Never     Passive exposure: Never    Smokeless tobacco: Never   Substance Use Topics    Alcohol use: Never    Drug use: Never       Physical Exam   ED Triage Vitals [02/12/25 1212]   Temperature Heart Rate Respirations BP   36.5 °C (97.7 °F) 94 18 125/66      Pulse Ox Temp Source Heart Rate Source Patient Position   97 % Temporal Monitor Sitting      BP Location FiO2 (%)     Right arm --       Physical Exam  Vitals and nursing note reviewed.   Constitutional:       Appearance: Normal appearance.   HENT:      Head: Normocephalic and atraumatic.   Cardiovascular:      Rate and Rhythm: Normal rate.   Pulmonary:      Effort: Pulmonary effort is normal.   Abdominal:      Palpations: Abdomen is soft.   Musculoskeletal:      Left knee: Swelling and bony tenderness present.   Skin:     General: Skin is warm.      Capillary Refill: Capillary refill takes less than 2 seconds.   Neurological:      General: No focal deficit present.      Mental Status: He is alert.   Psychiatric:         Mood and Affect: Mood normal.           ED Course & MDM   Diagnoses as of 02/12/25 1250   Left knee pain, unspecified chronicity                 No data recorded                                 Medical Decision Making    Patient seen exam emergency  department; patient is healthy nontoxic in appearance and does not appear in acute distress.  Patient's respirations are even unlabored, skin is warm and dry no diaphoresis noted.  Patient is neurologically intact on any focal deficits.  Patient has a steady and independent gait.  Patient currently rates the knee pain at 6/10 on the pain scale.  Patient is able to extend and flex at the knee bilaterally.  Knee is not warm to touch, no erythema.    I did discuss with patient that as it has been less than 30 days for reoccurrence of symptoms I do think that he is better off suited being seen by orthopedics and having the steroid injection that was previously discussed.  I did have registration come make patient an appointment and he has an appointment at 2 PM with orthopedics today.  Patient discharged home in stable condition  Procedure  Procedures     Sabrina Tellez, SELENA-CNP  02/12/25 1397

## 2025-02-12 NOTE — PROGRESS NOTES
Acute Injury Established Patient Visit    HPI: Alex is a 69 y.o.male who presents today with new complaints of left knee pain.  Of note, he saw my partner, Dr. Bruce, on 1/15/2025, and was diagnosed with left knee osteoarthritis as well as chondrocalcinosis/pseudogout.  He was also seen in the emergency department around the same time and given prednisone.  He decided to try a course of oral prednisone, which within 2 days relieved his pain, but unfortunately over the past 2 days his pain has insidiously returned.  He denies any interval falls or injuries.  He denies any significant swelling, bruising, erythema, or warmth.    Plan: For this left knee osteoarthritis versus pseudogout flare, provided him with a corticosteroid injection under ultrasound guidance which was tolerated well without complications.  Postinjection instructions given.  Will follow-up in 6 weeks for further evaluation.  Encouraged patient to see his primary care physician for further evaluation of underlying causes of his pseudogout.  Additionally, discussed conservative treatment measures including rest, ice, elevation, compression, and over-the-counter analgesia as needed and as appropriate.  Risks of NSAID use, steroid use, and muscle relaxers discussed in depth and considered in light of medical comorbidities.  Patient, and parent/guardian as applicable, understand agree with plan.  Follow-up: 6 weeks  X-rays on follow-up: None      Assessment:   Problem List Items Addressed This Visit    None  Visit Diagnoses       Localized osteoarthritis of left knee    -  Primary    Left knee pain, unspecified chronicity        Relevant Orders    Point of Care Ultrasound (Completed)    Pseudogout of left knee                Diagnostics: Reviewed all relevant imaging including x-ray, MRI, CT, and US.      Procedure:  L Inj/Asp: L knee on 2/12/2025 2:43 PM  Indications: pain and joint swelling  Details: 22 G needle, ultrasound-guided superolateral  approach  Medications: 6 mL lidocaine 10 mg/mL (1 %); 18 mg betamethasone acet,sod phos 6 mg/mL  Outcome: tolerated well, no immediate complications  Procedure, treatment alternatives, risks and benefits explained, specific risks discussed. Consent was given by the patient. Immediately prior to procedure a time out was called to verify the correct patient, procedure, equipment, support staff and site/side marked as required. Patient was prepped and draped in the usual sterile fashion.           Physical Exam:  GENERAL:  No obvious acute distress.  NEURO:  Distally neurovascularly intact.  Sensation intact to light touch.  Extremity: Left knee examination shows:  Skin is intact;  No erythema or warmth;  No edema or ecchymosis;  Trace joint effusion;  Can flex the left knee to 130 degrees;  Full extension at 0 degrees;  No pain over the patella;  Negative patellar grind test;  TENDER over the medial joint line more posteriorly;  No pain over the lateral joint line;  No pain over the patellar or quadricep tendon;  No pain over the proximal tibia;  No pain over the popliteal fossa;  Negative valgus stress test;  Negative varus stress test;  Negative Obdulia's test medially with no instability;  Negative Obdulia's test laterally with no instability;  Negative Lachman's test;  Patellar and quadricep mechanism intact;  Negative anterior and posterior drawer test;  Negative patellar apprehension test;  Distal pulses are palpable;  Neurovascularly intact; and  Walking with no significant antalgic gait.    Orders Placed This Encounter    L Inj/Asp    Point of Care Ultrasound      At the conclusion of the visit there were no further questions by the patient/family regarding their plan of care.  Patient was instructed to call or return with any issues, questions, or concerns regarding their injury and/or treatment plan described above.     02/12/25 at 2:46 PM - Ghanshyam Sharp DO    Office: [phone]    This note was  prepared using voice recognition software.  The details of this note are correct and have been reviewed, and corrected to the best of my ability.  Some grammatical errors may persist related to the Dragon software.

## 2025-02-28 DIAGNOSIS — Z98.61 CAD S/P PERCUTANEOUS CORONARY ANGIOPLASTY: ICD-10-CM

## 2025-02-28 DIAGNOSIS — I25.10 CAD S/P PERCUTANEOUS CORONARY ANGIOPLASTY: ICD-10-CM

## 2025-02-28 DIAGNOSIS — E78.5 HLD (HYPERLIPIDEMIA): ICD-10-CM

## 2025-02-28 RX ORDER — ATORVASTATIN CALCIUM 40 MG/1
40 TABLET, FILM COATED ORAL NIGHTLY
Qty: 90 TABLET | Refills: 3 | Status: SHIPPED | OUTPATIENT
Start: 2025-02-28

## 2025-02-28 NOTE — TELEPHONE ENCOUNTER
Received request for prescription refills for patient.   Patient follows with Dr Ellington    Request is for Atorvastatin  Is patient currently on medication Y    Last OV 10/11/24  Next OV 4/11/25    Pended for signing and sent to provider

## 2025-03-17 ENCOUNTER — HOSPITAL ENCOUNTER (OUTPATIENT)
Dept: CARDIOLOGY | Facility: HOSPITAL | Age: 70
Discharge: HOME | End: 2025-03-17
Payer: MEDICARE

## 2025-03-17 DIAGNOSIS — I44.7 LEFT BUNDLE BRANCH BLOCK (LBBB): ICD-10-CM

## 2025-03-17 DIAGNOSIS — I42.8 LEFT VENTRICULAR NON-COMPACTION CARDIOMYOPATHY (MULTI): ICD-10-CM

## 2025-03-17 DIAGNOSIS — Z95.810 ICD (IMPLANTABLE CARDIOVERTER-DEFIBRILLATOR) IN PLACE: ICD-10-CM

## 2025-03-17 PROCEDURE — 93295 DEV INTERROG REMOTE 1/2/MLT: CPT | Performed by: INTERNAL MEDICINE

## 2025-03-17 PROCEDURE — 93296 REM INTERROG EVL PM/IDS: CPT

## 2025-03-21 ENCOUNTER — OFFICE VISIT (OUTPATIENT)
Dept: ORTHOPEDIC SURGERY | Facility: CLINIC | Age: 70
End: 2025-03-21
Payer: MEDICARE

## 2025-03-21 ENCOUNTER — HOSPITAL ENCOUNTER (OUTPATIENT)
Dept: RADIOLOGY | Facility: CLINIC | Age: 70
Discharge: HOME | End: 2025-03-21
Payer: MEDICARE

## 2025-03-21 VITALS — BODY MASS INDEX: 30.46 KG/M2 | WEIGHT: 245 LBS | HEIGHT: 75 IN

## 2025-03-21 DIAGNOSIS — M76.61 RIGHT ACHILLES TENDINITIS: ICD-10-CM

## 2025-03-21 DIAGNOSIS — M79.671 PAIN OF RIGHT HEEL: ICD-10-CM

## 2025-03-21 DIAGNOSIS — M77.52 RETROCALCANEAL BURSITIS (BACK OF HEEL), LEFT: ICD-10-CM

## 2025-03-21 DIAGNOSIS — M77.51 RETROCALCANEAL BURSITIS (BACK OF HEEL), RIGHT: Primary | ICD-10-CM

## 2025-03-21 PROCEDURE — 73650 X-RAY EXAM OF HEEL: CPT | Mod: RT

## 2025-03-21 PROCEDURE — 99214 OFFICE O/P EST MOD 30 MIN: CPT | Performed by: FAMILY MEDICINE

## 2025-03-21 RX ORDER — METHYLPREDNISOLONE 4 MG/1
TABLET ORAL
Qty: 1 EACH | Refills: 0 | Status: SHIPPED | OUTPATIENT
Start: 2025-03-21

## 2025-03-21 ASSESSMENT — PATIENT HEALTH QUESTIONNAIRE - PHQ9
2. FEELING DOWN, DEPRESSED OR HOPELESS: NOT AT ALL
SUM OF ALL RESPONSES TO PHQ9 QUESTIONS 1 AND 2: 0
1. LITTLE INTEREST OR PLEASURE IN DOING THINGS: NOT AT ALL

## 2025-03-22 NOTE — PROGRESS NOTES
Acute Injury New Patient Visit    CC:   Chief Complaint   Patient presents with    Right Heel - Pain     Right heel pain , started 4-5 weeks ago. Xrays today       HPI: Alex is a 70 y.o.male who presents today with new complaints of right heel pain and discomfort for the last 4 to 5 weeks.  It is not nearly as bad as it was however he still wanted to get it checked out due to the persistence of pain.  He was recently doing a lot of work with insulin is unsure if this had irritated the area.  He denies any plantar fascia type pain everything is at the insertion of the Achilles to the back of the heel.        Review of Systems   GENERAL: Negative for malaise, significant weight loss, fever  MUSCULOSKELETAL: See HPI  NEURO: Negative for numbness / tingling     Past Medical History  Past Medical History:   Diagnosis Date    Acute maxillary sinusitis, unspecified 07/27/2020    Subacute maxillary sinusitis    Cellulitis of right finger 06/03/2019    Paronychia of finger of right hand    CHF (congestive heart failure)     Encounter for immunization     Encounter for immunization    Encounter for screening for lipoid disorders 12/03/2019    Screening, lipid    Encounter for screening for malignant neoplasm of colon 05/13/2021    Encounter for screening for colorectal malignant neoplasm    Encounter for screening for malignant neoplasm of prostate 12/03/2019    Screening PSA (prostate specific antigen)    Hypertension     Impingement syndrome of left shoulder     Impingement syndrome of left shoulder    Personal history of other diseases of the musculoskeletal system and connective tissue     History of arthritis    Personal history of other drug therapy 05/28/2020    History of pneumococcal vaccination    Personal history of other specified conditions 08/29/2019    History of fatigue       Medication review  Medication Documentation Review Audit       Reviewed by Pilar Resendiz MA (Medical Assistant) on 03/21/25 at 1117       Medication Order Taking? Sig Documenting Provider Last Dose Status   acetaminophen (Tylenol) 325 mg tablet [id number] No Take 500 mg by mouth every 6 hours if needed for mild pain (1 - 3). Historical Provider, MD Not Taking Active   apixaban (Eliquis) 5 mg tablet [id number]  Take 1 tablet (5 mg) by mouth 2 times a day. Dale Ellington MD  Active   atorvastatin (Lipitor) 40 mg tablet [id number]  Take 1 tablet (40 mg) by mouth once daily at bedtime. Dale Ellington MD  Active   Jardiance 10 mg [id number]  TAKE 1 TABLET BY MOUTH EVERY DAY Dale Ellington MD  Active   metoprolol succinate XL (Toprol-XL) 25 mg 24 hr tablet [id number]  TAKE 1 TABLET (25 MG) BY MOUTH DAILY DO NOT CRUSH OR CHEW Dale Ellington MD  Active   nitroglycerin (Nitrostat) 0.4 mg SL tablet [id number] No Place 1 tablet (0.4 mg) under the tongue every 5 minutes if needed for chest pain. Salma Nguyen, APRN-CNP Taking Active   sacubitriL-valsartan (Entresto) 49-51 mg tablet [id number]  Take 1 tablet by mouth 2 times a day. Dale Ellington MD  Active   spironolactone (Aldactone) 25 mg tablet [id number] No Take 0.5 tablets (12.5 mg) by mouth every other day. Jason Frank MD Taking Active   tamsulosin (Flomax) 0.4 mg 24 hr capsule [id number]  Take 1 capsule (0.4 mg) by mouth 2 times a day. Darwin Hagan MD  Active   ticagrelor (Brilinta) 90 mg tablet [id number]  Take 1 tablet (90 mg) by mouth 2 times a day. Jason Frank MD  Active                    Allergies  No Known Allergies    Social History  Social History     Socioeconomic History    Marital status:      Spouse name: Not on file    Number of children: Not on file    Years of education: Not on file    Highest education level: Not on file   Occupational History    Not on file   Tobacco Use    Smoking status: Never     Passive exposure: Never    Smokeless tobacco: Never   Substance and Sexual Activity    Alcohol use: Never    Drug use: Never    Sexual  activity: Defer   Other Topics Concern    Not on file   Social History Narrative    Not on file     Social Drivers of Health     Financial Resource Strain: Low Risk  (9/5/2024)    Overall Financial Resource Strain (CARDIA)     Difficulty of Paying Living Expenses: Not hard at all   Food Insecurity: Not on file   Transportation Needs: No Transportation Needs (9/7/2024)    PRAPARE - Transportation     Lack of Transportation (Medical): No     Lack of Transportation (Non-Medical): No   Physical Activity: Not on file   Stress: Not on file   Social Connections: Not on file   Intimate Partner Violence: Not on file   Housing Stability: Low Risk  (9/5/2024)    Housing Stability Vital Sign     Unable to Pay for Housing in the Last Year: No     Number of Times Moved in the Last Year: 0     Homeless in the Last Year: No       Surgical History  Past Surgical History:   Procedure Laterality Date    CARDIAC CATHETERIZATION N/A 9/6/2024    Procedure: Left Heart Cath;  Surgeon: Lexa Kent MD;  Location: [facility];  Service: Cardiovascular;  Laterality: N/A;    CARDIAC CATHETERIZATION N/A 9/6/2024    Procedure: PCI;  Surgeon: Lexa Kent MD;  Location: [facility];  Service: Cardiovascular;  Laterality: N/A;    CARDIAC DEFIBRILLATOR PLACEMENT      OTHER SURGICAL HISTORY  10/19/2021    Prostate biopsy    OTHER SURGICAL HISTORY  06/15/2021    Hernia repair    OTHER SURGICAL HISTORY  06/15/2021    Colonoscopy       Physical Exam:  GENERAL:  Patient is awake, alert, and oriented to person place and time.  Patient appears well nourished and well kept.  Affect Calm, Not Acutely Distressed.  HEENT:  Normocephalic, Atraumatic, EOMI  CARDIOVASCULAR:  Hemodynamically stable.  RESPIRATORY:  Normal respirations with unlabored breathing.  NEURO: Gross sensation intact to the lower extremities bilaterally.  Extremity: Left heel demonstrate skin which is warm pink well-perfused some soft tissue swelling and fullness  concerning for retrocalcaneal bursitis and tenderness along the distal aspect of the tendon.  The tendon is intact he has full plantarflexion dorsiflexion eversion inversion calf soft nontender proximally.  No presence for any abnormality distally about the midfoot or metatarsals.      Diagnostics: X-rays today as below  XR calcaneus right 2 views          Interpreted By:  Budinsky, Cole,   STUDY:  XR CALCANEUS RIGHT 2 VIEWS; ;  3/21/2025 11:31 am      INDICATION:  Signs/Symptoms:Pain.      ACCESSION NUMBER(S):  [id number]      ORDERING CLINICIAN:  COLE BUDINSKY      IMPRESSION:  Two views right calcaneus demonstrate no presence for acute fracture  or dislocation. Insertional calcaneal enthesophytes and degenerative  changes seen with some soft tissue swelling about the insertion of  the Achilles tendon. Calcific changes about the vascular structures  also seen. No significant insertional calcaneal plantar enthesophyte  present.          Signed by: Cole Budinsky 3/21/2025 5:16 PM  Dictation workstation:   [id number]             Procedure: None  Procedures    Assessment:   Problem List Items Addressed This Visit    None  Visit Diagnoses       Retrocalcaneal bursitis (back of heel), right    -  Primary    Relevant Medications    methylPREDNISolone (Medrol Dospak) 4 mg tablets    Other Relevant Orders    Referral to Physical Therapy    Pain of right heel        Relevant Medications    methylPREDNISolone (Medrol Dospak) 4 mg tablets    Other Relevant Orders    XR calcaneus right 2 views (Completed)    Referral to Physical Therapy    Retrocalcaneal bursitis (back of heel), left        Relevant Medications    methylPREDNISolone (Medrol Dospak) 4 mg tablets    Other Relevant Orders    Referral to Physical Therapy    Right Achilles tendinitis        Relevant Medications    methylPREDNISolone (Medrol Dospak) 4 mg tablets    Other Relevant Orders    Referral to Physical Therapy             Plan: Discussed with the patient  conservative broach management to likely.Me  Achilles tendinitis and retrocalcaneal bursitis.  We offered the patient a tall walking boot which she kindly deferred for now he would like to try heel lifts in shoes instead.  We offered him a steroid pack as NSAIDs are contraindicated due to his blood thinner status.  Will consider restarting injection of the retrocalcaneal bursa or Achilles tendon sheath follow-up.  He was given home exercises and a prescription for physical therapy discussed he could potentially consider massaging and Voltaren gel posteriorly to assist with swelling and inflammation as well as intermittent mildly icing.  Will see him back in 4 to 6 weeks, hopefully be able to get approval for an injection if necessary at that time.  If any worsening or persistent pain we will consider further advanced imaging.  Orders Placed This Encounter    XR calcaneus right 2 views    Referral to Physical Therapy    methylPREDNISolone (Medrol Dospak) 4 mg tablets      At the conclusion of the visit there were no further questions by the patient/family regarding their plan of care.  Patient was instructed to call or return with any issues, questions, or concerns regarding their injury and/or treatment plan described above.     03/22/25 at 1:38 PM - Cole C Budinsky, MD    Office: [phone]    This note was prepared using voice recognition software.  The details of this note are correct and have been reviewed, and corrected to the best of my ability.  Some grammatical errors may persist related to the Dragon software.

## 2025-03-26 ENCOUNTER — APPOINTMENT (OUTPATIENT)
Dept: ORTHOPEDIC SURGERY | Facility: CLINIC | Age: 70
End: 2025-03-26
Payer: MEDICARE

## 2025-03-26 ENCOUNTER — OFFICE VISIT (OUTPATIENT)
Dept: ORTHOPEDIC SURGERY | Facility: CLINIC | Age: 70
End: 2025-03-26
Payer: MEDICARE

## 2025-03-26 DIAGNOSIS — M11.262 PSEUDOGOUT OF LEFT KNEE: Primary | ICD-10-CM

## 2025-03-26 DIAGNOSIS — M17.12 LOCALIZED OSTEOARTHRITIS OF LEFT KNEE: ICD-10-CM

## 2025-03-26 PROCEDURE — 1123F ACP DISCUSS/DSCN MKR DOCD: CPT | Performed by: STUDENT IN AN ORGANIZED HEALTH CARE EDUCATION/TRAINING PROGRAM

## 2025-03-26 PROCEDURE — 99213 OFFICE O/P EST LOW 20 MIN: CPT | Performed by: STUDENT IN AN ORGANIZED HEALTH CARE EDUCATION/TRAINING PROGRAM

## 2025-03-26 PROCEDURE — 1159F MED LIST DOCD IN RCRD: CPT | Performed by: STUDENT IN AN ORGANIZED HEALTH CARE EDUCATION/TRAINING PROGRAM

## 2025-03-26 PROCEDURE — 1036F TOBACCO NON-USER: CPT | Performed by: STUDENT IN AN ORGANIZED HEALTH CARE EDUCATION/TRAINING PROGRAM

## 2025-03-26 NOTE — PROGRESS NOTES
"  Acute Injury Established Patient Visit    HPI: Alex is a 70 y.o.male who presents today for follow-up of his left knee osteoarthritis for pseudogout flare and is status post corticosteroid injection on 2/12/2025.  States that the injection worked very well.  He feels \"terrific\".  He has not had no pain the past 3 to 4 weeks.  Within 2 to 3 days, the pain began to dissipate and for a week or 2 afterwards would only have occasional, intermittent pain.  Denies any interval falls or injuries.  No other complaints or concerns at this time.    On 2/12/2025, he presented with new complaints of left knee pain.  Of note, he saw my partner, Dr. Bruce, on 1/15/2025, and was diagnosed with left knee osteoarthritis as well as chondrocalcinosis/pseudogout.  He was also seen in the emergency department around the same time and given prednisone.  He decided to try a course of oral prednisone, which within 2 days relieved his pain, but unfortunately over the past 2 days his pain has insidiously returned.  He denies any interval falls or injuries.  He denies any significant swelling, bruising, erythema, or warmth.    Plan: Today, on 3/26/2025, have him follow-up as needed, and would certainly consider subsequent corticosteroid injections of the knee as this seemed to work well, and can safely do this every 3 to 4 months as needed going forward.  He understands agrees with plan.  Will follow-up as needed.  He can return to activity as tolerated.    On 2/12/2025, for this left knee osteoarthritis versus pseudogout flare, provided him with a corticosteroid injection under ultrasound guidance which was tolerated well without complications.  Postinjection instructions given.  Will follow-up in 6 weeks for further evaluation.  Encouraged patient to see his primary care physician for further evaluation of underlying causes of his pseudogout.  Additionally, discussed conservative treatment measures including rest, ice, elevation, " compression, and over-the-counter analgesia as needed and as appropriate.  Risks of NSAID use, steroid use, and muscle relaxers discussed in depth and considered in light of medical comorbidities.  Patient, and parent/guardian as applicable, understand agree with plan.  Follow-up: As needed  X-rays on follow-up: None      Assessment:   Problem List Items Addressed This Visit    None  Visit Diagnoses       Pseudogout of left knee    -  Primary    Localized osteoarthritis of left knee                  Diagnostics: Reviewed all relevant imaging including x-ray, MRI, CT, and US.      Procedure:  Procedures    Physical Exam:  GENERAL:  No obvious acute distress.  NEURO:  Distally neurovascularly intact.  Sensation intact to light touch.  Extremity: Left knee examination shows:  Skin is intact;  No erythema or warmth;  No edema or ecchymosis;  No joint effusion;  Can flex the left knee to 130 degrees;  Full extension at 0 degrees;  No pain over the patella;  Negative patellar grind test;  No longer TENDER over the medial joint line more posteriorly;  No pain over the lateral joint line;  No pain over the patellar or quadricep tendon;  No pain over the proximal tibia;  No pain over the popliteal fossa;  Negative valgus stress test;  Negative varus stress test;  Negative Obdulia's test medially with no instability;  Negative Obdulia's test laterally with no instability;  Negative Lachman's test;  Patellar and quadricep mechanism intact;  Negative anterior and posterior drawer test;  Negative patellar apprehension test;  Distal pulses are palpable;  Neurovascularly intact; and  Walking with no significant antalgic gait.    Orders Placed This Encounter    L Inj/Asp: L knee      At the conclusion of the visit there were no further questions by the patient/family regarding their plan of care.  Patient was instructed to call or return with any issues, questions, or concerns regarding their injury and/or treatment plan  described above.     03/26/25 at 3:06 PM - Ghanshyam Sharp,     Office: [phone]    This note was prepared using voice recognition software.  The details of this note are correct and have been reviewed, and corrected to the best of my ability.  Some grammatical errors may persist related to the Dragon software.

## 2025-04-01 ENCOUNTER — OFFICE VISIT (OUTPATIENT)
Dept: ORTHOPEDIC SURGERY | Facility: CLINIC | Age: 70
End: 2025-04-01
Payer: MEDICARE

## 2025-04-01 DIAGNOSIS — M76.61 RIGHT ACHILLES TENDINITIS: ICD-10-CM

## 2025-04-01 DIAGNOSIS — M77.51 RETROCALCANEAL BURSITIS (BACK OF HEEL), RIGHT: Primary | ICD-10-CM

## 2025-04-01 PROCEDURE — 99214 OFFICE O/P EST MOD 30 MIN: CPT | Performed by: STUDENT IN AN ORGANIZED HEALTH CARE EDUCATION/TRAINING PROGRAM

## 2025-04-01 PROCEDURE — L4361 PNEUMA/VAC WALK BOOT PRE OTS: HCPCS | Performed by: STUDENT IN AN ORGANIZED HEALTH CARE EDUCATION/TRAINING PROGRAM

## 2025-04-01 PROCEDURE — 1123F ACP DISCUSS/DSCN MKR DOCD: CPT | Performed by: STUDENT IN AN ORGANIZED HEALTH CARE EDUCATION/TRAINING PROGRAM

## 2025-04-01 RX ORDER — COLCHICINE 0.6 MG/1
0.6 TABLET ORAL DAILY
Qty: 6 TABLET | Refills: 0 | Status: SHIPPED | OUTPATIENT
Start: 2025-04-01 | End: 2025-04-07

## 2025-04-01 NOTE — PROGRESS NOTES
"  Acute Injury Established Patient Visit    HPI: Alex is a 70 y.o.male who presents today for concerns of right heel pain.  He had been seen by my partner, Dr. Cole Budinsky, for similar complaint last week, and he was offered a boot, but declined at that time, and has held off on his Medrol Dosepak as he is concerned that it will elevate his blood pressure.  He states that he had been feeling better.  He did some walking on the treadmill over the weekend and had a return of some pain.  Most the time it is not bothering him.  He denies any interval falls or injuries.  He was inquiring about a possible injection into the Achilles tendon sheath.    On 3/26/2025, he presented for follow-up of his left knee osteoarthritis for pseudogout flare and is status post corticosteroid injection on 2/12/2025.  States that the injection worked very well.  He feels \"terrific\".  He has not had no pain the past 3 to 4 weeks.  Within 2 to 3 days, the pain began to dissipate and for a week or 2 afterwards would only have occasional, intermittent pain.  Denies any interval falls or injuries.  No other complaints or concerns at this time.    On 2/12/2025, he presented with new complaints of left knee pain.  Of note, he saw my partner, Dr. Bruce, on 1/15/2025, and was diagnosed with left knee osteoarthritis as well as chondrocalcinosis/pseudogout.  He was also seen in the emergency department around the same time and given prednisone.  He decided to try a course of oral prednisone, which within 2 days relieved his pain, but unfortunately over the past 2 days his pain has insidiously returned.  He denies any interval falls or injuries.  He denies any significant swelling, bruising, erythema, or warmth.    Plan: Today, on 4/1/2025, for this right Achilles tendinitis and right retrocalcaneal bursitis, given its mild severity at this time and ability to show improvement with rest, will start him on colchicine, physical therapy, tall boot " with a heel lift.  He can come out of the boot after 7 to 10 days as he felt comfortable to return to his shoes with the heel lifts in the shoes.  We will hold off on any injections at this time.      On 3/26/2025, have him follow-up as needed, and would certainly consider subsequent corticosteroid injections of the knee as this seemed to work well, and can safely do this every 3 to 4 months as needed going forward.  He understands agrees with plan.  Will follow-up as needed.  He can return to activity as tolerated.    On 2/12/2025, for this left knee osteoarthritis versus pseudogout flare, provided him with a corticosteroid injection under ultrasound guidance which was tolerated well without complications.  Postinjection instructions given.  Will follow-up in 6 weeks for further evaluation.  Encouraged patient to see his primary care physician for further evaluation of underlying causes of his pseudogout.  Additionally, discussed conservative treatment measures including rest, ice, elevation, compression, and over-the-counter analgesia as needed and as appropriate.  Risks of NSAID use, steroid use, and muscle relaxers discussed in depth and considered in light of medical comorbidities.  Patient, and parent/guardian as applicable, understand agree with plan.  Follow-up: 4 to 5 weeks  X-rays on follow-up: None      Assessment:   Problem List Items Addressed This Visit    None  Visit Diagnoses       Retrocalcaneal bursitis (back of heel), right    -  Primary    Relevant Medications    colchicine 0.6 mg tablet    Other Relevant Orders    Referral to Physical Therapy    Walking boot    Right Achilles tendinitis        Relevant Medications    colchicine 0.6 mg tablet    Other Relevant Orders    Referral to Physical Therapy    Walking boot                Diagnostics: Reviewed all relevant imaging including x-ray, MRI, CT, and US.      Procedure:  Procedures    Physical Exam:  GENERAL:  No obvious acute distress.  NEURO:   Distally neurovascularly intact.  Sensation intact to light touch.  Extremity: Right ankle exam shows:  Skin is intact;  No erythema or warmth;  No clinical signs of infection;  No pain over the lateral malleolus;  No pain over the medial malleolus;  No pain over the ATF, CF or PTF ligaments;  No pain over the deltoid ligament;  TENDER over the Achilles tendon with mild swelling over the retrocalcaneal bursa with no overlying signs of infection including no erythema, warmth, or drainage;  Negative Leon's test;  Negative squeeze test;  Negative anterior drawer test;  Negative talar tilt test;  No pain over the anterior process of the talus;  No pain over the talar dome;  No pain over the base of the fifth metatarsal bone;  No pain over the calcaneus;  No pain over the plantar aponeurosis;  No pain of the midfoot; and  Neurovascularly intact.    Orders Placed This Encounter    Walking boot    Referral to Physical Therapy    colchicine 0.6 mg tablet      At the conclusion of the visit there were no further questions by the patient/family regarding their plan of care.  Patient was instructed to call or return with any issues, questions, or concerns regarding their injury and/or treatment plan described above.     04/01/25 at 4:32 PM - Ghanshyam Sharp DO    Office: [phone]    This note was prepared using voice recognition software.  The details of this note are correct and have been reviewed, and corrected to the best of my ability.  Some grammatical errors may persist related to the Dragon software.

## 2025-04-08 NOTE — PROGRESS NOTES
Physical Therapy     Physical Therapy Evaluation                                        Patient Name: Alex Alba  MRN: [id number]  :  1955  Today's Date: 2025  Time Calculation  Start Time: 1516  Stop Time: 1545  Time Calculation (min): 29 min  PT Evaluation Time Entry  PT Evaluation (Low) Time Entry: 15  PT Therapeutic Procedures Time Entry  Therapeutic Exercise Time Entry: 14       Reason for Visit  Referred by: Ghanshyam Sharp  Referral DX date: 25     Diagnosis:  1. Retrocalcaneal bursitis (back of heel), right    2. Right Achilles tendinitis        Insurance:  Visit: #1  POC: 4  Authorized: *Pending*  Certification: 2025 to 25  Payor: [employer] / Plan: [employer] / Product Type: *No Product type* /     Subjective:  3 months ago, pt had cortisone shot in L foot but altered gait on R where heel is causing pain. Boot helped pain but caused pain at knee.   Current Episode  Date of Onset:  2025  Mechanism of injury:  overuse    Chief Complaint:  currently no pain, but did not want to cancel appointment in case   Relevant incidents/injuries:  none  Progression of symptoms:  getting better  Previous treatments:  at home exercises  Relevant medical history:  unremarkable     Prior level of function: IND   Functional limitations: when pain is high, ambulation  Home environment: 2 jessi with R hand rail going upward  Work status/occupation: retired but working from home where office is on second floor  Recreational activity: golf, walking     Patient stated goals for treatment: keep pain away     Reviewed medical screening history form with patient.     Precautions: Pacemaker and ICD  Precautions  STEADI Fall Risk Score (The score of 4 or more indicates an increased risk of falling): 0        Pain:  Location: R posterior   Current pain: 0/10; Range: 0-5/10  Aggravating factor: walking with old shoes  Alleviating factor: new shoes, CAM boot,       Objective:  Observation/Posture: L hip fwd in stance and with amb     AROM:   Lower Extremity Right Left   Hip Flex WNL   Hip Ext    Knee Flex    Knee Ext    DF    PF    Hip Abd    Hip Add            PROM/mobility:   Lower Extremity Right Left   Hip Flex WNL   Hip Ext    Knee Flex    Knee Ext    DF    PF    Hip Abd    Hip Add         MMT:   Lower Extremity Right Left   Hip Flex 4+/5 4+/5   Hip Ext 4/5 4/5   Knee Flex 4+/5 4+/5   Knee Ext 5/5 5/5   DF 5/5 5/5   PF 4+/5 4+/5   Hip Abd 5/5 5/5   Hip Add 5/5 5/5          Balance:   unremarkable     Gait:   Fwd L hip throughout  Mid stance on R with lateral      Functional testing:   Squat with hip strategy but limited ankle strategy    Sensation:   Patient denies numbness/tingling of bilateral LOWER extremities.  Light Touch: normal    Coordination:  Heel to shin: normal       Outcome Measure:   Other Measures  Lower Extremity Funtional Score (LEFS): 72       Treatment:  Provided education regarding POC, goals created, reasoning for assessments performed and results of those assessments. Additionally, provided education regarding scheduling proposal with pt asked about scheduling preferences, call in/no show policy, and pt provides verbal confirmation of understanding.  Long Sitting Ankle Dorsiflexion with Anchored Resistance    Long Sitting Ankle Plantar Flexion with Resistance   Long Sitting Calf Stretch with Strap   Long Sitting Ankle Eversion with Resistance  Long Sitting Ankle Inversion with Resistance     OP Education: HEP:   Access Code: [email or web address]  URL: [email or web address]  Date: 04/09/2025  Prepared by: Dell Kolb    Exercises  - Long Sitting Ankle Inversion with Resistance  - 1 x daily - 4 x weekly - 3 sets - 10 reps  - Long Sitting Ankle Eversion with Resistance  - 1 x daily - 4 x weekly - 3 sets - 10 reps  - Long Sitting Calf Stretch with Strap  - 1 x daily - 4 x weekly - 3 sets - 10 reps  - Long Sitting Ankle Plantar Flexion with  Resistance  - 1 x daily - 4 x weekly - 3 sets - 10 reps  - Long Sitting Ankle Dorsiflexion with Anchored Resistance  - 1 x daily - 4 x weekly - 3 sets - 10 reps    Assessment:  Patient is a 70 y.o. MALE presents to [facility] for assessment and treatment of mobility related impairments s/p ankle bursitis on R LE.   Patient is alert and oriented x 3.  Patient presents with medical diagnosis of retrocalcaneal bursitis contributing to compensatory soft tissue dysfunction, pain, stiffness and weakness of the R LE. Significant past medical history/past surgical history includes L ankle pain. Skilled care is needed to progress the patient back to these activities without exacerbating symptoms. Patient requires skilled PT services to address the problems identified and the individualized patient's goals as outlined in the problems and goals section of this evaluation. A skilled PT is required to address these key impairments and to provide and progress with an appropriate home exercise program. Patient does not have significant PMH influencing Rx and reports motivation to return to FUNCTIONAL ACTIVITY and RETURN TO SPORT. Patient demonstrates to be a good candidate for physical therapy with good rehab potential and verbalized a good understanding of their diagnosis, prognosis and treatment. Pt is motivated and has strong family support which reinforces their potential for improvement.  Pt would benefit from skilled physical therapy to improve strength, balance and decrease pain.  Goals have been established and reviewed with the patient.      PT Assessment Results: Pain  Rehab Prognosis: Excellent  Evaluation/Treatment Tolerance: Patient tolerated treatment well    Plan:  Frequency/duration: 1x every 2 weeks for 8 weeks  Treatment/Interventions: Biofeedback, Education/ Instruction, Electrical stimulation, Gait training, Manual therapy, Neuromuscular re-education, Taping techniques, Self care/ home management,  Therapeutic activities, Therapeutic exercises, Ultrasound, Vasopneumatic device  PT Plan: Skilled PT  PT Frequency:  (1x every 2 weeks)  Duration: 8 weeks  Onset Date: 04/01/25  Certification Period Start Date: 04/09/25  Certification Period End Date: 07/08/25     Rehab Potential: Excellent  Plan of Care Agreement: Patient    Goals:  Understand and demonstrate a home exercise program that will support and continue the process of neural plasticity resulting in continued improvement of  lower extremity function, increased mobility, and safety.  Pt will report 0/10 pain for 1 week while walking his usual amount to return to PLOF.  Patient will demonstrate an increased score in LEFS score to </=  80/80 to meet established MCID for the outcome measure (baseline 4/9/2025 : 72/80).      Complexity:  Low complexity evaluation  due to a past medical history WITHOUT personal factors and/or comorbidities that could impact the POC, examination of body systems completed on one to two elements, the patient presents with a stable condition, and clinical decision making.    Dell Kolb, PT  4/9/2025.

## 2025-04-09 ENCOUNTER — EVALUATION (OUTPATIENT)
Dept: PHYSICAL THERAPY | Facility: CLINIC | Age: 70
End: 2025-04-09
Payer: MEDICARE

## 2025-04-09 DIAGNOSIS — M77.51 RETROCALCANEAL BURSITIS (BACK OF HEEL), RIGHT: ICD-10-CM

## 2025-04-09 DIAGNOSIS — M76.61 RIGHT ACHILLES TENDINITIS: ICD-10-CM

## 2025-04-09 PROCEDURE — 97161 PT EVAL LOW COMPLEX 20 MIN: CPT | Mod: GP

## 2025-04-09 PROCEDURE — 97110 THERAPEUTIC EXERCISES: CPT | Mod: GP

## 2025-04-09 ASSESSMENT — PAIN SCALES - GENERAL: PAINLEVEL_OUTOF10: 0 - NO PAIN

## 2025-04-09 ASSESSMENT — PAIN - FUNCTIONAL ASSESSMENT: PAIN_FUNCTIONAL_ASSESSMENT: 0-10

## 2025-04-14 ENCOUNTER — HOSPITAL ENCOUNTER (OUTPATIENT)
Dept: CARDIOLOGY | Facility: CLINIC | Age: 70
Discharge: HOME | End: 2025-04-14
Payer: MEDICARE

## 2025-04-14 DIAGNOSIS — I25.10 CORONARY ARTERY DISEASE INVOLVING NATIVE CORONARY ARTERY OF NATIVE HEART WITHOUT ANGINA PECTORIS: ICD-10-CM

## 2025-04-14 DIAGNOSIS — I25.5 ISCHEMIC CARDIOMYOPATHY: ICD-10-CM

## 2025-04-14 LAB
AORTIC VALVE MEAN GRADIENT: 3 MMHG
AORTIC VALVE PEAK VELOCITY: 1.32 M/S
AV PEAK GRADIENT: 7 MMHG
AVA (PEAK VEL): 3.05 CM2
AVA (VTI): 3.49 CM2
EJECTION FRACTION APICAL 4 CHAMBER: 44.8
EJECTION FRACTION: 45 %
LEFT VENTRICLE INTERNAL DIMENSION DIASTOLE: 3.8 CM (ref 3.5–6)
LEFT VENTRICULAR OUTFLOW TRACT DIAMETER: 2.2 CM
LV EJECTION FRACTION BIPLANE: 49 %
MITRAL VALVE E/A RATIO: 0.91
MITRAL VALVE E/E' RATIO: 7.9
RIGHT VENTRICLE PEAK SYSTOLIC PRESSURE: 25.7 MMHG

## 2025-04-14 PROCEDURE — 93306 TTE W/DOPPLER COMPLETE: CPT | Performed by: INTERNAL MEDICINE

## 2025-04-14 PROCEDURE — C8929 TTE W OR WO FOL WCON,DOPPLER: HCPCS

## 2025-04-14 PROCEDURE — 2500000004 HC RX 250 GENERAL PHARMACY W/ HCPCS (ALT 636 FOR OP/ED): Performed by: INTERNAL MEDICINE

## 2025-04-14 RX ADMIN — HUMAN ALBUMIN MICROSPHERES AND PERFLUTREN 1 ML: 10; .22 INJECTION, SOLUTION INTRAVENOUS at 11:09

## 2025-04-18 ENCOUNTER — OFFICE VISIT (OUTPATIENT)
Dept: ORTHOPEDIC SURGERY | Facility: CLINIC | Age: 70
End: 2025-04-18
Payer: MEDICARE

## 2025-04-18 ENCOUNTER — HOSPITAL ENCOUNTER (OUTPATIENT)
Dept: RADIOLOGY | Facility: CLINIC | Age: 70
Discharge: HOME | End: 2025-04-18
Payer: MEDICARE

## 2025-04-18 VITALS — WEIGHT: 240 LBS | HEIGHT: 75 IN | BODY MASS INDEX: 29.84 KG/M2

## 2025-04-18 DIAGNOSIS — M25.562 ACUTE PAIN OF LEFT KNEE: ICD-10-CM

## 2025-04-18 DIAGNOSIS — M17.12 ARTHRITIS OF LEFT KNEE: Primary | ICD-10-CM

## 2025-04-18 PROCEDURE — 73564 X-RAY EXAM KNEE 4 OR MORE: CPT | Mod: LT

## 2025-04-18 PROCEDURE — 99213 OFFICE O/P EST LOW 20 MIN: CPT | Performed by: INTERNAL MEDICINE

## 2025-04-18 RX ORDER — PREDNISONE 50 MG/1
50 TABLET ORAL DAILY
Qty: 5 TABLET | Refills: 0 | Status: SHIPPED | OUTPATIENT
Start: 2025-04-18 | End: 2025-04-23

## 2025-04-18 NOTE — PROGRESS NOTES
Acute Injury New Patient Visit    CC: No chief complaint on file.      HPI: Alex is a 70 y.o. male presents today for evaluation for acute on chronic left knee pain which flared up a week ago. He had a cortisone injection to the same knee two months ago by Dr. Barrera. He is here for initial evaluation and x-rays.         Review of Systems   GENERAL: Negative for malaise, significant weight loss, fever  MUSCULOSKELETAL: See HPI  NEURO:  Negative for numbness / tingling     Past Medical History  Medical History[1]    Medication review  Medication Documentation Review Audit       Reviewed by Jah Garcia (Technologist) on 03/26/25 at 1413      Medication Order Taking? Sig Documenting Provider Last Dose Status   acetaminophen (Tylenol) 325 mg tablet [id number] No Take 500 mg by mouth every 6 hours if needed for mild pain (1 - 3). Historical Provider, MD Not Taking Active   apixaban (Eliquis) 5 mg tablet [id number]  Take 1 tablet (5 mg) by mouth 2 times a day. Dale Ellington MD  Active   atorvastatin (Lipitor) 40 mg tablet [id number]  Take 1 tablet (40 mg) by mouth once daily at bedtime. Dale Ellington MD  Active   Jardiance 10 mg [id number]  TAKE 1 TABLET BY MOUTH EVERY DAY Dale Ellington MD  Active   methylPREDNISolone (Medrol Dospak) 4 mg tablets [id number]  Follow schedule on package instructions Cole C Budinsky, MD  Active   metoprolol succinate XL (Toprol-XL) 25 mg 24 hr tablet [id number]  TAKE 1 TABLET (25 MG) BY MOUTH DAILY DO NOT CRUSH OR CHEW Dale Ellington MD  Active   nitroglycerin (Nitrostat) 0.4 mg SL tablet [id number] No Place 1 tablet (0.4 mg) under the tongue every 5 minutes if needed for chest pain. Salma Nguyen, APRN-CNP Taking Active   sacubitriL-valsartan (Entresto) 49-51 mg tablet [id number]  Take 1 tablet by mouth 2 times a day. Dale Ellington MD  Active   spironolactone (Aldactone) 25 mg tablet [id number] No Take 0.5 tablets (12.5 mg) by mouth every other day. Jason Frank MD  Taking Active   tamsulosin (Flomax) 0.4 mg 24 hr capsule [id number]  Take 1 capsule (0.4 mg) by mouth 2 times a day. Darwin Hagan MD  Active   ticagrelor (Brilinta) 90 mg tablet [id number]  Take 1 tablet (90 mg) by mouth 2 times a day. Jason Frank MD  Active                    Allergies  RX Allergies[2]    Social History  Social History     Socioeconomic History    Marital status:      Spouse name: Not on file    Number of children: Not on file    Years of education: Not on file    Highest education level: Not on file   Occupational History    Not on file   Tobacco Use    Smoking status: Never     Passive exposure: Never    Smokeless tobacco: Never   Substance and Sexual Activity    Alcohol use: Never    Drug use: Never    Sexual activity: Defer   Other Topics Concern    Not on file   Social History Narrative    Not on file     Social Drivers of Health     Financial Resource Strain: Low Risk  (9/5/2024)    Overall Financial Resource Strain (CARDIA)     Difficulty of Paying Living Expenses: Not hard at all   Food Insecurity: Not on file   Transportation Needs: No Transportation Needs (9/7/2024)    PRAPARE - Transportation     Lack of Transportation (Medical): No     Lack of Transportation (Non-Medical): No   Physical Activity: Not on file   Stress: Not on file   Social Connections: Not on file   Intimate Partner Violence: Not on file   Housing Stability: Low Risk  (9/5/2024)    Housing Stability Vital Sign     Unable to Pay for Housing in the Last Year: No     Number of Times Moved in the Last Year: 0     Homeless in the Last Year: No       Surgical History  Surgical History[3]    Physical Exam:  GENERAL:  Patient is awake, alert, and oriented to person place and time.  Patient appears well nourished and well kept.  Affect Calm, Not Acutely Distressed.  HEENT:  Normocephalic, Atraumatic, EOMI  CARDIOVASCULAR:  Hemodynamically stable.  RESPIRATORY:  Normal respirations with unlabored  breathing.  Extremity: Left knee examination shows skin is intact.  There is no erythema or warmth.  1+ effusion.  Can flex the left knee to 130 degrees.  Full extension at 0 degrees.  Pain pain over the medial joint line.  Pain over the lateral joint line.  There is no pain over the patellar or quadricep tendon.  No pain over the proximal tibia.  No pain over the popliteal fossa.  Negative valgus stress test.  Negative varus stress test.  Negative Obdulia's test medially with no instability.  Negative Obdulia's test laterally with no instability.  Negative Lachman's test.  Patellar and quadricep mechanism intact.  Negative anterior and posterior drawer test.  Negative patellar apprehension test.  Distal pulses are palpable, neurovascularly intact.  Walking with no significant antalgic gait.      Diagnostics: X-rays reviewed      Procedure: None    Assessment: Left knee osteoarthritis with chondrocalcinosis    Plan: Alex presents today for evaluation for acute on chronic left knee pain which flared up a week ago. He had a cortisone injection two months ago.  We discussed we could not repeat the corticosteroid injection and is only been 2 months.  We did offer a short course of prednisone 50 mg for 5 days.  Will also submit for 3 series viscosupplement injections to the left knee for left knee osteoarthritis and chondrocalcinosis.  He will follow-up with Dr. Barrera as scheduled.    In conclusion, this patient has OA of the knee which is symptomatic causing significant morning stiffness lasting up to an hour with popping, clicking, grinding with ROM, which is worse with prolonged standing or going up/down stairs.  This affects functional activities and ADLs.  There is radiographic evidence of OA with joint space narrowing and marginal osteophyte formation.  This patient has also failed pharmacologic treatment for OA including OTC analgesics, antiinflammatory medications, as well as intraarticular corticosteroid  injections.  For these reasons, I feel the patient is a candidate for viscosupplementation injections of the left knee.     No orders of the defined types were placed in this encounter.     At the conclusion of the visit there were no further questions by the patient/family regarding their plan of care.  Patient was instructed to call or return with any issues, questions, or concerns regarding their injury and/or treatment plan described above.     04/18/25 at 1:03 PM - Kim Bruce MD  Scribe Attestation  By signing my name below, I, Andrea Glaser, Scribe   attest that this documentation has been prepared under the direction and in the presence of Kim Bruce MD.    Office: [phone]    This note was prepared using voice recognition software.  The details of this note are correct and have been reviewed, and corrected to the best of my ability.  Some grammatical errors may persist related to the Dragon software.         [1]   Past Medical History:  Diagnosis Date    Acute maxillary sinusitis, unspecified 07/27/2020    Subacute maxillary sinusitis    Cellulitis of right finger 06/03/2019    Paronychia of finger of right hand    CHF (congestive heart failure)     Encounter for immunization     Encounter for immunization    Encounter for screening for lipoid disorders 12/03/2019    Screening, lipid    Encounter for screening for malignant neoplasm of colon 05/13/2021    Encounter for screening for colorectal malignant neoplasm    Encounter for screening for malignant neoplasm of prostate 12/03/2019    Screening PSA (prostate specific antigen)    Hypertension     Impingement syndrome of left shoulder     Impingement syndrome of left shoulder    Personal history of other diseases of the musculoskeletal system and connective tissue     History of arthritis    Personal history of other drug therapy 05/28/2020    History of pneumococcal vaccination    Personal history of other specified conditions 08/29/2019     History of fatigue   [2] No Known Allergies  [3]   Past Surgical History:  Procedure Laterality Date    CARDIAC CATHETERIZATION N/A 9/6/2024    Procedure: Left Heart Cath;  Surgeon: Lexa Kent MD;  Location: [facility];  Service: Cardiovascular;  Laterality: N/A;    CARDIAC CATHETERIZATION N/A 9/6/2024    Procedure: PCI;  Surgeon: Lexa Kent MD;  Location: [facility];  Service: Cardiovascular;  Laterality: N/A;    CARDIAC DEFIBRILLATOR PLACEMENT      OTHER SURGICAL HISTORY  10/19/2021    Prostate biopsy    OTHER SURGICAL HISTORY  06/15/2021    Hernia repair    OTHER SURGICAL HISTORY  06/15/2021    Colonoscopy

## 2025-04-21 ENCOUNTER — APPOINTMENT (OUTPATIENT)
Dept: CARDIOLOGY | Facility: CLINIC | Age: 70
End: 2025-04-21
Payer: MEDICARE

## 2025-04-21 ENCOUNTER — OFFICE VISIT (OUTPATIENT)
Dept: CARDIOLOGY | Facility: CLINIC | Age: 70
End: 2025-04-21
Payer: MEDICARE

## 2025-04-21 VITALS
SYSTOLIC BLOOD PRESSURE: 128 MMHG | DIASTOLIC BLOOD PRESSURE: 74 MMHG | BODY MASS INDEX: 30.9 KG/M2 | HEART RATE: 72 BPM | WEIGHT: 248.56 LBS | HEIGHT: 75 IN

## 2025-04-21 DIAGNOSIS — I48.19 PERSISTENT ATRIAL FIBRILLATION (MULTI): ICD-10-CM

## 2025-04-21 DIAGNOSIS — Z78.9 NEVER SMOKED TOBACCO: ICD-10-CM

## 2025-04-21 DIAGNOSIS — I21.4 NSTEMI (NON-ST ELEVATED MYOCARDIAL INFARCTION) (MULTI): ICD-10-CM

## 2025-04-21 DIAGNOSIS — I25.10 CORONARY ARTERY DISEASE INVOLVING NATIVE CORONARY ARTERY OF NATIVE HEART WITHOUT ANGINA PECTORIS: ICD-10-CM

## 2025-04-21 DIAGNOSIS — I44.7 LEFT BUNDLE BRANCH BLOCK (LBBB): ICD-10-CM

## 2025-04-21 DIAGNOSIS — I25.5 ISCHEMIC CARDIOMYOPATHY: ICD-10-CM

## 2025-04-21 PROBLEM — I48.91 A-FIB (MULTI): Status: ACTIVE | Noted: 2025-04-21

## 2025-04-21 PROCEDURE — 99214 OFFICE O/P EST MOD 30 MIN: CPT | Performed by: INTERNAL MEDICINE

## 2025-04-21 PROCEDURE — 1123F ACP DISCUSS/DSCN MKR DOCD: CPT | Performed by: INTERNAL MEDICINE

## 2025-04-21 PROCEDURE — 3008F BODY MASS INDEX DOCD: CPT | Performed by: INTERNAL MEDICINE

## 2025-04-21 PROCEDURE — 1159F MED LIST DOCD IN RCRD: CPT | Performed by: INTERNAL MEDICINE

## 2025-04-21 RX ORDER — CLOPIDOGREL BISULFATE 75 MG/1
75 TABLET ORAL DAILY
Qty: 90 TABLET | Refills: 3 | Status: SHIPPED | OUTPATIENT
Start: 2025-04-21 | End: 2026-04-21

## 2025-04-21 ASSESSMENT — ENCOUNTER SYMPTOMS
RESPIRATORY NEGATIVE: 1
CARDIOVASCULAR NEGATIVE: 1
NEUROLOGICAL NEGATIVE: 1
CONSTITUTIONAL NEGATIVE: 1

## 2025-04-21 NOTE — PATIENT INSTRUCTIONS
Stop Brilinta and start Plavix 75mg a day  6 month visit  Patient educated on proper medication use.   Patient educated on risk factor modification.   Please bring any lab results from other providers / physicians to your next appointment.     Please bring all medicines, vitamins, and herbal supplements with you when you come to the office.     Prescriptions will not be filled unless you are compliant with your follow up appointments or have a follow up appointment scheduled as per instruction of your physician. Refills should be requested at the time of your visit.  ]

## 2025-04-21 NOTE — PROGRESS NOTES
CARDIOLOGY OFFICE VISIT      CHIEF COMPLAINT  Chief Complaint   Patient presents with    Follow-up     6 month follow up for HTN, CAD, and HLD management.        HISTORY OF PRESENT ILLNESS  Alex Alba is a 70 y.o. year old male patient  with a history of nonischemic cardiomyopathy and normal cardiac catheterization in February 2023 with initial EF of 10 to 15% but follow-up cardiac MRI showed normal LV cavity with EF of 22%.  S/p BiV AICD implantation and follow-up echo shows improved EF to 45 to 50% in April 2024.  He was admitted in September 24 with non-STEMI and had cardiac catheterization which showed 95% mid RCA stenosis treated with drug-eluting stent.  His EF was noted to be about 35% on the cardiac catheterization.  He has been doing well since then with no recurrent chest pain.  He denies orthopnea proximal nocturnal dyspnea or shortness of breath.    He had a follow-up echocardiogram in April 2025 that showed improved EF of 45% with mild mitral and tricuspid rotation.    ASSESSMENT AND PLAN  1.  Non-STEMI: With a recent RCA stent as noted above, with no recurrent chest pain.  Will continue with current dual antiplatelet therapy.  2.  Nonischemic cardiomyopathy: With follow-up echocardiogram shows improved EF of about 45% as noted above.  He is doing well with optimal GDMT including Entresto, metoprolol, spironolactone as well as Jardiance and he is not in acute heart failure appears compensated  3.  Hypertension, blood pressure is well-controlled on his current medications including Entresto, metoprolol and spironolactone which we will continue.  4.  Dyslipidemia: Continue with lipid-lowering therapy    ASSESSMENT AND PLAN  Problem List Items Addressed This Visit       Left bundle branch block (LBBB)    Cardiomyopathy    Never smoked tobacco    BMI 31.0-31.9,adult    NSTEMI (non-ST elevated myocardial infarction) (Multi)    CAD (coronary artery disease)    A-fib (Multi)           Past Medical  History  Medical History[1]    Social History  Social History[2]    Family History   Family History[3]     Allergies:  RX Allergies[4]     Outpatient Medications:  Current Outpatient Medications   Medication Instructions    acetaminophen (TYLENOL) 500 mg, Every 6 hours PRN    apixaban (ELIQUIS) 5 mg, oral, 2 times daily    atorvastatin (LIPITOR) 40 mg, oral, Nightly    Jardiance 10 mg, oral, Daily    methylPREDNISolone (Medrol Dospak) 4 mg tablets Follow schedule on package instructions    metoprolol succinate XL (TOPROL-XL) 25 mg, oral, Daily, Swallow whole. Do not chew or crush    nitroglycerin (NITROSTAT) 0.4 mg, sublingual, Every 5 min PRN    predniSONE (DELTASONE) 50 mg, oral, Daily    sacubitriL-valsartan (Entresto) 49-51 mg tablet 1 tablet, oral, 2 times daily    spironolactone (ALDACTONE) 12.5 mg, oral, Every other day    tamsulosin (FLOMAX) 0.4 mg, oral, 2 times daily    ticagrelor (BRILINTA) 90 mg, oral, 2 times daily        Recent Lab Results:  I have personally reviewed the below noted lab results:    CBC:   Lab Results   Component Value Date    WBC 6.8 09/07/2024    RBC 4.57 09/07/2024    HGB 14.2 09/07/2024    HCT 43.6 09/07/2024     (L) 09/07/2024        CMP:    Lab Results   Component Value Date     09/07/2024    K 4.0 09/07/2024     (H) 09/07/2024    CO2 23 09/07/2024    BUN 24 (H) 09/07/2024    CREATININE 1.15 09/07/2024    GLUCOSE 95 09/07/2024    CALCIUM 8.3 (L) 09/07/2024       Magnesium:    Lab Results   Component Value Date    MG 1.96 09/06/2024       Lipid Profile:    Lab Results   Component Value Date    TRIG 97 09/06/2024    HDL 36.9 09/06/2024    LDLCALC 100 (H) 09/06/2024       TSH:    Lab Results   Component Value Date    TSH 2.95 06/29/2023       BNP:   Lab Results   Component Value Date     (H) 09/05/2024        PT/INR:    Lab Results   Component Value Date    PROTIME 14.2 (H) 09/05/2024    INR 1.3 (H) 09/05/2024       HgBA1c:    Lab Results   Component Value  Date    HGBA1C 5.8 (H) 05/20/2024       BMP:  Lab Results   Component Value Date     09/07/2024     09/06/2024     09/05/2024    K 4.0 09/07/2024    K 4.2 09/06/2024    K 4.0 09/06/2024     (H) 09/07/2024     (H) 09/06/2024     (H) 09/05/2024    CO2 23 09/07/2024    CO2 26 09/06/2024    CO2 24 09/05/2024    BUN 24 (H) 09/07/2024    BUN 24 (H) 09/06/2024    BUN 23 09/05/2024    CREATININE 1.15 09/07/2024    CREATININE 1.15 09/06/2024    CREATININE 1.15 09/05/2024       CBC:  Lab Results   Component Value Date    WBC 6.8 09/07/2024    WBC 7.3 09/06/2024    WBC 10.4 09/05/2024    RBC 4.57 09/07/2024    RBC 4.52 09/06/2024    RBC 4.81 09/05/2024    HGB 14.2 09/07/2024    HGB 14.1 09/06/2024    HGB 15.3 09/05/2024    HCT 43.6 09/07/2024    HCT 42.8 09/06/2024    HCT 46.3 09/05/2024    MCV 95 09/07/2024    MCV 95 09/06/2024    MCV 96 09/05/2024    MCH 31.1 09/07/2024    MCH 31.2 09/06/2024    MCH 31.8 09/05/2024    MCHC 32.6 09/07/2024    MCHC 32.9 09/06/2024    MCHC 33.0 09/05/2024    RDW 13.2 09/07/2024    RDW 13.2 09/06/2024    RDW 13.0 09/05/2024     (L) 09/07/2024     09/06/2024     09/05/2024       Cardiac Enzymes:    Lab Results   Component Value Date    TROPHS 14,913 () 09/06/2024    TROPHS 14,127 (HH) 09/06/2024    TROPHS 217 (HH) 09/05/2024       Hepatic Function Panel:    Lab Results   Component Value Date    ALKPHOS 58 09/05/2024    ALT 8 (L) 09/05/2024    AST 10 09/05/2024    PROT 6.3 (L) 09/05/2024    BILITOT 0.7 09/05/2024         REVIEW OF SYSTEMS  Review of Systems   Constitutional: Negative.   Cardiovascular: Negative.    Respiratory: Negative.     Neurological: Negative.    All other systems reviewed and are negative.      VITALS  There were no vitals filed for this visit.  Wt Readings from Last 4 Encounters:   04/18/25 109 kg (240 lb)   03/21/25 111 kg (245 lb)   02/12/25 111 kg (245 lb)   02/12/25 111 kg (245 lb)       PHYSICAL  EXAM  Constitutional:       Appearance: Healthy appearance.   Eyes:      Pupils: Pupils are equal, round, and reactive to light.   Pulmonary:      Effort: Pulmonary effort is normal.      Breath sounds: Normal breath sounds.   Cardiovascular:      PMI at left midclavicular line. Normal rate. Regular rhythm.      Murmurs: There is no murmur.      No gallop.  No click. No rub.   Pulses:     Intact distal pulses.   Musculoskeletal: Normal range of motion.      Cervical back: Normal range of motion. Skin:     General: Skin is warm and dry.   Neurological:      General: No focal deficit present.      Mental Status: Alert and oriented to person, place and time.           Marvel HYMAN LPN   am scribing for, and in the presence of Dr. Dale Ellington MD  .    Dr. Dale HYMAN MD  , personally performed the services described in the documentation as scribed by Marvel Solis LPN   in my presence, and confirm it is both accurate and complete.      Dr. Dale Ellington MD  Thank you for allowing me to participate in the care of this patient. Please do not hesitate to contact me with any further questions or concerns.         [1]   Past Medical History:  Diagnosis Date    Acute maxillary sinusitis, unspecified 07/27/2020    Subacute maxillary sinusitis    Cellulitis of right finger 06/03/2019    Paronychia of finger of right hand    CHF (congestive heart failure)     Encounter for immunization     Encounter for immunization    Encounter for screening for lipoid disorders 12/03/2019    Screening, lipid    Encounter for screening for malignant neoplasm of colon 05/13/2021    Encounter for screening for colorectal malignant neoplasm    Encounter for screening for malignant neoplasm of prostate 12/03/2019    Screening PSA (prostate specific antigen)    Hypertension     Impingement syndrome of left shoulder     Impingement syndrome of left shoulder    Personal history of other diseases of the musculoskeletal system and  connective tissue     History of arthritis    Personal history of other drug therapy 05/28/2020    History of pneumococcal vaccination    Personal history of other specified conditions 08/29/2019    History of fatigue   [2]   Social History  Tobacco Use    Smoking status: Never     Passive exposure: Never    Smokeless tobacco: Never   Substance Use Topics    Alcohol use: Never    Drug use: Never   [3]   Family History  Problem Relation Name Age of Onset    Diabetes type II Mother      Hypertension Mother      Diabetes Mother      Other (CEREBRALVASCULAR ACCIDENT) Mother      Hypertension Father      Other (URINARY BLADDER CA) Father     [4] No Known Allergies

## 2025-04-25 ENCOUNTER — APPOINTMENT (OUTPATIENT)
Dept: ORTHOPEDIC SURGERY | Facility: CLINIC | Age: 70
End: 2025-04-25
Payer: MEDICARE

## 2025-04-29 ENCOUNTER — APPOINTMENT (OUTPATIENT)
Dept: ORTHOPEDIC SURGERY | Facility: CLINIC | Age: 70
End: 2025-04-29
Payer: MEDICARE

## 2025-04-29 DIAGNOSIS — M77.51 RETROCALCANEAL BURSITIS (BACK OF HEEL), RIGHT: ICD-10-CM

## 2025-04-29 DIAGNOSIS — M76.61 RIGHT ACHILLES TENDINITIS: Primary | ICD-10-CM

## 2025-04-29 PROCEDURE — 1123F ACP DISCUSS/DSCN MKR DOCD: CPT | Performed by: STUDENT IN AN ORGANIZED HEALTH CARE EDUCATION/TRAINING PROGRAM

## 2025-04-29 PROCEDURE — 99213 OFFICE O/P EST LOW 20 MIN: CPT | Performed by: STUDENT IN AN ORGANIZED HEALTH CARE EDUCATION/TRAINING PROGRAM

## 2025-04-29 NOTE — PROGRESS NOTES
"  Acute Injury Established Patient Visit    HPI: Alex is a 70 y.o.male who presents today for follow-up of his right Achilles tendinitis and right retrocalcaneal bursitis.  States that he is feeling much better overall.  He was doing great, but yesterday he did some work in the yard and was wearing some different shoes, and had a slight return of pain.  States that it is already improving again.  Denies interval falls or injuries.  Only has some pain with rubbing at the retrocalcaneal bursa at this time.  The boot worked well.  He kept it on for about a week.  Prednisone given to him for his knee also help the knee.  He is not having any current pain.  He was approved for viscosupplementation.  States that he does not think he needs it at this time.    On 4/1/2025, he presented for concerns of right heel pain.  He had been seen by my partner, Dr. Cole Budinsky, for similar complaint last week, and he was offered a boot, but declined at that time, and has held off on his Medrol Dosepak as he is concerned that it will elevate his blood pressure.  He states that he had been feeling better.  He did some walking on the treadmill over the weekend and had a return of some pain.  Most the time it is not bothering him.  He denies any interval falls or injuries.  He was inquiring about a possible injection into the Achilles tendon sheath.    On 3/26/2025, he presented for follow-up of his left knee osteoarthritis for pseudogout flare and is status post corticosteroid injection on 2/12/2025.  States that the injection worked very well.  He feels \"terrific\".  He has not had no pain the past 3 to 4 weeks.  Within 2 to 3 days, the pain began to dissipate and for a week or 2 afterwards would only have occasional, intermittent pain.  Denies any interval falls or injuries.  No other complaints or concerns at this time.    On 2/12/2025, he presented with new complaints of left knee pain.  Of note, he saw my partner, Dr. Bruce, on " 1/15/2025, and was diagnosed with left knee osteoarthritis as well as chondrocalcinosis/pseudogout.  He was also seen in the emergency department around the same time and given prednisone.  He decided to try a course of oral prednisone, which within 2 days relieved his pain, but unfortunately over the past 2 days his pain has insidiously returned.  He denies any interval falls or injuries.  He denies any significant swelling, bruising, erythema, or warmth.    Plan: Today, on 4/29/2025, we will have him continue conservative treatment measures for his Achilles tendinitis and retrocalcaneal bursitis and regarding his left knee, can return at anytime for viscosupplementation if he begins to have worsening pain at the knee.  Otherwise, follow-up as needed.    On 4/1/2025, for this right Achilles tendinitis and right retrocalcaneal bursitis, given its mild severity at this time and ability to show improvement with rest, will start him on colchicine, physical therapy, tall boot with a heel lift.  He can come out of the boot after 7 to 10 days as he felt comfortable to return to his shoes with the heel lifts in the shoes.  We will hold off on any injections at this time.      On 3/26/2025, have him follow-up as needed, and would certainly consider subsequent corticosteroid injections of the knee as this seemed to work well, and can safely do this every 3 to 4 months as needed going forward.  He understands agrees with plan.  Will follow-up as needed.  He can return to activity as tolerated.    On 2/12/2025, for this left knee osteoarthritis versus pseudogout flare, provided him with a corticosteroid injection under ultrasound guidance which was tolerated well without complications.  Postinjection instructions given.  Will follow-up in 6 weeks for further evaluation.  Encouraged patient to see his primary care physician for further evaluation of underlying causes of his pseudogout.  Additionally, discussed conservative  treatment measures including rest, ice, elevation, compression, and over-the-counter analgesia as needed and as appropriate.  Risks of NSAID use, steroid use, and muscle relaxers discussed in depth and considered in light of medical comorbidities.  Patient, and parent/guardian as applicable, understand agree with plan.  Follow-up: As needed  X-rays on follow-up: None      Assessment:   Problem List Items Addressed This Visit    None  Visit Diagnoses         Right Achilles tendinitis    -  Primary      Retrocalcaneal bursitis (back of heel), right                      Diagnostics: Reviewed all relevant imaging including x-ray, MRI, CT, and US.      Procedure:  Procedures    Physical Exam:  GENERAL:  No obvious acute distress.  NEURO:  Distally neurovascularly intact.  Sensation intact to light touch.  Extremity: Right ankle exam shows:  Skin is intact;  No erythema or warmth;  No clinical signs of infection;  No pain over the lateral malleolus;  No pain over the medial malleolus;  No pain over the ATF, CF or PTF ligaments;  No pain over the deltoid ligament;  Vastly improved TENDER over the Achilles tendon with mild swelling over the retrocalcaneal bursa with no overlying signs of infection including no erythema, warmth, or drainage;  Negative Leon's test;  Negative squeeze test;  Negative anterior drawer test;  Negative talar tilt test;  No pain over the anterior process of the talus;  No pain over the talar dome;  No pain over the base of the fifth metatarsal bone;  No pain over the calcaneus;  No pain over the plantar aponeurosis;  No pain of the midfoot; and  Neurovascularly intact.    No orders of the defined types were placed in this encounter.     At the conclusion of the visit there were no further questions by the patient/family regarding their plan of care.  Patient was instructed to call or return with any issues, questions, or concerns regarding their injury and/or treatment plan described above.      04/29/25 at 11:07 AM - Ghanshyam Sharp, DO    Office: [phone]    This note was prepared using voice recognition software.  The details of this note are correct and have been reviewed, and corrected to the best of my ability.  Some grammatical errors may persist related to the Dragon software.

## 2025-05-22 ENCOUNTER — HOSPITAL ENCOUNTER (OUTPATIENT)
Dept: RADIOLOGY | Facility: EXTERNAL LOCATION | Age: 70
Discharge: HOME | End: 2025-05-22

## 2025-05-22 ENCOUNTER — OFFICE VISIT (OUTPATIENT)
Dept: ORTHOPEDIC SURGERY | Facility: CLINIC | Age: 70
End: 2025-05-22
Payer: MEDICARE

## 2025-05-22 DIAGNOSIS — M25.462 KNEE EFFUSION, LEFT: ICD-10-CM

## 2025-05-22 DIAGNOSIS — M17.12 LOCALIZED OSTEOARTHRITIS OF LEFT KNEE: ICD-10-CM

## 2025-05-22 DIAGNOSIS — R97.20 ELEVATED PSA: ICD-10-CM

## 2025-05-22 LAB
CLARITY FLD: ABNORMAL
COLOR FLD: ABNORMAL
CRYSTALS FLD MICRO: ABNORMAL
RBC # FLD AUTO: 2000 /UL
WBC # FLD AUTO: ABNORMAL /UL

## 2025-05-22 PROCEDURE — 89050 BODY FLUID CELL COUNT: CPT

## 2025-05-22 PROCEDURE — 87205 SMEAR GRAM STAIN: CPT

## 2025-05-22 PROCEDURE — 89060 EXAM SYNOVIAL FLUID CRYSTALS: CPT

## 2025-05-22 PROCEDURE — 87070 CULTURE OTHR SPECIMN AEROBIC: CPT

## 2025-05-22 PROCEDURE — 87075 CULTR BACTERIA EXCEPT BLOOD: CPT

## 2025-05-22 RX ORDER — BETAMETHASONE SODIUM PHOSPHATE AND BETAMETHASONE ACETATE 3; 3 MG/ML; MG/ML
18 INJECTION, SUSPENSION INTRA-ARTICULAR; INTRALESIONAL; INTRAMUSCULAR; SOFT TISSUE
Status: COMPLETED | OUTPATIENT
Start: 2025-05-22 | End: 2025-05-22

## 2025-05-22 RX ORDER — LIDOCAINE HYDROCHLORIDE 10 MG/ML
6 INJECTION, SOLUTION INFILTRATION; PERINEURAL
Status: COMPLETED | OUTPATIENT
Start: 2025-05-22 | End: 2025-05-22

## 2025-05-22 RX ADMIN — BETAMETHASONE SODIUM PHOSPHATE AND BETAMETHASONE ACETATE 18 MG: 3; 3 INJECTION, SUSPENSION INTRA-ARTICULAR; INTRALESIONAL; INTRAMUSCULAR; SOFT TISSUE at 11:56

## 2025-05-22 RX ADMIN — LIDOCAINE HYDROCHLORIDE 6 ML: 10 INJECTION, SOLUTION INFILTRATION; PERINEURAL at 11:56

## 2025-05-22 NOTE — PROGRESS NOTES
Acute Injury Established Patient Visit    Patient ID: Alex Alba is a 70 y.o. male  History of Present Illness  The patient is a 70-year-old male with worsening left knee osteoarthritis pain.    Left Knee Osteoarthritis Pain  - A steroid injection on 02/12/2025 provided significant relief.  - Pain recurred this week, initially medial, now across the kneecap, described as very tender.  - No falls, injuries, redness, warmth, or locking.  - Difficulty rising from a seated position due to weight-bearing issues.  - Can move without pain once standing and leg straightened, unable to bend the leg.  - Prednisone tablets prescribed post-injection provided relief within 3-4 days.    Supplemental information: Last appointment on 04/18/2025 noted gel injections approved but not administered as asymptomatic.       Assessment & Plan  1. Osteoarthritis of the left knee versus pseudogout:  - Evaluate for gel injection or steroid injection  - Discuss benefits and risks  - Administer gel injection if available, otherwise steroid injection  - Educate on osteoarthritis management, lifestyle modifications, weight management, and low-impact exercises  - Schedule follow-up to monitor progress and response to treatment       Assessment:   Problem List Items Addressed This Visit    None  Visit Diagnoses         Localized osteoarthritis of left knee        Relevant Orders    Point of Care Ultrasound (Completed)      Knee effusion, left        Relevant Orders    Body Fluid Cell Count    Crystal Identification and Pathologist Review Synovial Fluid    Sterile Fluid Culture/Smear            Diagnostics: Reviewed all relevant imaging including x-ray, MRI, CT, and US.    Results         Procedure:  L Inj/Asp: L knee on 5/22/2025 11:56 AM  Indications: pain and joint swelling  Details: 22 G needle, ultrasound-guided superolateral approach  Medications: 6 mL lidocaine 10 mg/mL (1 %); 18 mg betamethasone acet,sod phos 6 mg/mL  Aspirate: 51  mL yellow; sent for lab analysis (Cell count, culture, and crystals)  Outcome: tolerated well, no immediate complications  Procedure, treatment alternatives, risks and benefits explained, specific risks discussed. Consent was given by the patient. Immediately prior to procedure a time out was called to verify the correct patient, procedure, equipment, support staff and site/side marked as required. Patient was prepped and draped in the usual sterile fashion.           Physical Exam  - Musculoskeletal:    - Left Knee: Tenderness on the medial aspect    - No redness, warmth, or locking       Orders Placed This Encounter    L Inj/Asp    Sterile Fluid Culture/Smear    Point of Care Ultrasound    Body Fluid Cell Count    Crystal Identification and Pathologist Review Synovial Fluid    Crystal Identification, Synovial Fluid      At the conclusion of the visit there were no further questions by the patient/family regarding their plan of care.  Patient was instructed to call or return with any issues, questions, or concerns regarding their injury and/or treatment plan described above.     05/22/25 at 11:56 AM - Ghanshyam Sharp DO    Office: [phone]    This note was prepared using voice recognition software.  The details of this note are correct and have been reviewed, and corrected to the best of my ability.  Some grammatical errors may persist related to the Dragon software.  This medical note was created with the assistance of artificial intelligence (AI) for documentation purposes. The content has been reviewed and confirmed by the healthcare provider for accuracy and completeness. Patient consented to the use of audio recording and use of AI during their visit.

## 2025-05-23 LAB — PATH REVIEW-CRYSTALS: NORMAL

## 2025-05-24 LAB
ALBUMIN SERPL-MCNC: 3.7 G/DL (ref 3.6–5.1)
ALP SERPL-CCNC: 65 U/L (ref 35–144)
ALT SERPL-CCNC: 9 U/L (ref 9–46)
ANION GAP SERPL CALCULATED.4IONS-SCNC: 12 MMOL/L (CALC) (ref 7–17)
AST SERPL-CCNC: 10 U/L (ref 10–35)
BASOPHILS # BLD AUTO: 13 CELLS/UL (ref 0–200)
BASOPHILS NFR BLD AUTO: 0.1 %
BILIRUB SERPL-MCNC: 0.7 MG/DL (ref 0.2–1.2)
BUN SERPL-MCNC: 29 MG/DL (ref 7–25)
CALCIUM SERPL-MCNC: 9.2 MG/DL (ref 8.6–10.3)
CHLORIDE SERPL-SCNC: 109 MMOL/L (ref 98–110)
CHOLEST SERPL-MCNC: 142 MG/DL
CHOLEST/HDLC SERPL: 2.4 (CALC)
CO2 SERPL-SCNC: 25 MMOL/L (ref 20–32)
CREAT SERPL-MCNC: 1.11 MG/DL (ref 0.7–1.28)
EGFRCR SERPLBLD CKD-EPI 2021: 71 ML/MIN/1.73M2
EOSINOPHIL # BLD AUTO: 0 CELLS/UL (ref 15–500)
EOSINOPHIL NFR BLD AUTO: 0 %
ERYTHROCYTE [DISTWIDTH] IN BLOOD BY AUTOMATED COUNT: 13 % (ref 11–15)
EST. AVERAGE GLUCOSE BLD GHB EST-MCNC: 137 MG/DL
EST. AVERAGE GLUCOSE BLD GHB EST-SCNC: 7.6 MMOL/L
GLUCOSE SERPL-MCNC: 142 MG/DL (ref 65–99)
HBA1C MFR BLD: 6.4 %
HCT VFR BLD AUTO: 46.7 % (ref 38.5–50)
HDLC SERPL-MCNC: 59 MG/DL
HGB BLD-MCNC: 15.1 G/DL (ref 13.2–17.1)
LDLC SERPL CALC-MCNC: 69 MG/DL (CALC)
LYMPHOCYTES # BLD AUTO: 554 CELLS/UL (ref 850–3900)
LYMPHOCYTES NFR BLD AUTO: 4.2 %
MCH RBC QN AUTO: 30.6 PG (ref 27–33)
MCHC RBC AUTO-ENTMCNC: 32.3 G/DL (ref 32–36)
MCV RBC AUTO: 94.5 FL (ref 80–100)
MONOCYTES # BLD AUTO: 634 CELLS/UL (ref 200–950)
MONOCYTES NFR BLD AUTO: 4.8 %
NEUTROPHILS # BLD AUTO: ABNORMAL CELLS/UL (ref 1500–7800)
NEUTROPHILS NFR BLD AUTO: 90.9 %
NONHDLC SERPL-MCNC: 83 MG/DL (CALC)
PLATELET # BLD AUTO: 295 THOUSAND/UL (ref 140–400)
PMV BLD REES-ECKER: 11 FL (ref 7.5–12.5)
POTASSIUM SERPL-SCNC: 5.4 MMOL/L (ref 3.5–5.3)
PROT SERPL-MCNC: 6.8 G/DL (ref 6.1–8.1)
RBC # BLD AUTO: 4.94 MILLION/UL (ref 4.2–5.8)
SODIUM SERPL-SCNC: 146 MMOL/L (ref 135–146)
TRIGL SERPL-MCNC: 64 MG/DL
WBC # BLD AUTO: 13.2 THOUSAND/UL (ref 3.8–10.8)

## 2025-05-25 LAB
BACTERIA FLD CULT: NORMAL
GRAM STN SPEC: NORMAL
GRAM STN SPEC: NORMAL

## 2025-05-30 ENCOUNTER — HOSPITAL ENCOUNTER (OUTPATIENT)
Dept: RADIOLOGY | Facility: CLINIC | Age: 70
Discharge: HOME | End: 2025-05-30
Payer: MEDICARE

## 2025-05-30 ENCOUNTER — OFFICE VISIT (OUTPATIENT)
Dept: ORTHOPEDIC SURGERY | Facility: CLINIC | Age: 70
End: 2025-05-30
Payer: MEDICARE

## 2025-05-30 DIAGNOSIS — M25.571 RIGHT ANKLE PAIN, UNSPECIFIED CHRONICITY: Primary | ICD-10-CM

## 2025-05-30 DIAGNOSIS — M25.571 RIGHT ANKLE PAIN, UNSPECIFIED CHRONICITY: ICD-10-CM

## 2025-05-30 DIAGNOSIS — M10.9 ACUTE GOUT OF RIGHT ANKLE, UNSPECIFIED CAUSE: ICD-10-CM

## 2025-05-30 PROCEDURE — 73610 X-RAY EXAM OF ANKLE: CPT | Mod: RT

## 2025-05-30 PROCEDURE — 99212 OFFICE O/P EST SF 10 MIN: CPT | Performed by: INTERNAL MEDICINE

## 2025-05-30 RX ORDER — COLCHICINE 0.6 MG/1
TABLET ORAL
Qty: 6 TABLET | Refills: 0 | Status: SHIPPED | OUTPATIENT
Start: 2025-05-30

## 2025-05-30 NOTE — PROGRESS NOTES
Acute Injury New Patient Visit    CC: No chief complaint on file.      HPI: Alex is a 70 y.o. male presents for increased pain and swelling the right ankle.  Does have a past medical history of gout in the past and pseudogout.  There was no fall or traumatic injury.  No new medications started.  No recent infection.  Gradual onset of pain of the right ankle with swelling.        Review of Systems   GENERAL: Negative for malaise, significant weight loss, fever  MUSCULOSKELETAL: See HPI  NEURO:  Negative for numbness / tingling     Past Medical History  Medical History[1]    Medication review  Medication Documentation Review Audit       Reviewed by Naveed James RN (Registered Nurse) on 04/21/25 at 0807      Medication Order Taking? Sig Documenting Provider Last Dose Status   acetaminophen (Tylenol) 325 mg tablet [id number] Yes Take 500 mg by mouth every 6 hours if needed for mild pain (1 - 3). Historical Provider, MD Not Taking Active   apixaban (Eliquis) 5 mg tablet [id number] Yes Take 1 tablet (5 mg) by mouth 2 times a day. Dale Ellnigton MD  Active   atorvastatin (Lipitor) 40 mg tablet [id number] Yes Take 1 tablet (40 mg) by mouth once daily at bedtime. Dale Ellington MD  Active   Jardiance 10 mg [id number] Yes TAKE 1 TABLET BY MOUTH EVERY DAY Dale Ellington MD  Active   methylPREDNISolone (Medrol Dospak) 4 mg tablets [id number]  Follow schedule on package instructions   Patient not taking: Reported on 4/21/2025    Cole C Budinsky, MD  Active   metoprolol succinate XL (Toprol-XL) 25 mg 24 hr tablet [id number] Yes TAKE 1 TABLET (25 MG) BY MOUTH DAILY DO NOT CRUSH OR CHEW Dale Ellington MD  Active   nitroglycerin (Nitrostat) 0.4 mg SL tablet [id number] Yes Place 1 tablet (0.4 mg) under the tongue every 5 minutes if needed for chest pain. Salma Nguyen, APRN-CNP Taking Active   predniSONE (Deltasone) 50 mg tablet [id number] Yes Take 1 tablet (50 mg) by mouth once daily for 5 days. Kim Bruce MD   Active   sacubitriL-valsartan (Entresto) 49-51 mg tablet [id number] Yes Take 1 tablet by mouth 2 times a day. Dale Ellington MD  Active   spironolactone (Aldactone) 25 mg tablet [id number] Yes Take 0.5 tablets (12.5 mg) by mouth every other day. Jason Frank MD Taking Active   tamsulosin (Flomax) 0.4 mg 24 hr capsule [id number] Yes Take 1 capsule (0.4 mg) by mouth 2 times a day. Darwin Hagan MD  Active   ticagrelor (Brilinta) 90 mg tablet [id number] Yes Take 1 tablet (90 mg) by mouth 2 times a day. Jason Frank MD  Active                    Allergies  RX Allergies[2]    Social History  Social History     Socioeconomic History    Marital status:      Spouse name: Not on file    Number of children: Not on file    Years of education: Not on file    Highest education level: Not on file   Occupational History    Not on file   Tobacco Use    Smoking status: Never     Passive exposure: Never    Smokeless tobacco: Never   Substance and Sexual Activity    Alcohol use: Never    Drug use: Never    Sexual activity: Defer   Other Topics Concern    Not on file   Social History Narrative    Not on file     Social Drivers of Health     Financial Resource Strain: Low Risk  (9/5/2024)    Overall Financial Resource Strain (CARDIA)     Difficulty of Paying Living Expenses: Not hard at all   Food Insecurity: Not on file   Transportation Needs: No Transportation Needs (9/7/2024)    PRAPARE - Transportation     Lack of Transportation (Medical): No     Lack of Transportation (Non-Medical): No   Physical Activity: Not on file   Stress: Not on file   Social Connections: Not on file   Intimate Partner Violence: Not on file   Housing Stability: Low Risk  (9/5/2024)    Housing Stability Vital Sign     Unable to Pay for Housing in the Last Year: No     Number of Times Moved in the Last Year: 0     Homeless in the Last Year: No       Surgical History  Surgical History[3]    Physical Exam:  GENERAL:  Patient is  awake, alert, and oriented to person place and time.  Patient appears well nourished and well kept.  Affect Calm, Not Acutely Distressed.  HEENT:  Normocephalic, Atraumatic, EOMI  CARDIOVASCULAR:  Hemodynamically stable.  RESPIRATORY:  Normal respirations with unlabored breathing.  Extremity: Right ankle shows skin is intact.  No erythema or warmth.  Swelling of the right ankle.  There is no pain to the lateral or medial malleolus.  There is no pain of the deltoid ligament.  No pain of the ATF, CF or PTF ligament.  Achilles tendon is intact.  Negative Leon's test.  Mild pain of the tibiotalar joint.  No pain of the talus.  No pain in the midfoot.  Distal pulses are palpable.  Satisfactory capillary refill time.      Diagnostics: X-rays reviewed      Procedure: None    Assessment: Right ankle gout    Plan: Alex presents here with right ankle swelling secondary to right ankle gout.  X-rays reviewed.  He has a history and physical examination consistent with right ankle gout, we did recommend oral colchicine for his acute gout.  He is scheduled to follow-up with his physician next week, may benefit from a metabolic and gout workup.    Orders Placed This Encounter    XR ankle right 3+ views      At the conclusion of the visit there were no further questions by the patient/family regarding their plan of care.  Patient was instructed to call or return with any issues, questions, or concerns regarding their injury and/or treatment plan described above.     05/30/25 at 4:22 PM - Kim Bruce MD    Office: [phone]    We already utilize the scribe attestation, Andrea HYMAN am scribing for, and in the presence of Dr. Bruce.    Kim HYMAN MD personally performed the services described in the documentation as scribed by Andrea Glaser in my presence, and confirm it is both accurate and complete.    This note was prepared using voice recognition software.  The details of this note are correct and  have been reviewed, and corrected to the best of my ability.  Some grammatical errors may persist related to the Dragon software.         [1]   Past Medical History:  Diagnosis Date    Acute maxillary sinusitis, unspecified 07/27/2020    Subacute maxillary sinusitis    Cellulitis of right finger 06/03/2019    Paronychia of finger of right hand    CHF (congestive heart failure)     Encounter for immunization     Encounter for immunization    Encounter for screening for lipoid disorders 12/03/2019    Screening, lipid    Encounter for screening for malignant neoplasm of colon 05/13/2021    Encounter for screening for colorectal malignant neoplasm    Encounter for screening for malignant neoplasm of prostate 12/03/2019    Screening PSA (prostate specific antigen)    Hypertension     Impingement syndrome of left shoulder     Impingement syndrome of left shoulder    Personal history of other diseases of the musculoskeletal system and connective tissue     History of arthritis    Personal history of other drug therapy 05/28/2020    History of pneumococcal vaccination    Personal history of other specified conditions 08/29/2019    History of fatigue   [2] No Known Allergies  [3]   Past Surgical History:  Procedure Laterality Date    CARDIAC CATHETERIZATION N/A 9/6/2024    Procedure: Left Heart Cath;  Surgeon: Lexa Kent MD;  Location: [facility];  Service: Cardiovascular;  Laterality: N/A;    CARDIAC CATHETERIZATION N/A 9/6/2024    Procedure: PCI;  Surgeon: Lexa Kent MD;  Location: [facility];  Service: Cardiovascular;  Laterality: N/A;    CARDIAC DEFIBRILLATOR PLACEMENT      OTHER SURGICAL HISTORY  10/19/2021    Prostate biopsy    OTHER SURGICAL HISTORY  06/15/2021    Hernia repair    OTHER SURGICAL HISTORY  06/15/2021    Colonoscopy

## 2025-06-02 NOTE — PROGRESS NOTES
Subjective   Patient ID: Alex Alba is a 70 y.o. male who presents for Medicare Annual Wellness Visit . I last saw the patient on 12/3/2024  .     HPI  Patient has labs to be discussed today. Patient states that he received a cortisone shot the day before he went to get labs drawn. He also states that 50 ml of fluid was removed from his knee the day before as well. Wondering if this has effect on results?     Patient has been having issues with pseudo-gout and gout itself. He states that he has pseudo gout in the left knee and actual gout in right foot. He states that these flares happen every 2-3 months. The most recent episode was 10 days ago in left knee and patient was given a cortisone shot . Patient states that the achilles tendon in the right foot rocha been bothering him still and he also admits to some swelling. He did take colchicine for the right ankle gout with improvement in his pain.    Patient would like a Rx of prednisone for his swelling.     Patient has lab results to be reviewed.     Past medical, surgical, and family history reviewed.  Reviewed and documented all medications   Pt eating well, exercising as tolerated and taking medications as directed.      Review of Systems  Except positives as noted in the CC & HPI      Constitutional: Denies fevers, chills, night sweats, fatigue, weight changes, change in appetite    Eyes: Denies blurry vision, double vision    ENT: Denies otalgia, trouble hearing, tinnitus, vertigo, nasal congestion, rhinorrhea, sore throat    Neck: Denies swelling, masses    Cardiovascular: Denies chest pain, palpitations, edema, orthopnea, syncope    Respiratory: Denies dyspnea, cough, wheezing, postural nocturnal dyspnea    Gastrointestinal: Denies abdominal pain, nausea, vomiting, diarrhea, constipation, melena, hematochezia    Genitourinary: Denies dysuria, hematuria  Musculoskeletal: Denies back pain, neck pain, arthralgias, myalgias    Integumentary: Denies skin  "lesions, rashes, masses    Neurological: Denies dizziness, headaches, confusion, limb weakness, paresthesias, syncope, convulsions    Psychiatric: Denies depression, anxiety, homicidal ideations, suicidal ideations, sleep disturbances    Endocrine: Denies polyphagia, polydipsia, polyuria, weakness, hair thinning, heat intolerance, cold intolerance, weight changes    Heme/Lymph: Denies easy bruising, easy bleeding, swollen glands    Objective   Visit Vitals  /66 (BP Location: Right arm, Patient Position: Sitting, BP Cuff Size: Large adult)   Pulse 61   Temp 36.4 °C (97.6 °F)   Resp 16   Ht 1.892 m (6' 2.5\")   Wt 112 kg (247 lb 6.4 oz)   SpO2 96%   BMI 31.34 kg/m²   Smoking Status Never   BSA 2.43 m²       Physical Exam    General Appearance - well-developed, well-nourished, 69 y.o., White male in no acute distress.      Skin - warm, pink and dry without rash or concerning lesions.      Mental Status - alert and oriented x 3. Normal mood and affect appropriate to mood.      Neck - supple without lymphadenopathy. Carotid pulses are normal without bruit on the right side but with left bruit. Thyroid is normal in midline without nodules.      Chest - lungs are clear to auscultation without rales, rhonchi or wheezes.      Heart - regular, rate and rhythm without murmurs, rubs or gallops.     Abdomen - soft, obese, protuberant, nontender, nondistended. No masses, hepatomegaly or splenomegaly is noted. No rebound, rigidity or guarding is noted. Bowel sounds are normoactive.      Extremities - no cyanosis, clubbing. Trace edema on the right ankle and no edema on the left. Pedal pulses are 2+ normal at the dorsalis pedis and posterior pulses bilaterally. Mild tenderness on the right side at insertion of achilles tendon.     Neurological - cranial nerves II through XII are grossly intact. Motor strength 5/5 at all fours.     Results  Reviewed lab results from 05/23/2025.      Lab Results   Component Value Date    ALBUMIN " 3.7 05/23/2025    ALT 9 05/23/2025    AST 10 05/23/2025    BUN 29 (H) 05/23/2025    CALCIUM 9.2 05/23/2025     05/23/2025    CHOL 142 05/23/2025    CO2 25 05/23/2025    CREATININE 1.11 05/23/2025    EGFR 71 05/23/2025    GLUCOSE 142 (H) 05/23/2025    HDL 59 05/23/2025    HCT 46.7 05/23/2025    HGB 15.1 05/23/2025    HGBA1C 6.4 (H) 05/23/2025    K 5.4 (H) 05/23/2025    LDLCALC 69 05/23/2025     05/23/2025     05/23/2025    PSASCREEN 3.93 05/28/2020    TRIG 64 05/23/2025    TSH 2.95 06/29/2023    WBC 13.2 (H) 05/23/2025        Assessment   1. Encounter for annual wellness exam in Medicare patient        2. Hyperglycemia        3. Prehypertension        4. BPH with obstruction/lower urinary tract symptoms        5. Acute idiopathic gout of right ankle  predniSONE (Deltasone) 20 mg tablet      6. Leukocytosis, unspecified type  CBC and Auto Differential    CBC and Auto Differential      7. Bruit of left carotid artery  Vascular US Carotid Artery Duplex Bilateral      8. Achilles tendinitis of right lower extremity        9. Class 1 obesity due to excess calories with serious comorbidity and body mass index (BMI) of 31.0 to 31.9 in adult            Patient to continue current medications (with any exceptions as noted) and diet. Follow-up in 6 month(s) otherwise as needed.      Will obtain CBC and Auto Differential prior to patient's next appointment in a month. Will call patient with results when available.     Ordered Vascular US Carotid Artery Duplex Bilateral. Will call patient with results when available.       Patient was started on:   Prednisone 20 mg, TAKE 2 TABS IN THE MORNING FOR 3 DAYS THEN 1 TAB IN THE MORNING FOR 3 DAYS     Rx(s) sent to pharmacy.      Patient is to follow up with Cardiology and Orthopedic surgery as scheduled.     Scribe Attestation  By signing my name below, Waleska HYMAN Scribe   attest that this documentation has been prepared under the direction and in the presence  of Jenifer Flores MD.      All medical record entries made by the scribe were at my direction and personally dictated by me. I have reviewed the chart and agree that the record accurately reflects my personal performance of the history, physical exam, discussion, and plan.     JENIFER FLORES M.D.

## 2025-06-03 ENCOUNTER — APPOINTMENT (OUTPATIENT)
Dept: PRIMARY CARE | Facility: CLINIC | Age: 70
End: 2025-06-03
Payer: MEDICARE

## 2025-06-03 VITALS
WEIGHT: 247.4 LBS | OXYGEN SATURATION: 96 % | HEART RATE: 61 BPM | SYSTOLIC BLOOD PRESSURE: 118 MMHG | BODY MASS INDEX: 30.76 KG/M2 | RESPIRATION RATE: 16 BRPM | TEMPERATURE: 97.6 F | DIASTOLIC BLOOD PRESSURE: 66 MMHG | HEIGHT: 75 IN

## 2025-06-03 DIAGNOSIS — R09.89 BRUIT OF LEFT CAROTID ARTERY: ICD-10-CM

## 2025-06-03 DIAGNOSIS — Z00.00 ENCOUNTER FOR ANNUAL WELLNESS EXAM IN MEDICARE PATIENT: Primary | ICD-10-CM

## 2025-06-03 DIAGNOSIS — M76.61 ACHILLES TENDINITIS OF RIGHT LOWER EXTREMITY: ICD-10-CM

## 2025-06-03 DIAGNOSIS — D72.829 LEUKOCYTOSIS, UNSPECIFIED TYPE: ICD-10-CM

## 2025-06-03 DIAGNOSIS — R03.0 PREHYPERTENSION: ICD-10-CM

## 2025-06-03 DIAGNOSIS — R73.9 HYPERGLYCEMIA: ICD-10-CM

## 2025-06-03 DIAGNOSIS — N13.8 BPH WITH OBSTRUCTION/LOWER URINARY TRACT SYMPTOMS: ICD-10-CM

## 2025-06-03 DIAGNOSIS — N40.1 BPH WITH OBSTRUCTION/LOWER URINARY TRACT SYMPTOMS: ICD-10-CM

## 2025-06-03 DIAGNOSIS — E66.09 CLASS 1 OBESITY DUE TO EXCESS CALORIES WITH SERIOUS COMORBIDITY AND BODY MASS INDEX (BMI) OF 31.0 TO 31.9 IN ADULT: ICD-10-CM

## 2025-06-03 DIAGNOSIS — M10.071 ACUTE IDIOPATHIC GOUT OF RIGHT ANKLE: ICD-10-CM

## 2025-06-03 DIAGNOSIS — E66.811 CLASS 1 OBESITY DUE TO EXCESS CALORIES WITH SERIOUS COMORBIDITY AND BODY MASS INDEX (BMI) OF 31.0 TO 31.9 IN ADULT: ICD-10-CM

## 2025-06-03 RX ORDER — PREDNISONE 20 MG/1
TABLET ORAL
Qty: 9 TABLET | Refills: 0 | Status: SHIPPED | OUTPATIENT
Start: 2025-06-03 | End: 2025-06-09

## 2025-06-03 ASSESSMENT — ANXIETY QUESTIONNAIRES
IF YOU CHECKED OFF ANY PROBLEMS ON THIS QUESTIONNAIRE, HOW DIFFICULT HAVE THESE PROBLEMS MADE IT FOR YOU TO DO YOUR WORK, TAKE CARE OF THINGS AT HOME, OR GET ALONG WITH OTHER PEOPLE: NOT DIFFICULT AT ALL
3. WORRYING TOO MUCH ABOUT DIFFERENT THINGS: NOT AT ALL
7. FEELING AFRAID AS IF SOMETHING AWFUL MIGHT HAPPEN: NOT AT ALL
GAD7 TOTAL SCORE: 0
4. TROUBLE RELAXING: NOT AT ALL
2. NOT BEING ABLE TO STOP OR CONTROL WORRYING: NOT AT ALL
5. BEING SO RESTLESS THAT IT IS HARD TO SIT STILL: NOT AT ALL
6. BECOMING EASILY ANNOYED OR IRRITABLE: NOT AT ALL
1. FEELING NERVOUS, ANXIOUS, OR ON EDGE: NOT AT ALL

## 2025-06-03 ASSESSMENT — ENCOUNTER SYMPTOMS
LOSS OF SENSATION IN FEET: 0
OCCASIONAL FEELINGS OF UNSTEADINESS: 0
DEPRESSION: 0

## 2025-06-03 ASSESSMENT — PATIENT HEALTH QUESTIONNAIRE - PHQ9
1. LITTLE INTEREST OR PLEASURE IN DOING THINGS: NOT AT ALL
SUM OF ALL RESPONSES TO PHQ9 QUESTIONS 1 AND 2: 0
2. FEELING DOWN, DEPRESSED OR HOPELESS: NOT AT ALL

## 2025-06-03 ASSESSMENT — ACTIVITIES OF DAILY LIVING (ADL)
MANAGING_FINANCES: INDEPENDENT
BATHING: INDEPENDENT
TAKING_MEDICATION: INDEPENDENT
GROCERY_SHOPPING: INDEPENDENT
DOING_HOUSEWORK: INDEPENDENT
DRESSING: INDEPENDENT

## 2025-06-03 NOTE — PROGRESS NOTES
"Subjective   Reason for Visit: Alex Alba is an 70 y.o. male here for a Medicare Wellness visit.     Past Medical, Surgical, and Family History reviewed and updated in chart.    Reviewed all medications by prescribing practitioner or clinical pharmacist (such as prescriptions, OTCs, herbal therapies and supplements) and documented in the medical record.    HPI    Patient Care Team:  Rodney Flores MD as PCP - General  Dale Ellington MD as Cardiologist (Cardiology)  Deborah Lawler MD as Cardiologist (Electrophysiology)  SELENA Vazquez-CNP as Nurse Practitioner (Cardiology)     Review of Systems    Objective   Vitals:  /66 (BP Location: Right arm, Patient Position: Sitting, BP Cuff Size: Large adult)   Pulse 61   Temp 36.4 °C (97.6 °F)   Resp 16   Ht 1.892 m (6' 2.5\")   Wt 112 kg (247 lb 6.4 oz)   SpO2 96%   BMI 31.34 kg/m²       Physical Exam    Assessment & Plan  Encounter for annual wellness exam in Medicare patient         Hyperglycemia         Prehypertension         BPH with obstruction/lower urinary tract symptoms         Acute idiopathic gout of right ankle    Orders:    predniSONE (Deltasone) 20 mg tablet; Take 2 tablets (40 mg) by mouth once daily for 3 days, THEN 1 tablet (20 mg) once daily for 3 days.    Leukocytosis, unspecified type    Orders:    CBC and Auto Differential; Future    Bruit of left carotid artery    Orders:    Vascular US Carotid Artery Duplex Bilateral; Future    Achilles tendinitis of right lower extremity         Class 1 obesity due to excess calories with serious comorbidity and body mass index (BMI) of 31.0 to 31.9 in adult                   "

## 2025-06-09 ENCOUNTER — HOSPITAL ENCOUNTER (OUTPATIENT)
Dept: CARDIOLOGY | Facility: HOSPITAL | Age: 70
Discharge: HOME | End: 2025-06-09
Payer: MEDICARE

## 2025-06-09 DIAGNOSIS — I44.7 LEFT BUNDLE BRANCH BLOCK (LBBB): ICD-10-CM

## 2025-06-09 DIAGNOSIS — Z95.810 ICD (IMPLANTABLE CARDIOVERTER-DEFIBRILLATOR) IN PLACE: ICD-10-CM

## 2025-06-09 DIAGNOSIS — I42.8 LEFT VENTRICULAR NON-COMPACTION CARDIOMYOPATHY (MULTI): ICD-10-CM

## 2025-06-09 PROCEDURE — 93284 PRGRMG EVAL IMPLANTABLE DFB: CPT

## 2025-06-11 DIAGNOSIS — M10.071 ACUTE IDIOPATHIC GOUT OF RIGHT ANKLE: Primary | ICD-10-CM

## 2025-06-11 RX ORDER — PREDNISONE 20 MG/1
TABLET ORAL
Qty: 9 TABLET | Refills: 0 | Status: SHIPPED | OUTPATIENT
Start: 2025-06-11 | End: 2025-06-17

## 2025-06-11 RX ORDER — COLCHICINE 0.6 MG/1
TABLET ORAL
Qty: 31 TABLET | Refills: 1 | Status: SHIPPED | OUTPATIENT
Start: 2025-06-11

## 2025-06-17 ENCOUNTER — HOSPITAL ENCOUNTER (OUTPATIENT)
Dept: RADIOLOGY | Facility: HOSPITAL | Age: 70
Discharge: HOME | End: 2025-06-17
Payer: MEDICARE

## 2025-06-17 DIAGNOSIS — R09.89 BRUIT OF LEFT CAROTID ARTERY: ICD-10-CM

## 2025-06-17 PROCEDURE — 93880 EXTRACRANIAL BILAT STUDY: CPT | Performed by: RADIOLOGY

## 2025-06-17 PROCEDURE — 93880 EXTRACRANIAL BILAT STUDY: CPT

## 2025-06-19 ENCOUNTER — APPOINTMENT (OUTPATIENT)
Dept: ORTHOPEDIC SURGERY | Facility: CLINIC | Age: 70
End: 2025-06-19
Payer: MEDICARE

## 2025-06-19 DIAGNOSIS — M17.12 LOCALIZED OSTEOARTHRITIS OF LEFT KNEE: Primary | ICD-10-CM

## 2025-06-19 DIAGNOSIS — M11.262 PSEUDOGOUT OF LEFT KNEE: ICD-10-CM

## 2025-06-19 NOTE — PROGRESS NOTES
Follow-Up Patient Visit  Patient ID: Alex Alba is a 70 y.o. male.    History of Present Illness  The patient is a 70-year-old male here for follow-up of his osteoarthritis of the left knee and pseudogout, confirmed with calcium pyrophosphate crystals from his aspirate on 5/22/2025.    He received an injection of the left knee with corticosteroid on 05/22/2025 and is approximately a month out from the injection. He reports significant improvement in his condition, estimating a 100 percent recovery. He experiences occasional stiffness but no pain. He has been maintaining an active lifestyle, including daily walks and regular exercise. His last injection provided relief for approximately 3 months before its effects diminished. He has resumed playing golf without any noticeable limp. He reports no swelling in the affected area.    He expresses concern about potential interactions between allopurinol and his current medications, particularly his diuretic. He is currently on a diuretic for cardiac health, which necessitates frequent urination every 1.5 hours.    SOCIAL HISTORY  Exercise: Walking every day, playing golf.    Assessment & Plan  1. Osteoarthritis of the left knee:    Significant improvement noted following the corticosteroid injection administered on 05/22/2025, with no pain and only occasional stiffness. Continue daily walking routine and engage in activities such as golf. If symptoms persist for at least 3 months, another injection may be considered.    2. Pseudogout:    Consult primary care physician for long-term management of pseudogout. Potential treatment options include low-dose anti-inflammatory medications such as colchicine or indomethacin. Stay well-hydrated, especially since on a diuretic for heart condition, which could potentially exacerbate pseudogout.    Follow-up as needed       No orders of the defined types were placed in this encounter.      1. Localized osteoarthritis of  left knee    2. Pseudogout of left knee        Procedures    Physical Exam  Musculoskeletal:  Gait: Normal gait, no issues observed  Left knee: No significant swelling or warmth, great strength    Results      At the conclusion of the visit there were no further questions by the patient/family regarding their plan of care.  Patient was instructed to call or return with any issues, questions, or concerns regarding their injury and/or treatment plan described above.      This medical note was created with the assistance of artificial intelligence (AI) for documentation purposes. The content has been reviewed and confirmed by the healthcare provider for accuracy and completeness. Patient consented to the use of audio recording and use of AI during their visit.   06/19/25 at 11:09 AM - Ghanshyam Sharp DO  Office:  [phone]

## 2025-06-22 DIAGNOSIS — I65.22 LEFT CAROTID ARTERY STENOSIS: Primary | ICD-10-CM

## 2025-06-23 NOTE — RESULT ENCOUNTER NOTE
Please call the patient regarding his abnormal result.  Carotid duplex ultrasound does reveal left carotid artery stenosis of greater than 70% and less than 50% on the right.  Recommend CT angiogram for further evaluation.

## 2025-06-27 ENCOUNTER — APPOINTMENT (OUTPATIENT)
Dept: RADIOLOGY | Facility: HOSPITAL | Age: 70
End: 2025-06-27
Payer: MEDICARE

## 2025-06-27 ENCOUNTER — HOSPITAL ENCOUNTER (EMERGENCY)
Facility: HOSPITAL | Age: 70
Discharge: HOME | End: 2025-06-27
Attending: STUDENT IN AN ORGANIZED HEALTH CARE EDUCATION/TRAINING PROGRAM
Payer: MEDICARE

## 2025-06-27 VITALS
BODY MASS INDEX: 30.46 KG/M2 | SYSTOLIC BLOOD PRESSURE: 135 MMHG | TEMPERATURE: 97.9 F | WEIGHT: 245 LBS | HEIGHT: 75 IN | RESPIRATION RATE: 18 BRPM | DIASTOLIC BLOOD PRESSURE: 73 MMHG | HEART RATE: 75 BPM | OXYGEN SATURATION: 96 %

## 2025-06-27 DIAGNOSIS — S43.401A SPRAIN OF RIGHT SHOULDER, UNSPECIFIED SHOULDER SPRAIN TYPE, INITIAL ENCOUNTER: Primary | ICD-10-CM

## 2025-06-27 PROCEDURE — 99283 EMERGENCY DEPT VISIT LOW MDM: CPT | Performed by: STUDENT IN AN ORGANIZED HEALTH CARE EDUCATION/TRAINING PROGRAM

## 2025-06-27 PROCEDURE — 2500000005 HC RX 250 GENERAL PHARMACY W/O HCPCS: Performed by: STUDENT IN AN ORGANIZED HEALTH CARE EDUCATION/TRAINING PROGRAM

## 2025-06-27 PROCEDURE — 73030 X-RAY EXAM OF SHOULDER: CPT | Mod: RT

## 2025-06-27 PROCEDURE — 2500000001 HC RX 250 WO HCPCS SELF ADMINISTERED DRUGS (ALT 637 FOR MEDICARE OP): Performed by: STUDENT IN AN ORGANIZED HEALTH CARE EDUCATION/TRAINING PROGRAM

## 2025-06-27 PROCEDURE — 73030 X-RAY EXAM OF SHOULDER: CPT | Mod: RIGHT SIDE | Performed by: RADIOLOGY

## 2025-06-27 RX ORDER — LIDOCAINE 560 MG/1
1 PATCH PERCUTANEOUS; TOPICAL; TRANSDERMAL ONCE
Status: DISCONTINUED | OUTPATIENT
Start: 2025-06-27 | End: 2025-06-27 | Stop reason: HOSPADM

## 2025-06-27 RX ORDER — OXYCODONE HYDROCHLORIDE 5 MG/1
5 TABLET ORAL EVERY 6 HOURS PRN
Qty: 8 TABLET | Refills: 0 | Status: SHIPPED | OUTPATIENT
Start: 2025-06-27 | End: 2025-06-29

## 2025-06-27 RX ORDER — ACETAMINOPHEN 325 MG/1
650 TABLET ORAL ONCE
Status: COMPLETED | OUTPATIENT
Start: 2025-06-27 | End: 2025-06-27

## 2025-06-27 RX ORDER — METHOCARBAMOL 500 MG/1
1000 TABLET, FILM COATED ORAL ONCE
Status: COMPLETED | OUTPATIENT
Start: 2025-06-27 | End: 2025-06-27

## 2025-06-27 RX ORDER — METHOCARBAMOL 500 MG/1
500 TABLET, FILM COATED ORAL 3 TIMES DAILY
Qty: 21 TABLET | Refills: 0 | Status: SHIPPED | OUTPATIENT
Start: 2025-06-27 | End: 2025-07-04

## 2025-06-27 RX ADMIN — METHOCARBAMOL TABLETS 1000 MG: 500 TABLET, COATED ORAL at 06:32

## 2025-06-27 RX ADMIN — LIDOCAINE 1 PATCH: 4 PATCH TOPICAL at 06:29

## 2025-06-27 RX ADMIN — ACETAMINOPHEN 650 MG: 325 TABLET ORAL at 06:32

## 2025-06-27 ASSESSMENT — PAIN DESCRIPTION - LOCATION: LOCATION: SHOULDER

## 2025-06-27 ASSESSMENT — PAIN DESCRIPTION - PAIN TYPE: TYPE: ACUTE PAIN

## 2025-06-27 ASSESSMENT — PAIN DESCRIPTION - FREQUENCY: FREQUENCY: CONSTANT/CONTINUOUS

## 2025-06-27 ASSESSMENT — PAIN - FUNCTIONAL ASSESSMENT: PAIN_FUNCTIONAL_ASSESSMENT: 0-10

## 2025-06-27 ASSESSMENT — PAIN DESCRIPTION - DESCRIPTORS: DESCRIPTORS: THROBBING

## 2025-06-27 ASSESSMENT — PAIN DESCRIPTION - ORIENTATION: ORIENTATION: RIGHT

## 2025-06-27 ASSESSMENT — PAIN SCALES - GENERAL: PAINLEVEL_OUTOF10: 3

## 2025-06-27 NOTE — DISCHARGE INSTRUCTIONS
You were seen at [facility] ER for right shoulder pain after a fall two days ago. You were noted to have a normal exam other than tenderness of the top portion of the shoulder and some limited abduction (raising should laterally). You had an xray showing no dislocation or fracture. You were given pain medications.     You can take Tylenol 975mg every 6 hours and methocarbamol 1000mg every 8 hours for 5-7 days for pain    You can also take 5mg of oxycodone every 4-6 hours as needed for severe pain    You should continue taking all your home medications as prescribed.     You should follow-up with orthopaedics in the next 1-2 weeks for further evaluation for the need for further testing and management.     You should return to the ER for fevers of more than 100.4F for more than 5 more days, loss of consciousness, persistent vomiting and inability to tolerate hydration, one sided weakness/paralysis, chest pain, persistent abdominal pain, or any other concerns.

## 2025-06-27 NOTE — ED PROVIDER NOTES
HPI   Chief Complaint   Patient presents with    Shoulder Injury     Two days ago I fell while playing golf and hurt my right shoulder       Pt is a 71 y/o M w/ a PMHx of HFrEF (45%) s/p PPM/AICD who p/w right shoulder pain. Pt states that he was gulfing and he tripped over a divit in the grass getting his ball and fell on his right side. He did not hit his head. This happened two days ago but he really started having pain yesterday. His pain is on the top of the shoulder and he only take tylenol for his pain. He is able to actively move his arm but does have pain and some limited abduction. No numbness or tingling in his arm or hand and no deformity.               Patient History   Medical History[1]  Surgical History[2]  Family History[3]  Social History[4]    Physical Exam   ED Triage Vitals [06/27/25 0529]   Temperature Heart Rate Respirations BP   36.6 °C (97.9 °F) 75 18 135/73      Pulse Ox Temp Source Heart Rate Source Patient Position   96 % Temporal Monitor Sitting      BP Location FiO2 (%)     Left arm --       Physical Exam  Constitutional:       General: He is not in acute distress.     Appearance: He is not ill-appearing.   HENT:      Mouth/Throat:      Mouth: Mucous membranes are moist.   Eyes:      General: No scleral icterus.     Extraocular Movements: Extraocular movements intact.      Pupils: Pupils are equal, round, and reactive to light.   Cardiovascular:      Rate and Rhythm: Normal rate and regular rhythm.      Heart sounds: No murmur heard.  Pulmonary:      Effort: Pulmonary effort is normal. No respiratory distress.      Breath sounds: Normal breath sounds. No wheezing or rales.   Abdominal:      General: Abdomen is flat. Bowel sounds are normal.      Palpations: Abdomen is soft.      Tenderness: There is no abdominal tenderness.   Musculoskeletal:      Right lower leg: No edema.      Left lower leg: No edema.      Comments: Limited right arm abduction and point tenderness on superior aspect  of right shoulder  No gross deformity  +2 right radial pulses   Skin:     General: Skin is warm and dry.      Capillary Refill: Capillary refill takes less than 2 seconds.   Neurological:      General: No focal deficit present.      Mental Status: He is alert and oriented to person, place, and time.      Cranial Nerves: No cranial nerve deficit.      Motor: No weakness.   Psychiatric:         Mood and Affect: Mood normal.         Behavior: Behavior normal.           ED Course & MDM   Diagnoses as of 06/27/25 0734   Sprain of right shoulder, unspecified shoulder sprain type, initial encounter                 No data recorded     Monica Coma Scale Score: 15 (06/27/25 0529 : Erin Morrison, NIKOLAY)                           Medical Decision Making  Pt is a 71 y/o M w/ a PMHx of HFrEF (45%) s/p PPM/AICD who p/w right shoulder pain likely in the setting of right AC joint injury vs biceps tendon injury.  Will get:  - Right Should xray to r/o fracture and dislocation  - Give Pain medication  - Will likely be able to discharge home with pain medications and ortho follow-up        Procedure  Procedures       Demario Wakefield MD  06/27/25 0636       [1]   Past Medical History:  Diagnosis Date    Acute maxillary sinusitis, unspecified 07/27/2020    Subacute maxillary sinusitis    Cellulitis of right finger 06/03/2019    Paronychia of finger of right hand    CHF (congestive heart failure)     Encounter for immunization     Encounter for immunization    Encounter for screening for lipoid disorders 12/03/2019    Screening, lipid    Encounter for screening for malignant neoplasm of colon 05/13/2021    Encounter for screening for colorectal malignant neoplasm    Encounter for screening for malignant neoplasm of prostate 12/03/2019    Screening PSA (prostate specific antigen)    Hypertension     Impingement syndrome of left shoulder     Impingement syndrome of left shoulder    Personal history of other diseases of the  musculoskeletal system and connective tissue     History of arthritis    Personal history of other drug therapy 05/28/2020    History of pneumococcal vaccination    Personal history of other specified conditions 08/29/2019    History of fatigue   [2]   Past Surgical History:  Procedure Laterality Date    CARDIAC CATHETERIZATION N/A 9/6/2024    Procedure: Left Heart Cath;  Surgeon: Lexa Kent MD;  Location: [facility];  Service: Cardiovascular;  Laterality: N/A;    CARDIAC CATHETERIZATION N/A 9/6/2024    Procedure: PCI;  Surgeon: Lexa Kent MD;  Location: [facility];  Service: Cardiovascular;  Laterality: N/A;    CARDIAC DEFIBRILLATOR PLACEMENT      OTHER SURGICAL HISTORY  10/19/2021    Prostate biopsy    OTHER SURGICAL HISTORY  06/15/2021    Hernia repair    OTHER SURGICAL HISTORY  06/15/2021    Colonoscopy   [3]   Family History  Problem Relation Name Age of Onset    Diabetes type II Mother      Hypertension Mother      Diabetes Mother      Other (CEREBRALVASCULAR ACCIDENT) Mother      Hypertension Father      Other (URINARY BLADDER CA) Father     [4]   Social History  Tobacco Use    Smoking status: Never     Passive exposure: Never    Smokeless tobacco: Never   Substance Use Topics    Alcohol use: Never    Drug use: Never        Demario Wakefield MD  06/27/25 0733

## 2025-07-03 ENCOUNTER — APPOINTMENT (OUTPATIENT)
Dept: RADIOLOGY | Facility: HOSPITAL | Age: 70
End: 2025-07-03
Payer: MEDICARE

## 2025-07-03 DIAGNOSIS — I65.22 LEFT CAROTID ARTERY STENOSIS: ICD-10-CM

## 2025-07-03 PROCEDURE — 2550000001 HC RX 255 CONTRASTS: Performed by: FAMILY MEDICINE

## 2025-07-03 PROCEDURE — 70498 CT ANGIOGRAPHY NECK: CPT | Performed by: STUDENT IN AN ORGANIZED HEALTH CARE EDUCATION/TRAINING PROGRAM

## 2025-07-03 PROCEDURE — 70498 CT ANGIOGRAPHY NECK: CPT

## 2025-07-03 RX ADMIN — IOHEXOL 75 ML: 350 INJECTION, SOLUTION INTRAVENOUS at 10:49

## 2025-07-06 DIAGNOSIS — I65.22 INTERNAL CAROTID ARTERY STENOSIS, LEFT: Primary | ICD-10-CM

## 2025-07-06 NOTE — RESULT ENCOUNTER NOTE
Please call the patient regarding his abnormal result.  CT angiogram of the neck does reveal moderate to severe narrowing of the left vertebral artery due to calcified plaque and left carotid bulb stenosis of 67-71% from thick calcified/noncalcified plaque.  Recommend referral to Dr. Caro, vascular surgeon, for further evaluation and recommendations.  Patient to continue on atorvastatin and Eliquis/clopidogrel.

## 2025-07-09 ENCOUNTER — OFFICE VISIT (OUTPATIENT)
Dept: ORTHOPEDIC SURGERY | Facility: CLINIC | Age: 70
End: 2025-07-09
Payer: MEDICARE

## 2025-07-09 DIAGNOSIS — S43.401A SPRAIN OF RIGHT SHOULDER, UNSPECIFIED SHOULDER SPRAIN TYPE, INITIAL ENCOUNTER: ICD-10-CM

## 2025-07-09 PROCEDURE — 1036F TOBACCO NON-USER: CPT | Performed by: ORTHOPAEDIC SURGERY

## 2025-07-09 PROCEDURE — 99213 OFFICE O/P EST LOW 20 MIN: CPT | Performed by: ORTHOPAEDIC SURGERY

## 2025-07-09 PROCEDURE — 1159F MED LIST DOCD IN RCRD: CPT | Performed by: ORTHOPAEDIC SURGERY

## 2025-07-09 NOTE — PROGRESS NOTES
History: Alex is here for his right shoulder. He was playing golf two weeks ago when he had a fall onto the shoulder. He played two holes afterwards but it kept getting more sore and tight. Two days after that he went to the ER as it was increasingly painful where he was put on Robaxin. He feels that the shoulder is almost back to normal at this point.     Past medical history: Multiple  Medications: Multiple  Allergies: No known drug allergies    Please refer to the intake H&P regarding the patient's review of systems, family history and social history as was done today    HEENT: Normal  Lungs: Clear to auscultation  Heart: RRR  Abdomen: Soft, nontender  Skin: clear  Extremity: He has full overhead motion of the shoulder.  He has pain directly at the AC joint and some swelling noted.  There is pain with cross body maneuvers.  5 out of 5 shoulder strength in all groups tested.  No numbness or tingling.  Contralateral exam is normal for strength, motion, stability and neurovascular assessment.    Radiographs: Previous right shoulder x-rays from outside facility read as negative.  There is however a small fragment toward the AC joint superiorly suggestive of a an avulsion fracture.  There is also some calcifications around the joint noted.    Assessment: Right shoulder AC joint sprain with a small avulsion fracture    Plan: We discussed his options for treatment based on his imaging.  Overall he is starting to do much better.  He will avoid anything that causes any increased pain such as heavy overhead lifting. He can ice the shoulder aggressively several times a day.  He will work on gentle exercises but avoid golf for at least the next 3 to 4 weeks.  He can return over the next month if he has any persistent pain or issues.    All questions were answered today with the patient.    Scribe Attestation:  By signing my name below, Carolin HYMAN Scribe attest that this documentation has been prepared under the  direction and in the presence of Sal Mata MD.    Provider Attestation - Scribe documentation:  All medical record entries made by Carolin Waldron were at my direction and personally dictated by me, Sal Mata MD. I have reviewed the chart and agree that the record is accurate and I confirmed that it reflects my personal performance of the history, physical exam, discussion, and plan.

## 2025-07-22 LAB — PSA SERPL-MCNC: 4.16 NG/ML

## 2025-07-29 ENCOUNTER — OFFICE VISIT (OUTPATIENT)
Dept: ORTHOPEDIC SURGERY | Facility: CLINIC | Age: 70
End: 2025-07-29
Payer: MEDICARE

## 2025-07-29 DIAGNOSIS — M17.12 LOCALIZED OSTEOARTHRITIS OF LEFT KNEE: Primary | ICD-10-CM

## 2025-07-29 DIAGNOSIS — M11.262 PSEUDOGOUT OF LEFT KNEE: ICD-10-CM

## 2025-07-29 PROCEDURE — L1812 KO ELASTIC W/JOINTS PRE OTS: HCPCS | Performed by: STUDENT IN AN ORGANIZED HEALTH CARE EDUCATION/TRAINING PROGRAM

## 2025-07-29 PROCEDURE — 99213 OFFICE O/P EST LOW 20 MIN: CPT | Performed by: STUDENT IN AN ORGANIZED HEALTH CARE EDUCATION/TRAINING PROGRAM

## 2025-07-29 NOTE — PROGRESS NOTES
Follow-Up Patient Visit  Patient ID: Alex Alba is a 70 y.o. male.    History of Present Illness  The patient is a 70-year-old male here for follow-up of left knee pain.    Left Knee Pain  - Last injection on 05/22/2025 provided 2 months of relief.  - Last seen on 06/19/2025, reported 100% improvement.  - Pain has returned.  - No gel injections yet, may benefit from them.  - Relief from injection lasted 2 months, similar to previous instance.  - Insurance approved gel injections months ago, unsure if reapproval needed.  - Severe knee pain 5 days ago, now subsided.  - No redness or warmth in joint.  - Fluid drained from knee during last visit.    Unable to play golf for a month due to fall.  - X-ray post-fall showed no abnormalities.  - Informed of inflammation under collar bone a week later.  - Knee feels tight when sitting, loosens with movement.  - No pain while walking.    SOCIAL HISTORY  Exercise: Plays golf, not played in a month due to fall.  Diet: Cut out foods causing gout.    Assessment & Plan  1. Left knee pain:  - Pain returned after steroid injection on 05/22/2025, which provided 2 months of relief  - No redness or warmth, suggesting regular arthritis exacerbation  - Discussed gel injection for potential 6 months relief, may take up to 6 weeks for full effect  - Advised rest for 48 hours post-injection, avoid submerging knee in water, showering permissible  - Suggested brace use  - Scheduled for gel injection pending insurance approval  - If insufficient relief, consider repeat steroid injection after 08/22/2025  - If pain persists, joint replacement surgery may be considered  - Recommended discussing anti-inflammatory diet and lifestyle modifications with PCP  -  will schedule injection, potentially on Thursday or Wednesday       No orders of the defined types were placed in this encounter.      1. Localized osteoarthritis of left knee    2. Pseudogout of left knee         Procedures    Physical Exam  Musculoskeletal:  Left knee: No redness, warmth, or significant fluid accumulation.    Results      At the conclusion of the visit there were no further questions by the patient/family regarding their plan of care.  Patient was instructed to call or return with any issues, questions, or concerns regarding their injury and/or treatment plan described above.      This medical note was created with the assistance of artificial intelligence (AI) for documentation purposes. The content has been reviewed and confirmed by the healthcare provider for accuracy and completeness. Patient consented to the use of audio recording and use of AI during their visit.   07/29/25 at 11:47 AM - Ghanshyam hSarp DO  Office:  [phone]   87

## 2025-07-31 DIAGNOSIS — I50.9 CHF (NYHA CLASS III, ACC/AHA STAGE C) (MULTI): ICD-10-CM

## 2025-07-31 RX ORDER — SPIRONOLACTONE 25 MG/1
12.5 TABLET ORAL EVERY OTHER DAY
Qty: 23 TABLET | Refills: 3 | Status: SHIPPED | OUTPATIENT
Start: 2025-07-31

## 2025-07-31 NOTE — TELEPHONE ENCOUNTER
Received request for prescription refills for patient.   Patient follows with Dr. Ellington    Request is for spironolactone  Is patient currently on medication yes    Last OV 4/21/2025  Next OV 10/20/2025    Pended for signing and sent to provider

## 2025-08-06 ENCOUNTER — APPOINTMENT (OUTPATIENT)
Dept: UROLOGY | Facility: CLINIC | Age: 70
End: 2025-08-06
Payer: MEDICARE

## 2025-08-06 VITALS — DIASTOLIC BLOOD PRESSURE: 77 MMHG | TEMPERATURE: 96.9 F | HEART RATE: 87 BPM | SYSTOLIC BLOOD PRESSURE: 137 MMHG

## 2025-08-06 DIAGNOSIS — Z12.5 PROSTATE CANCER SCREENING: ICD-10-CM

## 2025-08-06 DIAGNOSIS — N13.8 BPH WITH OBSTRUCTION/LOWER URINARY TRACT SYMPTOMS: ICD-10-CM

## 2025-08-06 DIAGNOSIS — N40.1 BPH WITH OBSTRUCTION/LOWER URINARY TRACT SYMPTOMS: ICD-10-CM

## 2025-08-06 DIAGNOSIS — R97.20 ELEVATED PSA: Primary | ICD-10-CM

## 2025-08-06 LAB
POC APPEARANCE, URINE: CLEAR
POC BILIRUBIN, URINE: NEGATIVE
POC BLOOD, URINE: ABNORMAL
POC COLOR, URINE: YELLOW
POC GLUCOSE, URINE: ABNORMAL MG/DL
POC KETONES, URINE: NEGATIVE MG/DL
POC LEUKOCYTES, URINE: NEGATIVE
POC NITRITE,URINE: NEGATIVE
POC PH, URINE: 5.5 PH
POC PROTEIN, URINE: NEGATIVE MG/DL
POC SPECIFIC GRAVITY, URINE: 1.01
POC UROBILINOGEN, URINE: 0.2 EU/DL

## 2025-08-06 PROCEDURE — 1160F RVW MEDS BY RX/DR IN RCRD: CPT | Performed by: STUDENT IN AN ORGANIZED HEALTH CARE EDUCATION/TRAINING PROGRAM

## 2025-08-06 PROCEDURE — 1159F MED LIST DOCD IN RCRD: CPT | Performed by: STUDENT IN AN ORGANIZED HEALTH CARE EDUCATION/TRAINING PROGRAM

## 2025-08-06 PROCEDURE — 99204 OFFICE O/P NEW MOD 45 MIN: CPT | Performed by: STUDENT IN AN ORGANIZED HEALTH CARE EDUCATION/TRAINING PROGRAM

## 2025-08-06 PROCEDURE — G2211 COMPLEX E/M VISIT ADD ON: HCPCS | Performed by: STUDENT IN AN ORGANIZED HEALTH CARE EDUCATION/TRAINING PROGRAM

## 2025-08-06 PROCEDURE — 81003 URINALYSIS AUTO W/O SCOPE: CPT | Performed by: STUDENT IN AN ORGANIZED HEALTH CARE EDUCATION/TRAINING PROGRAM

## 2025-08-06 RX ORDER — TAMSULOSIN HYDROCHLORIDE 0.4 MG/1
0.4 CAPSULE ORAL 2 TIMES DAILY
Qty: 180 CAPSULE | Refills: 3 | Status: SHIPPED | OUTPATIENT
Start: 2025-08-06

## 2025-08-06 NOTE — PROGRESS NOTES
Scribed for Dr. Aguilar Rios by Roel Rodarte. I, Dr. Aguilar Rios have personally reviewed and agreed with the information entered by the Virtual Scribe. 08/06/25.    ASSESSMENT:  Problem List Items Addressed This Visit       BPH with obstruction/lower urinary tract symptoms    Relevant Medications    tamsulosin (Flomax) 0.4 mg 24 hr capsule    Elevated PSA - Primary    Relevant Orders    POCT UA Automated manually resulted (Completed)    PSA     Other Visit Diagnoses         Prostate cancer screening        Relevant Orders    PSA           PLAN:  #BPH with LUTS ~ 118g  Endorses benefit with taking tamsulosin BID for his BPH.   Tolerating the medication without side-effects.   Discussed risks of continued use and alternative treatment options.   Patient elects to continue tamsulosin, Rx refill sent to pharmacy.   Will reevaluate plan on an annual basis.    Discussion:  Discussed alternative options including an outlet procedure (Rezum, PVP, TURP, HoLEP). In his case, suspect he would most benefit from a HoLEP. Risks, benefits, and complications reviewed. He is not interested in surgery at this time. If he would like to pursue will refer to one of my colleagues.     #Elevated PSA  His PSA has remained elevated but considered stable, provided reassurance.   Follow up in one year with a PSA prior to.     #Abnormal UA  IO urinalysis showed 4+ glucosuria, and trace blood.   Send for microanalysis, consider hematuria work-up if >3 RBC's.     All questions were answered to the patient’s satisfaction.  Patient agrees with the plan and wishes to proceed.  Continue follow-up for ongoing care of his chronic medical conditions.       History of Present Illness (HPI):  Alex presents as a new patient for an evaluation.  The patient’s EMR has been reviewed.  Lives in [place], [place].    Hx of BPH (118g, on tamsulosin), and elevated PSA.   S/p prostate MRI and biopsy in 2021 which were negative.   Previously followed by   [facility].   Presents for annual follow up and PSA results.     TODAY: (08/06/25)  Reports he has been doing well overall.   Urinary symptoms have remained stable.   Continues to benefit with taking tamsulosin BID.   Denies any side-effects and would like to continue.   Typically takes in the morning.   DTF q1-1.5h; NTF q2-3h;   Able to sleep approx 3h/night.   Occasionally double-voids to empty.   Not interested in surgery for his BPH.     PSA trend:  4.16 ~ July 2025  4.97 ~ Aug 2024  3.77 ~ Aug 2023  5.49 ~ Feb 2023    Prostate MRI (07/02/21) reviewed  Prostate weight: 118g  PSAD: 0.04 ng/mL/g.  BPH only.     Prostate biopsy (08/12/21) reviewed.  Negative for malignancy.     PMH: HTN, HLD, CHF, shoulder impingement, arthritis, BPH, elevated PSA.   PSH: cardiac cath, ICD, hernia repair, prostate biopsy, vasectomy  FH: Bladder cancer (Father)  SH: Non-smoker.   Patient is caring for his aging parents who are in their 90s and living in nursing facilities.    Medical History[1]  Surgical History[2]  Family History[3]  Tobacco Use History[4]  Current Medications[5]  Allergies[6]  Past medical, surgical, family and social history in the chart was reviewed and is accurate including any additions to what is in this HPI.    REVIEW OF SYSTEMS (ROS):   Constitutional: denies any unintentional weight loss or change in strength.  Integumentary: denies any rashes or pruritus.  Eyes: denies any double vision or eye pain.  Ear/Nose/Mouth/Throat: denies any nosebleeds or gum bleeds.  Cardiovascular: denies any chest pain or syncope.  Respiratory: denies hemoptysis.  Gastrointestinal: denies nausea or vomiting.  Musculoskeletal: denies muscle cramping or weakness.  Neurologic: denies convulsions or seizures.  Hematologic/Lymphatic: denies bleeding tendencies.  Endocrine: denies heat/cold intolerance.  All other systems have been reviewed and are negative unless otherwise noted in the HPI.     OBJECTIVE:  Visit Vitals  /77    Pulse 87   Temp 36.1 °C (96.9 °F) (Temporal)     PHYSICAL EXAM:  Constitutional: No obvious distress.  Eyes: Non-injected conjunctiva, sclera clear, EOMI.  Ears/Nose/Mouth/Throat: No obvious drainage per ears or nose.  Cardiovascular: Extremities are warm and well perfused. No edema, cyanosis or pallor.  Respiratory: No audible wheezing/stridor; respirations do not appear labored.  Gastrointestinal: Abdomen soft, not distended.  Musculoskeletal: Normal ROM of extremities.  Skin: No obvious rashes or open sores.  Neurologic: Alert and oriented, CN 2-12 grossly intact.  Psychiatric: Answers questions appropriately with normal affect.  Hematologic/Lymphatic/Immunologic: No obvious bruises or sites of spontaneous bleeding.  Genitourinary: No CVA tenderness, bladder not palpable.     LABS & IMAGING:  Basic Labs:  Lab Results   Component Value Date    WBC 13.2 (H) 05/23/2025    HGB 15.1 05/23/2025    HCT 46.7 05/23/2025     05/23/2025     05/23/2025    K 5.4 (H) 05/23/2025     05/23/2025    ALT 9 05/23/2025    AST 10 05/23/2025    CREATININE 1.11 05/23/2025    BUN 29 (H) 05/23/2025    CO2 25 05/23/2025    TSH 2.95 06/29/2023    INR 1.3 (H) 09/05/2024       Scribed for Dr. Aguilar Rios by Roel Rodarte.  By signing my name below, I, López Beasley attest that this documentation has been prepared under the direction and in the presence of Aguilar Rios MD. All medical record entries made by the Scribe were at my direction or personally dictated by me. I have reviewed the chart and agree that the record accurately reflects my personal performance of the history, physical exam, discussion and plan.  08/06/25         [1]   Past Medical History:  Diagnosis Date    Acute maxillary sinusitis, unspecified 07/27/2020    Subacute maxillary sinusitis    Benign prostatic hyperplasia     Cellulitis of right finger 06/03/2019    Paronychia of finger of right hand    CHF (congestive heart failure)      Elevated PSA     Encounter for immunization     Encounter for immunization    Encounter for screening for lipoid disorders 12/03/2019    Screening, lipid    Encounter for screening for malignant neoplasm of colon 05/13/2021    Encounter for screening for colorectal malignant neoplasm    Encounter for screening for malignant neoplasm of prostate 12/03/2019    Screening PSA (prostate specific antigen)    Heart disease     Hypertension     Impingement syndrome of left shoulder     Impingement syndrome of left shoulder    Personal history of other diseases of the musculoskeletal system and connective tissue     History of arthritis    Personal history of other drug therapy 05/28/2020    History of pneumococcal vaccination    Personal history of other specified conditions 08/29/2019    History of fatigue   [2]   Past Surgical History:  Procedure Laterality Date    CARDIAC CATHETERIZATION N/A 9/6/2024    Procedure: Left Heart Cath;  Surgeon: Lexa Kent MD;  Location: [facility];  Service: Cardiovascular;  Laterality: N/A;    CARDIAC CATHETERIZATION N/A 9/6/2024    Procedure: PCI;  Surgeon: Lexa Kent MD;  Location: [facility];  Service: Cardiovascular;  Laterality: N/A;    CARDIAC DEFIBRILLATOR PLACEMENT      HERNIA REPAIR      OTHER SURGICAL HISTORY  10/19/2021    Prostate biopsy    OTHER SURGICAL HISTORY  06/15/2021    Hernia repair    OTHER SURGICAL HISTORY  06/15/2021    Colonoscopy    VASECTOMY     [3]   Family History  Problem Relation Name Age of Onset    Diabetes type II Mother      Hypertension Mother      Diabetes Mother      Other (CEREBRALVASCULAR ACCIDENT) Mother      Hypertension Father      Other (URINARY BLADDER CA) Father     [4]   Social History  Tobacco Use   Smoking Status Never    Passive exposure: Never   Smokeless Tobacco Never   [5]   Current Outpatient Medications   Medication Sig Dispense Refill    acetaminophen (Tylenol) 325 mg tablet Take 500 mg by mouth every 6  hours if needed for mild pain (1 - 3).      apixaban (Eliquis) 5 mg tablet Take 1 tablet (5 mg) by mouth 2 times a day. 180 tablet 3    atorvastatin (Lipitor) 40 mg tablet Take 1 tablet (40 mg) by mouth once daily at bedtime. 90 tablet 3    clopidogrel (Plavix) 75 mg tablet Take 1 tablet (75 mg) by mouth once daily. 90 tablet 3    colchicine 0.6 mg tablet Take 2 tablets initially and then 1 tablet one hour later. Follow by 1 tablet twice daily for 2 weeks. 31 tablet 1    Jardiance 10 mg TAKE 1 TABLET BY MOUTH EVERY DAY 90 tablet 3    methocarbamol (Robaxin) 500 mg tablet Take 1 tablet (500 mg) by mouth 3 times a day for 7 days. 21 tablet 0    metoprolol succinate XL (Toprol-XL) 25 mg 24 hr tablet TAKE 1 TABLET (25 MG) BY MOUTH DAILY DO NOT CRUSH OR CHEW 90 tablet 3    nitroglycerin (Nitrostat) 0.4 mg SL tablet Place 1 tablet (0.4 mg) under the tongue every 5 minutes if needed for chest pain. 25 tablet 3    sacubitriL-valsartan (Entresto) 49-51 mg tablet Take 1 tablet by mouth 2 times a day. 180 tablet 3    spironolactone (Aldactone) 25 mg tablet TAKE 1/2 TABLET BY MOUTH EVERY OTHER DAY. 23 tablet 3    tamsulosin (Flomax) 0.4 mg 24 hr capsule Take 1 capsule (0.4 mg) by mouth 2 times a day. 180 capsule 3     No current facility-administered medications for this visit.   [6] No Known Allergies

## 2025-08-11 ENCOUNTER — APPOINTMENT (OUTPATIENT)
Dept: RADIOLOGY | Facility: HOSPITAL | Age: 70
End: 2025-08-11
Payer: MEDICARE

## 2025-08-11 ENCOUNTER — HOSPITAL ENCOUNTER (EMERGENCY)
Facility: HOSPITAL | Age: 70
Discharge: HOME | End: 2025-08-11
Attending: EMERGENCY MEDICINE
Payer: MEDICARE

## 2025-08-11 VITALS
DIASTOLIC BLOOD PRESSURE: 72 MMHG | HEIGHT: 75 IN | RESPIRATION RATE: 18 BRPM | SYSTOLIC BLOOD PRESSURE: 138 MMHG | TEMPERATURE: 98.4 F | BODY MASS INDEX: 30.21 KG/M2 | OXYGEN SATURATION: 96 % | WEIGHT: 243 LBS | HEART RATE: 76 BPM

## 2025-08-11 DIAGNOSIS — M54.2 NECK PAIN: Primary | ICD-10-CM

## 2025-08-11 LAB
ALBUMIN SERPL BCP-MCNC: 3.8 G/DL (ref 3.4–5)
ALP SERPL-CCNC: 68 U/L (ref 33–136)
ALT SERPL W P-5'-P-CCNC: 9 U/L (ref 10–52)
ANION GAP SERPL CALC-SCNC: 10 MMOL/L (ref 10–20)
AST SERPL W P-5'-P-CCNC: 11 U/L (ref 9–39)
BASOPHILS # BLD AUTO: 0.03 X10*3/UL (ref 0–0.1)
BASOPHILS NFR BLD AUTO: 0.5 %
BILIRUB SERPL-MCNC: 0.6 MG/DL (ref 0–1.2)
BUN SERPL-MCNC: 19 MG/DL (ref 6–23)
CALCIUM SERPL-MCNC: 9.1 MG/DL (ref 8.6–10.3)
CHLORIDE SERPL-SCNC: 109 MMOL/L (ref 98–107)
CO2 SERPL-SCNC: 28 MMOL/L (ref 21–32)
CREAT SERPL-MCNC: 1.07 MG/DL (ref 0.5–1.3)
CRP SERPL-MCNC: 2.65 MG/DL
EGFRCR SERPLBLD CKD-EPI 2021: 74 ML/MIN/1.73M*2
EOSINOPHIL # BLD AUTO: 0.04 X10*3/UL (ref 0–0.4)
EOSINOPHIL NFR BLD AUTO: 0.6 %
ERYTHROCYTE [DISTWIDTH] IN BLOOD BY AUTOMATED COUNT: 15.1 % (ref 11.5–14.5)
GLUCOSE SERPL-MCNC: 118 MG/DL (ref 74–99)
HCT VFR BLD AUTO: 44.5 % (ref 41–52)
HGB BLD-MCNC: 14.2 G/DL (ref 13.5–17.5)
IMM GRANULOCYTES # BLD AUTO: 0.03 X10*3/UL (ref 0–0.5)
IMM GRANULOCYTES NFR BLD AUTO: 0.5 % (ref 0–0.9)
LYMPHOCYTES # BLD AUTO: 0.96 X10*3/UL (ref 0.8–3)
LYMPHOCYTES NFR BLD AUTO: 14.9 %
MCH RBC QN AUTO: 30.7 PG (ref 26–34)
MCHC RBC AUTO-ENTMCNC: 31.9 G/DL (ref 32–36)
MCV RBC AUTO: 96 FL (ref 80–100)
MONOCYTES # BLD AUTO: 1.01 X10*3/UL (ref 0.05–0.8)
MONOCYTES NFR BLD AUTO: 15.7 %
NEUTROPHILS # BLD AUTO: 4.37 X10*3/UL (ref 1.6–5.5)
NEUTROPHILS NFR BLD AUTO: 67.8 %
NRBC BLD-RTO: 0 /100 WBCS (ref 0–0)
PLATELET # BLD AUTO: 201 X10*3/UL (ref 150–450)
POTASSIUM SERPL-SCNC: 4.5 MMOL/L (ref 3.5–5.3)
PROT SERPL-MCNC: 7 G/DL (ref 6.4–8.2)
RBC # BLD AUTO: 4.63 X10*6/UL (ref 4.5–5.9)
SODIUM SERPL-SCNC: 142 MMOL/L (ref 136–145)
WBC # BLD AUTO: 6.4 X10*3/UL (ref 4.4–11.3)

## 2025-08-11 PROCEDURE — 85025 COMPLETE CBC W/AUTO DIFF WBC: CPT

## 2025-08-11 PROCEDURE — 72125 CT NECK SPINE W/O DYE: CPT | Performed by: RADIOLOGY

## 2025-08-11 PROCEDURE — 2500000005 HC RX 250 GENERAL PHARMACY W/O HCPCS

## 2025-08-11 PROCEDURE — 2500000004 HC RX 250 GENERAL PHARMACY W/ HCPCS (ALT 636 FOR OP/ED)

## 2025-08-11 PROCEDURE — 2500000001 HC RX 250 WO HCPCS SELF ADMINISTERED DRUGS (ALT 637 FOR MEDICARE OP)

## 2025-08-11 PROCEDURE — 84075 ASSAY ALKALINE PHOSPHATASE: CPT

## 2025-08-11 PROCEDURE — 36415 COLL VENOUS BLD VENIPUNCTURE: CPT

## 2025-08-11 PROCEDURE — 86140 C-REACTIVE PROTEIN: CPT

## 2025-08-11 PROCEDURE — 99284 EMERGENCY DEPT VISIT MOD MDM: CPT | Mod: 25 | Performed by: EMERGENCY MEDICINE

## 2025-08-11 PROCEDURE — 72125 CT NECK SPINE W/O DYE: CPT

## 2025-08-11 RX ORDER — CYCLOBENZAPRINE HCL 10 MG
5 TABLET ORAL ONCE
Status: COMPLETED | OUTPATIENT
Start: 2025-08-11 | End: 2025-08-11

## 2025-08-11 RX ORDER — ACETAMINOPHEN 325 MG/1
650 TABLET ORAL ONCE
Status: COMPLETED | OUTPATIENT
Start: 2025-08-11 | End: 2025-08-11

## 2025-08-11 RX ORDER — METHYLPREDNISOLONE 4 MG/1
TABLET ORAL
Qty: 21 TABLET | Refills: 0 | Status: SHIPPED | OUTPATIENT
Start: 2025-08-11

## 2025-08-11 RX ORDER — PREDNISONE 20 MG/1
60 TABLET ORAL ONCE
Status: COMPLETED | OUTPATIENT
Start: 2025-08-11 | End: 2025-08-11

## 2025-08-11 RX ORDER — LIDOCAINE 50 MG/G
1 PATCH TOPICAL DAILY
Qty: 6 PATCH | Refills: 0 | Status: SHIPPED | OUTPATIENT
Start: 2025-08-11 | End: 2025-08-14

## 2025-08-11 RX ORDER — CYCLOBENZAPRINE HCL 5 MG
5 TABLET ORAL 3 TIMES DAILY PRN
Qty: 15 TABLET | Refills: 0 | Status: SHIPPED | OUTPATIENT
Start: 2025-08-11 | End: 2025-08-16

## 2025-08-11 RX ORDER — LIDOCAINE 560 MG/1
2 PATCH PERCUTANEOUS; TOPICAL; TRANSDERMAL ONCE
Status: DISCONTINUED | OUTPATIENT
Start: 2025-08-11 | End: 2025-08-11 | Stop reason: HOSPADM

## 2025-08-11 RX ADMIN — ACETAMINOPHEN 650 MG: 325 TABLET ORAL at 10:55

## 2025-08-11 RX ADMIN — PREDNISONE 60 MG: 20 TABLET ORAL at 12:47

## 2025-08-11 RX ADMIN — LIDOCAINE 2 PATCH: 4 PATCH TOPICAL at 10:53

## 2025-08-11 RX ADMIN — CYCLOBENZAPRINE HYDROCHLORIDE 5 MG: 10 TABLET, FILM COATED ORAL at 10:55

## 2025-08-11 ASSESSMENT — PAIN DESCRIPTION - DESCRIPTORS
DESCRIPTORS: ACHING
DESCRIPTORS: TIGHTNESS
DESCRIPTORS: DISCOMFORT

## 2025-08-11 ASSESSMENT — PAIN DESCRIPTION - LOCATION
LOCATION: NECK

## 2025-08-11 ASSESSMENT — PAIN DESCRIPTION - PROGRESSION: CLINICAL_PROGRESSION: NOT CHANGED

## 2025-08-11 ASSESSMENT — PAIN - FUNCTIONAL ASSESSMENT
PAIN_FUNCTIONAL_ASSESSMENT: 0-10

## 2025-08-11 ASSESSMENT — PAIN DESCRIPTION - FREQUENCY: FREQUENCY: CONSTANT/CONTINUOUS

## 2025-08-11 ASSESSMENT — PAIN SCALES - GENERAL
PAINLEVEL_OUTOF10: 7
PAINLEVEL_OUTOF10: 4
PAINLEVEL_OUTOF10: 3

## 2025-08-11 ASSESSMENT — PAIN DESCRIPTION - ORIENTATION
ORIENTATION: LOWER
ORIENTATION: UPPER

## 2025-08-11 ASSESSMENT — PAIN DESCRIPTION - PAIN TYPE
TYPE: ACUTE PAIN
TYPE: ACUTE PAIN

## 2025-08-11 ASSESSMENT — PAIN DESCRIPTION - ONSET: ONSET: ONGOING

## 2025-08-12 LAB — HOLD SPECIMEN: NORMAL

## 2025-08-14 ENCOUNTER — HOSPITAL ENCOUNTER (OUTPATIENT)
Dept: RADIOLOGY | Facility: EXTERNAL LOCATION | Age: 70
Discharge: HOME | End: 2025-08-14

## 2025-08-14 ENCOUNTER — OFFICE VISIT (OUTPATIENT)
Dept: ORTHOPEDIC SURGERY | Facility: CLINIC | Age: 70
End: 2025-08-14
Payer: MEDICARE

## 2025-08-14 ENCOUNTER — APPOINTMENT (OUTPATIENT)
Dept: UROLOGY | Facility: CLINIC | Age: 70
End: 2025-08-14
Payer: MEDICARE

## 2025-08-14 DIAGNOSIS — M17.12 LOCALIZED OSTEOARTHRITIS OF LEFT KNEE: ICD-10-CM

## 2025-08-21 ENCOUNTER — OFFICE VISIT (OUTPATIENT)
Dept: VASCULAR SURGERY | Facility: CLINIC | Age: 70
End: 2025-08-21
Payer: MEDICARE

## 2025-08-21 ENCOUNTER — APPOINTMENT (OUTPATIENT)
Dept: ORTHOPEDIC SURGERY | Facility: CLINIC | Age: 70
End: 2025-08-21
Payer: MEDICARE

## 2025-08-21 ENCOUNTER — TELEPHONE (OUTPATIENT)
Dept: VASCULAR SURGERY | Facility: HOSPITAL | Age: 70
End: 2025-08-21

## 2025-08-21 VITALS
HEIGHT: 75 IN | RESPIRATION RATE: 16 BRPM | BODY MASS INDEX: 30.84 KG/M2 | WEIGHT: 248 LBS | DIASTOLIC BLOOD PRESSURE: 66 MMHG | HEART RATE: 95 BPM | TEMPERATURE: 97.8 F | SYSTOLIC BLOOD PRESSURE: 120 MMHG

## 2025-08-21 DIAGNOSIS — I65.22 STENOSIS OF LEFT CAROTID ARTERY: Primary | ICD-10-CM

## 2025-08-21 DIAGNOSIS — I65.23 CAROTID STENOSIS, ASYMPTOMATIC, BILATERAL: ICD-10-CM

## 2025-08-21 PROCEDURE — 99215 OFFICE O/P EST HI 40 MIN: CPT | Performed by: SURGERY

## 2025-08-21 PROCEDURE — 1036F TOBACCO NON-USER: CPT | Performed by: SURGERY

## 2025-08-21 PROCEDURE — 3008F BODY MASS INDEX DOCD: CPT | Performed by: SURGERY

## 2025-08-21 PROCEDURE — 1159F MED LIST DOCD IN RCRD: CPT | Performed by: SURGERY

## 2025-08-21 PROCEDURE — 99205 OFFICE O/P NEW HI 60 MIN: CPT | Performed by: SURGERY

## 2025-08-21 RX ORDER — CEFAZOLIN SODIUM 2 G/100ML
2 INJECTION, SOLUTION INTRAVENOUS ONCE
OUTPATIENT
Start: 2025-08-21 | End: 2025-08-21

## 2025-08-28 ENCOUNTER — APPOINTMENT (OUTPATIENT)
Dept: ORTHOPEDIC SURGERY | Facility: CLINIC | Age: 70
End: 2025-08-28
Payer: MEDICARE

## 2025-10-20 ENCOUNTER — APPOINTMENT (OUTPATIENT)
Dept: CARDIOLOGY | Facility: CLINIC | Age: 70
End: 2025-10-20
Payer: MEDICARE

## 2025-12-03 ENCOUNTER — APPOINTMENT (OUTPATIENT)
Dept: PRIMARY CARE | Facility: CLINIC | Age: 70
End: 2025-12-03
Payer: MEDICARE

## 2025-12-09 ENCOUNTER — APPOINTMENT (OUTPATIENT)
Dept: CARDIOLOGY | Facility: CLINIC | Age: 70
End: 2025-12-09
Payer: MEDICARE

## 2026-08-12 ENCOUNTER — APPOINTMENT (OUTPATIENT)
Dept: UROLOGY | Facility: CLINIC | Age: 71
End: 2026-08-12
Payer: MEDICARE

## (undated) DEVICE — GUIDEWIRE, TIGERWIRE, FLOPPY, ANGLED, 150CM

## (undated) DEVICE — SHEATH, GLIDESHEATH, SLENDER, 6FR 10CM

## (undated) DEVICE — TUBING, MANIFOLD, LOW PRESSURE

## (undated) DEVICE — INFLATION DEVICE, COPILOT VALVE AND 20/30 INDEFLATOR

## (undated) DEVICE — SYRINGE, ANGIOGRAPHIC, MULTIDOSE

## (undated) DEVICE — CATHETER, GUIDING, VISTA BRITE 6FR, XBRCA 100CM

## (undated) DEVICE — CATHETER, BALLOON, NC EUPHORA NONCOMPLIANT 3.25 X 15 X 142CM

## (undated) DEVICE — CATHETER, BALLOON DILATION, EUPHORA SEMICOMPLIANT 2.50  X 15 MM X 142CM

## (undated) DEVICE — GUIDEWIRE, RUN THROUGH WIRE, 180CM

## (undated) DEVICE — SHIELD, RADPAD, ELECTROPHYSIOLOGY, ORANGE, ABSORBENT

## (undated) DEVICE — BAND, D-STAT RADIAL, TOPICAL HEMOSTAT

## (undated) DEVICE — CATHETER, OPTITORQUE, 5FR, JACKY, 3.5/ 2H/110CM, CURVED